# Patient Record
Sex: FEMALE | Race: WHITE | Employment: OTHER | URBAN - METROPOLITAN AREA
[De-identification: names, ages, dates, MRNs, and addresses within clinical notes are randomized per-mention and may not be internally consistent; named-entity substitution may affect disease eponyms.]

---

## 2017-01-13 ENCOUNTER — HOSPITAL ENCOUNTER (OUTPATIENT)
Age: 82
Discharge: HOME | End: 2017-01-13
Attending: EMERGENCY MEDICINE
Payer: MEDICARE

## 2017-01-13 VITALS
OXYGEN SATURATION: 98 % | HEART RATE: 93 BPM | DIASTOLIC BLOOD PRESSURE: 84 MMHG | WEIGHT: 125 LBS | RESPIRATION RATE: 18 BRPM | TEMPERATURE: 97 F | HEIGHT: 62 IN | BODY MASS INDEX: 23 KG/M2 | SYSTOLIC BLOOD PRESSURE: 177 MMHG

## 2017-01-13 DIAGNOSIS — J40 BRONCHITIS: Primary | ICD-10-CM

## 2017-01-13 PROCEDURE — 99213 OFFICE O/P EST LOW 20 MIN: CPT

## 2017-01-13 PROCEDURE — 99214 OFFICE O/P EST MOD 30 MIN: CPT

## 2017-01-13 RX ORDER — AZITHROMYCIN 250 MG/1
TABLET, FILM COATED ORAL
Qty: 1 PACKAGE | Refills: 0 | Status: SHIPPED | OUTPATIENT
Start: 2017-01-13 | End: 2017-04-19 | Stop reason: CLARIF

## 2017-01-13 NOTE — ED PROVIDER NOTES
Patient presents with:  Cough/URI      HPI:     Macy Calloway is a 80year old female who presents for evaluation of a chief complaint of cough and hoarse voice. Onset of symptoms was over 2 weeks ago.  Care prior to arrival consisted of: Over-the-counte visit (from the past 10 hour(s)). Diagnosis:    ICD-10-CM    1. Bronchitis J40        All results reviewed and discussed with patient. See AVS for detailed discharge instructions for your condition today. Follow Up with:   Brenda Trammell

## 2017-02-28 ENCOUNTER — LAB ENCOUNTER (OUTPATIENT)
Dept: LAB | Age: 82
End: 2017-02-28
Attending: INTERNAL MEDICINE
Payer: MEDICARE

## 2017-02-28 ENCOUNTER — HOSPITAL ENCOUNTER (OUTPATIENT)
Dept: MAMMOGRAPHY | Age: 82
Discharge: HOME | End: 2017-02-28
Attending: INTERNAL MEDICINE
Payer: MEDICARE

## 2017-02-28 DIAGNOSIS — Z12.31 ENCOUNTER FOR MAMMOGRAM TO ESTABLISH BASELINE MAMMOGRAM: ICD-10-CM

## 2017-02-28 DIAGNOSIS — Z12.31 ENCOUNTER FOR SCREENING MAMMOGRAM FOR MALIGNANT NEOPLASM OF BREAST: ICD-10-CM

## 2017-02-28 DIAGNOSIS — R53.83 FATIGUE: ICD-10-CM

## 2017-02-28 DIAGNOSIS — I10 ESSENTIAL HYPERTENSION, MALIGNANT: ICD-10-CM

## 2017-02-28 DIAGNOSIS — E55.0 RICKETS, ACTIVE: Primary | ICD-10-CM

## 2017-02-28 LAB
ALBUMIN SERPL BCP-MCNC: 4.1 G/DL (ref 3.5–4.8)
ALBUMIN SERPL BCP-MCNC: 4.1 G/DL (ref 3.5–4.8)
ALP SERPL-CCNC: 81 U/L (ref 32–100)
ALT SERPL-CCNC: 18 U/L (ref 14–54)
ANION GAP SERPL CALC-SCNC: 11 MMOL/L (ref 0–18)
AST SERPL-CCNC: 31 U/L (ref 15–41)
BASOPHILS # BLD: 0 K/UL (ref 0–0.2)
BASOPHILS NFR BLD: 1 %
BILIRUB DIRECT SERPL-MCNC: 0.2 MG/DL (ref 0–0.2)
BILIRUB SERPL-MCNC: 0.8 MG/DL (ref 0.3–1.2)
BUN SERPL-MCNC: 14 MG/DL (ref 8–20)
BUN/CREAT SERPL: 12.3 (ref 10–20)
CALCIUM SERPL-MCNC: 9.6 MG/DL (ref 8.5–10.5)
CHLORIDE SERPL-SCNC: 98 MMOL/L (ref 95–110)
CHOLEST SERPL-MCNC: 255 MG/DL (ref 110–200)
CO2 SERPL-SCNC: 28 MMOL/L (ref 22–32)
CREAT SERPL-MCNC: 1.14 MG/DL (ref 0.5–1.5)
EOSINOPHIL # BLD: 0.2 K/UL (ref 0–0.7)
EOSINOPHIL NFR BLD: 4 %
ERYTHROCYTE [DISTWIDTH] IN BLOOD BY AUTOMATED COUNT: 14.3 % (ref 11–15)
GLUCOSE SERPL-MCNC: 83 MG/DL (ref 70–99)
HCT VFR BLD AUTO: 38.7 % (ref 35–48)
HDLC SERPL-MCNC: 98 MG/DL
HGB BLD-MCNC: 12.8 G/DL (ref 12–16)
LDLC SERPL CALC-MCNC: 142 MG/DL (ref 0–99)
LYMPHOCYTES # BLD: 1.3 K/UL (ref 1–4)
LYMPHOCYTES NFR BLD: 31 %
MCH RBC QN AUTO: 29.6 PG (ref 27–32)
MCHC RBC AUTO-ENTMCNC: 33.2 G/DL (ref 32–37)
MCV RBC AUTO: 89.4 FL (ref 80–100)
MONOCYTES # BLD: 0.5 K/UL (ref 0–1)
MONOCYTES NFR BLD: 13 %
NEUTROPHILS # BLD AUTO: 2.2 K/UL (ref 1.8–7.7)
NEUTROPHILS NFR BLD: 52 %
NONHDLC SERPL-MCNC: 157 MG/DL
OSMOLALITY UR CALC.SUM OF ELEC: 284 MOSM/KG (ref 275–295)
PHOSPHATE SERPL-MCNC: 3.4 MG/DL (ref 2.4–4.7)
PLATELET # BLD AUTO: 241 K/UL (ref 140–400)
PMV BLD AUTO: 8.8 FL (ref 7.4–10.3)
POTASSIUM SERPL-SCNC: 3.5 MMOL/L (ref 3.3–5.1)
PROT SERPL-MCNC: 7.3 G/DL (ref 5.9–8.4)
RBC # BLD AUTO: 4.33 M/UL (ref 3.7–5.4)
SODIUM SERPL-SCNC: 137 MMOL/L (ref 136–144)
TRIGL SERPL-MCNC: 74 MG/DL (ref 1–149)
TSH SERPL-ACNC: 3.86 UIU/ML (ref 0.34–5.6)
WBC # BLD AUTO: 4.2 K/UL (ref 4–11)

## 2017-02-28 PROCEDURE — 77067 SCR MAMMO BI INCL CAD: CPT

## 2017-02-28 PROCEDURE — 84443 ASSAY THYROID STIM HORMONE: CPT

## 2017-02-28 PROCEDURE — 80076 HEPATIC FUNCTION PANEL: CPT

## 2017-02-28 PROCEDURE — 36415 COLL VENOUS BLD VENIPUNCTURE: CPT

## 2017-02-28 PROCEDURE — 80061 LIPID PANEL: CPT

## 2017-02-28 PROCEDURE — 80069 RENAL FUNCTION PANEL: CPT

## 2017-02-28 PROCEDURE — 85025 COMPLETE CBC W/AUTO DIFF WBC: CPT

## 2017-02-28 PROCEDURE — 82306 VITAMIN D 25 HYDROXY: CPT

## 2017-03-01 LAB — 25(OH)D3 SERPL-MCNC: 41.3 NG/ML

## 2017-03-23 PROBLEM — F41.9 ANXIETY: Status: ACTIVE | Noted: 2017-03-23

## 2017-04-19 ENCOUNTER — LAB ENCOUNTER (OUTPATIENT)
Dept: LAB | Age: 82
End: 2017-04-19
Attending: INTERNAL MEDICINE
Payer: MEDICARE

## 2017-04-19 ENCOUNTER — HOSPITAL ENCOUNTER (OUTPATIENT)
Dept: GENERAL RADIOLOGY | Age: 82
Discharge: HOME | End: 2017-04-19
Attending: INTERNAL MEDICINE
Payer: MEDICARE

## 2017-04-19 DIAGNOSIS — R05.9 COUGH: ICD-10-CM

## 2017-04-19 DIAGNOSIS — R11.14 BILIOUS VOMITING WITH NAUSEA: ICD-10-CM

## 2017-04-19 DIAGNOSIS — R10.13 EPIGASTRIC PAIN: ICD-10-CM

## 2017-04-19 PROCEDURE — 85025 COMPLETE CBC W/AUTO DIFF WBC: CPT

## 2017-04-19 PROCEDURE — 80053 COMPREHEN METABOLIC PANEL: CPT

## 2017-04-19 PROCEDURE — 36415 COLL VENOUS BLD VENIPUNCTURE: CPT

## 2017-04-19 PROCEDURE — 83690 ASSAY OF LIPASE: CPT

## 2017-04-19 PROCEDURE — 71020 XR CHEST PA + LAT CHEST (CPT=71020): CPT

## 2017-04-21 NOTE — PROGRESS NOTES
Discussed findings with patient. Discussed slightly low potassium level. Recommend increase fluid intake and foods including fruits. Patient will be eating more bananas. She is not coughing any further, therefore will not prescribe antibiotics.   Discus

## 2017-09-26 ENCOUNTER — APPOINTMENT (OUTPATIENT)
Dept: GENERAL RADIOLOGY | Facility: HOSPITAL | Age: 82
End: 2017-09-26
Attending: EMERGENCY MEDICINE
Payer: MEDICARE

## 2017-09-26 ENCOUNTER — HOSPITAL ENCOUNTER (OUTPATIENT)
Age: 82
Discharge: ANOTHER HOSPITAL | End: 2017-09-26
Attending: EMERGENCY MEDICINE
Payer: MEDICARE

## 2017-09-26 ENCOUNTER — HOSPITAL ENCOUNTER (OUTPATIENT)
Facility: HOSPITAL | Age: 82
Setting detail: OBSERVATION
LOS: 1 days | Discharge: HOME | End: 2017-09-28
Attending: EMERGENCY MEDICINE | Admitting: INTERNAL MEDICINE
Payer: MEDICARE

## 2017-09-26 DIAGNOSIS — I35.0 AORTIC VALVE STENOSIS, ETIOLOGY OF CARDIAC VALVE DISEASE UNSPECIFIED: ICD-10-CM

## 2017-09-26 DIAGNOSIS — R07.9 EXERTIONAL CHEST PAIN: Primary | ICD-10-CM

## 2017-09-26 DIAGNOSIS — R07.9 ACUTE CHEST PAIN: Primary | ICD-10-CM

## 2017-09-26 PROCEDURE — 99285 EMERGENCY DEPT VISIT HI MDM: CPT

## 2017-09-26 PROCEDURE — 85025 COMPLETE CBC W/AUTO DIFF WBC: CPT | Performed by: EMERGENCY MEDICINE

## 2017-09-26 PROCEDURE — 93010 ELECTROCARDIOGRAM REPORT: CPT | Performed by: EMERGENCY MEDICINE

## 2017-09-26 PROCEDURE — 84484 ASSAY OF TROPONIN QUANT: CPT | Performed by: EMERGENCY MEDICINE

## 2017-09-26 PROCEDURE — 93005 ELECTROCARDIOGRAM TRACING: CPT

## 2017-09-26 PROCEDURE — 93010 ELECTROCARDIOGRAM REPORT: CPT

## 2017-09-26 PROCEDURE — 36000 PLACE NEEDLE IN VEIN: CPT

## 2017-09-26 PROCEDURE — 36415 COLL VENOUS BLD VENIPUNCTURE: CPT

## 2017-09-26 PROCEDURE — 99214 OFFICE O/P EST MOD 30 MIN: CPT

## 2017-09-26 PROCEDURE — 85379 FIBRIN DEGRADATION QUANT: CPT | Performed by: EMERGENCY MEDICINE

## 2017-09-26 PROCEDURE — 71020 XR CHEST PA + LAT CHEST (CPT=71020): CPT | Performed by: EMERGENCY MEDICINE

## 2017-09-26 PROCEDURE — 80048 BASIC METABOLIC PNL TOTAL CA: CPT | Performed by: EMERGENCY MEDICINE

## 2017-09-26 RX ORDER — TRIAMTERENE AND HYDROCHLOROTHIAZIDE 37.5; 25 MG/1; MG/1
1 CAPSULE ORAL DAILY
Status: DISCONTINUED | OUTPATIENT
Start: 2017-09-27 | End: 2017-09-28

## 2017-09-26 RX ORDER — SERTRALINE HYDROCHLORIDE 100 MG/1
100 TABLET, FILM COATED ORAL DAILY
Status: DISCONTINUED | OUTPATIENT
Start: 2017-09-27 | End: 2017-09-27

## 2017-09-26 RX ORDER — ASPIRIN 81 MG/1
324 TABLET, CHEWABLE ORAL ONCE
Status: COMPLETED | OUTPATIENT
Start: 2017-09-26 | End: 2017-09-26

## 2017-09-26 RX ORDER — ACETAMINOPHEN 325 MG/1
650 TABLET ORAL EVERY 6 HOURS PRN
Status: DISCONTINUED | OUTPATIENT
Start: 2017-09-26 | End: 2017-09-28

## 2017-09-26 RX ORDER — ASPIRIN 81 MG/1
81 TABLET ORAL DAILY
Status: DISCONTINUED | OUTPATIENT
Start: 2017-09-27 | End: 2017-09-28

## 2017-09-26 RX ORDER — PANTOPRAZOLE SODIUM 40 MG/1
40 TABLET, DELAYED RELEASE ORAL
Status: DISCONTINUED | OUTPATIENT
Start: 2017-09-27 | End: 2017-09-28

## 2017-09-26 RX ORDER — OMEGA-3-ACID ETHYL ESTERS 1 G/1
1 CAPSULE, LIQUID FILLED ORAL DAILY
Status: ON HOLD | COMMUNITY
End: 2017-09-26 | Stop reason: CLARIF

## 2017-09-26 RX ORDER — NAPROXEN 375 MG/1
375 TABLET ORAL 2 TIMES DAILY PRN
Status: DISCONTINUED | OUTPATIENT
Start: 2017-09-26 | End: 2017-09-28

## 2017-09-26 RX ORDER — OYSTER SHELL CALCIUM WITH VITAMIN D 500; 200 MG/1; [IU]/1
1 TABLET, FILM COATED ORAL DAILY
Status: DISCONTINUED | OUTPATIENT
Start: 2017-09-27 | End: 2017-09-28

## 2017-09-26 RX ORDER — MULTIVITAMIN WITH IRON
1 TABLET ORAL DAILY
Status: DISCONTINUED | OUTPATIENT
Start: 2017-09-27 | End: 2017-09-28

## 2017-09-26 RX ORDER — OMEGA-3 FATTY ACIDS/FISH OIL 300-1000MG
1 CAPSULE ORAL
COMMUNITY

## 2017-09-26 RX ORDER — 0.9 % SODIUM CHLORIDE 0.9 %
1000 INTRAVENOUS SOLUTION INTRAVENOUS ONCE
Status: COMPLETED | OUTPATIENT
Start: 2017-09-26 | End: 2017-09-26

## 2017-09-26 NOTE — ED PROVIDER NOTES
[facility]    History   Patient presents with:  Chest Pain Angina (cardiovascular)    Stated Complaint: chest pain, dizzy    HPI    Patient is an 51-year-old female with a history of aortic stenosis and h complaint: chest pain, dizzy  Other systems are as noted in HPI. Constitutional and vital signs reviewed. All other systems reviewed and negative except as noted above.     PSFH elements reviewed from today and agreed except as otherwise stated in HPI

## 2017-09-26 NOTE — ED NOTES
Seen and examined by dr Teri Ordonez , pt to go to ed for further eval and management, 911 called, report called by kolby cagle, tl

## 2017-09-26 NOTE — ED PROVIDER NOTES
Patient Seen in: [facility] Emergency Department    History   Patient presents with:  Chest Pain Angina (cardiovascular)    Stated Complaint:  Chest pain    HPI    79 yo F with PMH HTN, moderate aortic stenosis (as per 9/2016 TTE) presenting for jcarlos daily   CALCIUM + D OR,  one tab daily       Family History   Problem Relation Age of Onset   • Cancer Mother      Breast Cancer 71   • Breast Cancer Mother 64   • Heart Disease Mother      aortic stenosis   • Cancer Maternal Grandmother      breast   • Br GFR, -American 56 (*)     All other components within normal limits   CBC W/ DIFFERENTIAL - Abnormal; Notable for the following:     HGB 10.7 (*)     HCT 31.8 (*)     RDW 15.1 (*)     All other components within normal limits   TROPONIN I, 0 HOUR CARDIAC/VASC: The cardiomediastinal silhouette is not enlarged. There is mild tortuosity of the thoracic aorta with peripheral atherosclerotic vascular calcification. The pulmonary vascularity is within normal limits.  MEDIAST/LAWRENCE: No visible mass or adeno with recs appreciated. ED course nonacute.       Disposition and Plan     Clinical Impression:  Exertional chest pain  (primary encounter diagnosis)  Aortic valve stenosis, etiology of cardiac valve disease unspecified    Disposition:  Admit    Follow-up:

## 2017-09-27 ENCOUNTER — APPOINTMENT (OUTPATIENT)
Dept: CV DIAGNOSTICS | Facility: HOSPITAL | Age: 82
End: 2017-09-27
Attending: INTERNAL MEDICINE
Payer: MEDICARE

## 2017-09-27 PROBLEM — N18.30 CHRONIC RENAL INSUFFICIENCY, STAGE 3 (MODERATE) (HCC): Status: ACTIVE | Noted: 2017-09-27

## 2017-09-27 PROBLEM — D50.8 IRON DEFICIENCY ANEMIA SECONDARY TO INADEQUATE DIETARY IRON INTAKE: Status: ACTIVE | Noted: 2017-09-27

## 2017-09-27 PROBLEM — F32.9 REACTIVE DEPRESSION: Status: ACTIVE | Noted: 2017-09-27

## 2017-09-27 PROCEDURE — 85025 COMPLETE CBC W/AUTO DIFF WBC: CPT | Performed by: INTERNAL MEDICINE

## 2017-09-27 PROCEDURE — 80076 HEPATIC FUNCTION PANEL: CPT | Performed by: INTERNAL MEDICINE

## 2017-09-27 PROCEDURE — 80061 LIPID PANEL: CPT | Performed by: INTERNAL MEDICINE

## 2017-09-27 PROCEDURE — 82728 ASSAY OF FERRITIN: CPT | Performed by: INTERNAL MEDICINE

## 2017-09-27 PROCEDURE — 80069 RENAL FUNCTION PANEL: CPT | Performed by: INTERNAL MEDICINE

## 2017-09-27 PROCEDURE — 82607 VITAMIN B-12: CPT | Performed by: INTERNAL MEDICINE

## 2017-09-27 PROCEDURE — 93306 TTE W/DOPPLER COMPLETE: CPT | Performed by: INTERNAL MEDICINE

## 2017-09-27 PROCEDURE — 84466 ASSAY OF TRANSFERRIN: CPT | Performed by: INTERNAL MEDICINE

## 2017-09-27 PROCEDURE — 83540 ASSAY OF IRON: CPT | Performed by: INTERNAL MEDICINE

## 2017-09-27 PROCEDURE — 85045 AUTOMATED RETICULOCYTE COUNT: CPT | Performed by: INTERNAL MEDICINE

## 2017-09-27 PROCEDURE — 84484 ASSAY OF TROPONIN QUANT: CPT | Performed by: INTERNAL MEDICINE

## 2017-09-27 PROCEDURE — 85652 RBC SED RATE AUTOMATED: CPT | Performed by: INTERNAL MEDICINE

## 2017-09-27 NOTE — PLAN OF CARE
PAIN - ADULT    • Verbalizes/displays adequate comfort level or patient's stated pain goal Progressing        SAFETY ADULT - FALL    • Free from fall injury Progressing        Patient admitted with exertional chest pain. Currently not experiencing any s/s.

## 2017-09-27 NOTE — CM/SW NOTE
CTL update from RN regarding progression of care. Status: OBS. Discharge is pending 2 D Echo results. If within normal limits, patient will have a Stress Test.  If Echo is positive, patient may have cardiac cath.       SW & CTL will continue to follow p

## 2017-09-27 NOTE — H&P
[address] [facility] Patient Status:  Inpatient    1930 MRN [id number]   Location [facility] 3W/SW Attending Brent Majano MD   Hosp Day # 1 PCP Carlie Egan MD     Date:  2017  Da complications which have arisen and more stress related to the above.     History     Past Medical History:   Diagnosis Date   • Aortic stenosis    • Arthritis    • Bronchitis    • Concussion    • Constipation    • Depression    • HIGH BLOOD PRESSURE    • I one tab daily   VITAMIN E one tab daily   VITAMIN-B COMPLEX OR one tab daily       Review of Systems:   Constitutional: Normally feels well with reasonably good energy  Respiratory: No fever chills cough shortness of breath or active wheezing  Cardiovascul (L) 09/27/2017   ALKPHO 60 09/27/2017   BILT 0.7 09/27/2017   TP 5.8 (L) 09/27/2017   AST 27 09/27/2017   ALT 15 09/27/2017   TSH 3.86 02/28/2017   LIP 41 04/19/2017   DDIMER 0.50 09/26/2017   ESRML 22 09/27/2017   PHOS 4.5 09/27/2017   TROP 0.01 09/27/201

## 2017-09-27 NOTE — CONSULTS
[facility]    PATIENT'S NAME: Kelli Borjas   ATTENDING PHYSICIAN: Marly Grant. Paresh Ross MD   CONSULTING PHYSICIAN: Osorio Yang.  Libra Lobo MD   PATIENT ACCOUNT#:   [id number]    LOCATION:  3WSW [address] [facility] #:   [id number]       DATE OF BIR 156/82, pulse is 68 and regular. HEENT:  Head is normocephalic. Eyes are nonicteric. Oral mucosa is moist.   NECK:  No JVD or carotid bruits. Thyroid is small. LUNGS:  No rales or wheezes. HEART:  Regular rate and rhythm.   Systolic ejection murm

## 2017-09-27 NOTE — PLAN OF CARE
Plan for 2D echo today. Patient is on schedule for testing but according to staff plan for echo to be done later in afternoon.      1600 -  Voicemail message left to determine if testing to be completed today or tomorrow

## 2017-09-28 ENCOUNTER — APPOINTMENT (OUTPATIENT)
Dept: NUCLEAR MEDICINE | Facility: HOSPITAL | Age: 82
End: 2017-09-28
Attending: INTERNAL MEDICINE
Payer: MEDICARE

## 2017-09-28 ENCOUNTER — APPOINTMENT (OUTPATIENT)
Dept: CV DIAGNOSTICS | Facility: HOSPITAL | Age: 82
End: 2017-09-28
Attending: INTERNAL MEDICINE
Payer: MEDICARE

## 2017-09-28 VITALS
RESPIRATION RATE: 16 BRPM | SYSTOLIC BLOOD PRESSURE: 141 MMHG | HEART RATE: 81 BPM | DIASTOLIC BLOOD PRESSURE: 76 MMHG | BODY MASS INDEX: 21.62 KG/M2 | OXYGEN SATURATION: 96 % | HEIGHT: 62.01 IN | TEMPERATURE: 98 F | WEIGHT: 119 LBS

## 2017-09-28 PROCEDURE — 80069 RENAL FUNCTION PANEL: CPT | Performed by: INTERNAL MEDICINE

## 2017-09-28 PROCEDURE — 93017 CV STRESS TEST TRACING ONLY: CPT | Performed by: INTERNAL MEDICINE

## 2017-09-28 PROCEDURE — 93018 CV STRESS TEST I&R ONLY: CPT | Performed by: INTERNAL MEDICINE

## 2017-09-28 PROCEDURE — 78452 HT MUSCLE IMAGE SPECT MULT: CPT | Performed by: INTERNAL MEDICINE

## 2017-09-28 PROCEDURE — 85025 COMPLETE CBC W/AUTO DIFF WBC: CPT | Performed by: INTERNAL MEDICINE

## 2017-09-28 PROCEDURE — 93016 CV STRESS TEST SUPVJ ONLY: CPT | Performed by: INTERNAL MEDICINE

## 2017-09-28 RX ORDER — 0.9 % SODIUM CHLORIDE 0.9 %
VIAL (ML) INJECTION
Status: COMPLETED
Start: 2017-09-28 | End: 2017-09-28

## 2017-09-28 NOTE — PROGRESS NOTES
[facility]    Progress Note    Farhat Bergman Patient Status:  Observation    1930 MRN [id number]   Location [facility] 3W/SW Attending Jesus Mays MD   Hosp Day # 1 PCP Myrna Hoff MD       Subjective:     Charisse Kirkpatrick intrathoracic process. 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  ABNORMAL When compared with ECG of 09/26/2017 16:59:16 No significant changes have occurred El

## 2017-09-28 NOTE — PLAN OF CARE
Problem: Patient/Family Goals  Goal: Patient/Family Long Term Goal  Patient's Long Term Goal: to be discharged home    Interventions:  - Patient sitting up in chair for meals and as tolerated.   - See additional Care Plan goals for specific interventions Identify cognitive and physical deficits and behaviors that affect risk of falls.   - Kinzers fall precautions as indicated by assessment.  - Educate pt/family on patient safety including physical limitations  - Instruct pt to call for assistance with act

## 2017-09-28 NOTE — PROGRESS NOTES
[facility]  Progress Note    Theodora Valentino Patient Status:  Observation    1930 MRN [id number]   Location [facility] 3W/SW Attending Vipin Stevenson MD   Hosp Day # 1 PCP Otoniel Ocampo MD       ASSESSMENT:    1 present  Neurologic: Alert and oriented, normal affect. Skin: Warm and dry.    Psych: Appropriate      Laboratory/Data:  Diagnostics:     Echo: Pending     TELE: NSR    Labs:    Lab Results  Component Value Date   WBC 3.8 09/28/2017   HGB 10.1 09/28/2017

## 2017-10-19 PROBLEM — R07.9 EXERTIONAL CHEST PAIN: Status: RESOLVED | Noted: 2017-09-26 | Resolved: 2017-10-19

## 2017-10-29 ENCOUNTER — HOSPITAL ENCOUNTER (OUTPATIENT)
Age: 82
Discharge: HOME | End: 2017-10-29
Attending: PEDIATRICS
Payer: MEDICARE

## 2017-10-29 VITALS
DIASTOLIC BLOOD PRESSURE: 70 MMHG | SYSTOLIC BLOOD PRESSURE: 112 MMHG | WEIGHT: 120 LBS | HEART RATE: 99 BPM | RESPIRATION RATE: 18 BRPM | HEIGHT: 62.5 IN | BODY MASS INDEX: 21.53 KG/M2 | TEMPERATURE: 97 F | OXYGEN SATURATION: 99 %

## 2017-10-29 DIAGNOSIS — G89.29 CHRONIC LEFT SHOULDER PAIN: Primary | ICD-10-CM

## 2017-10-29 DIAGNOSIS — M25.512 CHRONIC LEFT SHOULDER PAIN: Primary | ICD-10-CM

## 2017-10-29 PROCEDURE — 99214 OFFICE O/P EST MOD 30 MIN: CPT

## 2017-10-29 PROCEDURE — 99213 OFFICE O/P EST LOW 20 MIN: CPT

## 2017-10-29 RX ORDER — TRAMADOL HYDROCHLORIDE 50 MG/1
TABLET ORAL EVERY 4 HOURS PRN
Qty: 20 TABLET | Refills: 0 | Status: SHIPPED | OUTPATIENT
Start: 2017-10-29 | End: 2017-11-05

## 2017-10-29 NOTE — ED INITIAL ASSESSMENT (HPI)
PATIENT ARRIVED AMBULATORY TO ROOM C/O LEFT SHOULDER PAIN AND LEFT ELBOW PAIN.  PATIENT STATES \"I'M ALSO HAVING PAIN ON THE RIGHT ARM TOO\" PATIENT STATES \"MY DOCTOR REFERRED ME TO AN ORTHO BUT I CAN'T GET IN WITH HIM UNTIL 11/07 AND THE PAIN IS GETTING W

## 2017-10-29 NOTE — ED PROVIDER NOTES
Patient Seen in: [address]    History   Patient presents with:  Shoulder Pain    Stated Complaint: bursitis problems    HPI    HPI: Gudelia Plasencia is a 80year old female who presents with bilateral shoulder pain for 3 daily.   VITAMIN D OR,  one tab daily   VITAMIN E,  one tab daily   VITAMIN-B COMPLEX OR,  one tab daily       Family History   Problem Relation Age of Onset   • Cancer Mother      Breast Cancer 71   • Breast Cancer Mother 64   • Heart Disease Mother rotator cuff problems. I advised to keep her appointment with the orthopedic surgeon. Trial tramadol for pain. Side effects were discussed in detail.     Disposition:  Discharge    Follow-up:  Riccadro Phillip MD  [facility]

## 2017-10-30 ENCOUNTER — LAB ENCOUNTER (OUTPATIENT)
Dept: LAB | Age: 82
End: 2017-10-30
Attending: INTERNAL MEDICINE
Payer: MEDICARE

## 2017-10-30 DIAGNOSIS — D50.8 IRON DEFICIENCY ANEMIA SECONDARY TO INADEQUATE DIETARY IRON INTAKE: ICD-10-CM

## 2017-10-30 PROCEDURE — 85025 COMPLETE CBC W/AUTO DIFF WBC: CPT

## 2017-10-30 PROCEDURE — 36415 COLL VENOUS BLD VENIPUNCTURE: CPT

## 2017-11-27 ENCOUNTER — CHARTING TRANS (OUTPATIENT)
Dept: OTHER | Age: 82
End: 2017-11-27

## 2018-01-13 NOTE — DISCHARGE SUMMARY
[facility]    Discharge Summary    Narendra Grewal Patient Status:  Observation    1930 MRN [id number]   Location [facility] 3W/SW Attending No att. providers found   Hosp Day # 1 PCP Elizabeth Edge MD     Date of Admission Historical    Omega 3 1000 MG Oral Cap  Take 1 capsule by mouth., Historical    Triamterene-HCTZ (DYAZIDE) 37.5-25 MG Oral Cap  one tab daily, Normal, Disp-90 capsule, R-1    Omeprazole 40 MG Oral Capsule Delayed Release  Take 1 capsule (40 mg total) by mo

## 2018-01-31 ENCOUNTER — HOSPITAL ENCOUNTER (OUTPATIENT)
Dept: GENERAL RADIOLOGY | Age: 83
Discharge: HOME | End: 2018-01-31
Attending: ORTHOPAEDIC SURGERY
Payer: MEDICARE

## 2018-01-31 DIAGNOSIS — M25.519 SHOULDER PAIN: ICD-10-CM

## 2018-01-31 PROCEDURE — 73030 X-RAY EXAM OF SHOULDER: CPT | Performed by: ORTHOPAEDIC SURGERY

## 2018-02-01 PROBLEM — I35.0 MODERATE AORTIC STENOSIS: Status: ACTIVE | Noted: 2017-09-26

## 2018-03-05 ENCOUNTER — OFFICE VISIT (OUTPATIENT)
Dept: PHYSICAL THERAPY | Age: 83
End: 2018-03-05
Attending: ORTHOPAEDIC SURGERY
Payer: MEDICARE

## 2018-03-05 PROCEDURE — 97163 PT EVAL HIGH COMPLEX 45 MIN: CPT

## 2018-03-05 NOTE — PROGRESS NOTES
UPPER EXTREMITY EVALUATION:   Referring Physician: Dr. Gisela Driver  Diagnosis: B shoulder pain  Date of Onset: >9months ago Date of Service: 3/5/2018     PATIENT SUMMARY   Ginny Lynch is a 80year old y/o female who presents to therapy today with complain female who presents to PT with c/o L shoulder pain that began of insidious onset 9 months ago and has progressed since. Postural deviations noted with moderate fwd head, rounded shoulders, increased thoracic kyphosis abducted scapulae.  Moderate postural co decreased into abduction  AC: decreased IR and ER motion L>R  GH: decreased into abd on R, dec into abd and flex on L    Special tests:   Neers: limited by joint restriction  Toña-Jesus Manuel: (+) on L  Speeds: (+) on R, pain-limiting on L    Palpation: TTP treatment options and has agreed to actively participate in planning and for this course of care. Thank you for your referral. Please co-sign or sign and return this letter via fax as soon as possible to [phone].  If you have any questions, please c

## 2018-03-08 ENCOUNTER — TELEPHONE (OUTPATIENT)
Dept: PHYSICAL THERAPY | Age: 83
End: 2018-03-08

## 2018-03-08 ENCOUNTER — APPOINTMENT (OUTPATIENT)
Dept: PHYSICAL THERAPY | Age: 83
End: 2018-03-08
Attending: ORTHOPAEDIC SURGERY
Payer: MEDICARE

## 2018-03-15 ENCOUNTER — TELEPHONE (OUTPATIENT)
Dept: PHYSICAL THERAPY | Age: 83
End: 2018-03-15

## 2018-03-16 ENCOUNTER — OFFICE VISIT (OUTPATIENT)
Dept: PHYSICAL THERAPY | Age: 83
End: 2018-03-16
Attending: ORTHOPAEDIC SURGERY
Payer: MEDICARE

## 2018-03-16 PROCEDURE — 97112 NEUROMUSCULAR REEDUCATION: CPT

## 2018-03-16 PROCEDURE — 97140 MANUAL THERAPY 1/> REGIONS: CPT

## 2018-03-16 PROCEDURE — 97110 THERAPEUTIC EXERCISES: CPT

## 2018-03-16 NOTE — PROGRESS NOTES
B shoulder pain  Authorized # of Visits:  10-12  (per POC)        Next MD visit: none scheduled  Fall Risk: standard         Precautions: n/a           Medication Changes since last visit?: No  Subjective: Patient states her shoulders have been doing bett Treatment: 45 min  Total Treatment Time: 45 min

## 2018-03-19 ENCOUNTER — OFFICE VISIT (OUTPATIENT)
Dept: PHYSICAL THERAPY | Age: 83
End: 2018-03-19
Attending: ORTHOPAEDIC SURGERY
Payer: MEDICARE

## 2018-03-19 ENCOUNTER — TELEPHONE (OUTPATIENT)
Dept: PHYSICAL THERAPY | Age: 83
End: 2018-03-19

## 2018-03-19 PROCEDURE — 97112 NEUROMUSCULAR REEDUCATION: CPT

## 2018-03-19 PROCEDURE — 97110 THERAPEUTIC EXERCISES: CPT

## 2018-03-19 NOTE — PROGRESS NOTES
B shoulder pain  Authorized # of Visits:  10-12  (per POC)        Next MD visit: none scheduled  Fall Risk: standard         Precautions: n/a           Medication Changes since last visit?: No  Subjective: Patient states was reaching up and felt pain in h postural education and progressed to standing posture. Pt requires moderate cueing for postural correction, however able to improve shoulder elevation when in corrected position. Patient Education: Instructed pt on HEP progression indicated above.  Revie

## 2018-03-21 ENCOUNTER — APPOINTMENT (OUTPATIENT)
Dept: PHYSICAL THERAPY | Age: 83
End: 2018-03-21
Attending: ORTHOPAEDIC SURGERY
Payer: MEDICARE

## 2018-03-23 ENCOUNTER — OFFICE VISIT (OUTPATIENT)
Dept: PHYSICAL THERAPY | Age: 83
End: 2018-03-23
Attending: INTERNAL MEDICINE
Payer: MEDICARE

## 2018-03-23 PROCEDURE — 97140 MANUAL THERAPY 1/> REGIONS: CPT

## 2018-03-23 PROCEDURE — 97110 THERAPEUTIC EXERCISES: CPT

## 2018-03-23 NOTE — PROGRESS NOTES
B shoulder pain  Authorized # of Visits:  10-12  (per POC)        Next MD visit: none scheduled  Fall Risk: standard         Precautions: n/a           Medication Changes since last visit?: No  Subjective: Patient states still has a little bit of pain in head/neck and shoulder position    Modalities   MHP to B shoulders x5mins        Assessment: Continued with mobility exercises, decreasing repetitions due to aggravation of symptoms. Initiated UBE for added mobility work. Pt able to perform pain-free.  Darlingig

## 2018-03-26 ENCOUNTER — OFFICE VISIT (OUTPATIENT)
Dept: PHYSICAL THERAPY | Age: 83
End: 2018-03-26
Attending: ORTHOPAEDIC SURGERY
Payer: MEDICARE

## 2018-03-26 PROCEDURE — 97140 MANUAL THERAPY 1/> REGIONS: CPT

## 2018-03-26 PROCEDURE — 97110 THERAPEUTIC EXERCISES: CPT

## 2018-03-26 NOTE — PROGRESS NOTES
B shoulder pain  Authorized # of Visits:  10-12  (per POC)        Next MD visit: none scheduled  Fall Risk: standard         Precautions: n/a           Medication Changes since last visit?: No  Subjective: Patient states her shoulders have been doing bett Grade II/III GHJ mobs  - Gentle STM L anterior shoulder mms Supine:   - Grade II/III GHJ mobs  - Gentle STM L anterior and lateral shoulder mms Supine:  - Grade II GHJ mobs  - STM post RTC musculature focus L   Neuromuscular Re-education Seated postural co back  4. Pt will increase BUE strength to at least 4/5 take bowls out of overhead cabinets          Charges:  TherEx 2, Man x1     Total Timed Treatment: 47 min  Total Treatment Time: 58 min

## 2018-03-29 ENCOUNTER — APPOINTMENT (OUTPATIENT)
Dept: PHYSICAL THERAPY | Age: 83
End: 2018-03-29
Attending: ORTHOPAEDIC SURGERY
Payer: MEDICARE

## 2018-04-02 ENCOUNTER — TELEPHONE (OUTPATIENT)
Dept: PHYSICAL THERAPY | Age: 83
End: 2018-04-02

## 2018-04-03 ENCOUNTER — OFFICE VISIT (OUTPATIENT)
Dept: PHYSICAL THERAPY | Age: 83
End: 2018-04-03
Attending: ORTHOPAEDIC SURGERY
Payer: MEDICARE

## 2018-04-03 ENCOUNTER — APPOINTMENT (OUTPATIENT)
Dept: LAB | Age: 83
End: 2018-04-03
Attending: INTERNAL MEDICINE
Payer: MEDICARE

## 2018-04-03 DIAGNOSIS — D51.8 OTHER VITAMIN B12 DEFICIENCY ANEMIA: ICD-10-CM

## 2018-04-03 PROCEDURE — 97110 THERAPEUTIC EXERCISES: CPT

## 2018-04-03 PROCEDURE — 36415 COLL VENOUS BLD VENIPUNCTURE: CPT

## 2018-04-03 PROCEDURE — 82607 VITAMIN B-12: CPT

## 2018-04-03 NOTE — PROGRESS NOTES
B shoulder pain  Authorized # of Visits:  10-12  (per POC)        Next MD visit: 4/12/18  Fall Risk: standard         Precautions: n/a           Medication Changes since last visit?: No  Subjective: Patient states her shoulders are feeling better.  Is able II/III GHJ mobs  - Gentle STM L anterior shoulder mms Supine:   - Grade II/III GHJ mobs  - Gentle STM L anterior and lateral shoulder mms Supine:  - Grade II GHJ mobs  - STM post RTC musculature focus L   Neuromuscular Re-education  Seated postural correct Pt will increase BUE strength to at least 4/5 take bowls out of overhead cabinets          Charges:  TherEx 3   Total Timed Treatment: 38 min  Total Treatment Time: 46 min

## 2018-04-06 ENCOUNTER — OFFICE VISIT (OUTPATIENT)
Dept: PHYSICAL THERAPY | Age: 83
End: 2018-04-06
Attending: ORTHOPAEDIC SURGERY
Payer: MEDICARE

## 2018-04-06 PROCEDURE — 97110 THERAPEUTIC EXERCISES: CPT

## 2018-04-06 NOTE — PROGRESS NOTES
B shoulder pain  Authorized # of Visits:  10-12  (per POC)        Next MD visit: 4/12/18  Fall Risk: standard         Precautions: n/a           Medication Changes since last visit?: No  Subjective: Patient states the past couple days has been having a lo positioning  - Finger ladder 10x R/L  - Wall slides flexion with towel  - AAROM functional IR with towel 10x   Manual Supine:  - Grade II GHJ mobs B Supine:   - Grade II/III GHJ mobs  - Gentle STM L anterior shoulder mms Supine:   - Grade II/III GHJ mobs cueing <25% of the time. 3. Pt will demo improved shoulder AROM to [facility] TIM IBARRA with pain at <2/10 to be able to reach across her body to put deodorant and hook her bra behind her back  4.  Pt will increase BUE strength to at least 4/5 take bowls out of overhead

## 2018-04-09 ENCOUNTER — OFFICE VISIT (OUTPATIENT)
Dept: PHYSICAL THERAPY | Age: 83
End: 2018-04-09
Attending: ORTHOPAEDIC SURGERY
Payer: MEDICARE

## 2018-04-09 PROCEDURE — 97110 THERAPEUTIC EXERCISES: CPT

## 2018-04-09 NOTE — PROGRESS NOTES
Patient Name: Narendra Grewal, : 1930, MRN: [id number]   Date:  2018  Referring Physician:  Ashley Neri    Diagnosis:  B shoulder pain    Progress Summary    Pt has attended 8 visits in Physical Therapy.      Progress Note Start Date: 3/5/18 min restrict - some pain into L shoulder  RSB: mod-sig restrict - some pain into L shoulder  LSB: mod restrict  RRot: WNL  LRot: WNL     Thoracic Screen: increased thoracic kyphosis     AROM:  Shoulder  Elbow   Flexion: R 106; L 89  Extension: R 42; L 36 Progress/Goal status, Carrying, Moving and Handling Objects, CK: 40-59% impaired, limited, or restricted  Goal status G Code: Carrying, Moving and Handling ObjectsCJ: 20-39% impaired, limited, or restricted    Rehab Potential: good for right shoulder (Fair

## 2018-04-12 ENCOUNTER — HOSPITAL ENCOUNTER (OUTPATIENT)
Age: 83
Discharge: HOME | End: 2018-04-12
Attending: FAMILY MEDICINE
Payer: MEDICARE

## 2018-04-12 ENCOUNTER — OFFICE VISIT (OUTPATIENT)
Dept: PHYSICAL THERAPY | Age: 83
End: 2018-04-12
Attending: ORTHOPAEDIC SURGERY
Payer: MEDICARE

## 2018-04-12 VITALS
HEART RATE: 98 BPM | TEMPERATURE: 98 F | SYSTOLIC BLOOD PRESSURE: 123 MMHG | RESPIRATION RATE: 16 BRPM | OXYGEN SATURATION: 98 % | DIASTOLIC BLOOD PRESSURE: 72 MMHG

## 2018-04-12 DIAGNOSIS — R42 VERTIGO: Primary | ICD-10-CM

## 2018-04-12 PROCEDURE — 99213 OFFICE O/P EST LOW 20 MIN: CPT

## 2018-04-12 PROCEDURE — 99214 OFFICE O/P EST MOD 30 MIN: CPT

## 2018-04-12 RX ORDER — MECLIZINE HYDROCHLORIDE 25 MG/1
25 TABLET ORAL 3 TIMES DAILY PRN
Qty: 15 TABLET | Refills: 0 | Status: SHIPPED | OUTPATIENT
Start: 2018-04-12 | End: 2018-04-17

## 2018-04-12 NOTE — PROGRESS NOTES
Patient presents to PT this session with her . States woke up this morning very dizzy and nauseous and when she went to stand up she fell into her dresser. Is still feeling very dizzy and not sure if she should do therapy today.  Her shoulders feels

## 2018-04-12 NOTE — ED INITIAL ASSESSMENT (HPI)
Pt reporting dizziness starting yesterday. States she feels like the room is spinning and feels nauseated. Pt denies headache, visual changes. Dr. Reyes May at bedside.

## 2018-04-12 NOTE — ED PROVIDER NOTES
[facility]    History   Patient presents with:  Dizziness (neurologic)    Stated Complaint: DIZZY    HPI    Here with vertigo symptoms since last night.   Describes sensation of imbalance and spinning sens [04/12/18 1018]  Temp: 98.1 °F (36.7 °C) [04/12/18 1029]  Temp src: Oral [04/12/18 1029]  SpO2: 98 % [04/12/18 1018]  O2 Device: None (Room air) [04/12/18 1018]    Current:/72   Pulse 98   Temp 98.1 °F (36.7 °C) (Oral)   Resp 16   SpO2 98%         Ph as needed for pain. Trial of meclizine as needed vertigo. Avoid sudden changes in head and body position. Avoid driving until vertigo symptoms are completely resolved.   Follow with PCP if symptoms persist.    Disposition:  Discharge  4/12/2018 10:27 am

## 2018-04-13 ENCOUNTER — APPOINTMENT (OUTPATIENT)
Dept: PHYSICAL THERAPY | Age: 83
End: 2018-04-13
Attending: ORTHOPAEDIC SURGERY
Payer: MEDICARE

## 2018-04-16 ENCOUNTER — TELEPHONE (OUTPATIENT)
Dept: PHYSICAL THERAPY | Age: 83
End: 2018-04-16

## 2018-04-16 ENCOUNTER — APPOINTMENT (OUTPATIENT)
Dept: PHYSICAL THERAPY | Age: 83
End: 2018-04-16
Attending: ORTHOPAEDIC SURGERY
Payer: MEDICARE

## 2018-04-19 ENCOUNTER — APPOINTMENT (OUTPATIENT)
Dept: PHYSICAL THERAPY | Age: 83
End: 2018-04-19
Attending: ORTHOPAEDIC SURGERY
Payer: MEDICARE

## 2018-04-26 ENCOUNTER — TELEPHONE (OUTPATIENT)
Dept: PHYSICAL THERAPY | Age: 83
End: 2018-04-26

## 2018-04-26 NOTE — TELEPHONE ENCOUNTER
Patient states her vertigo is still very bad and wondering if she should come in to PT tomorrow.  Advised pt to see how she feels tomorrow morning and call if doesn't feel she can safely make it to PT.

## 2018-04-27 ENCOUNTER — APPOINTMENT (OUTPATIENT)
Dept: PHYSICAL THERAPY | Age: 83
End: 2018-04-27
Attending: ORTHOPAEDIC SURGERY
Payer: MEDICARE

## 2018-04-30 ENCOUNTER — OFFICE VISIT (OUTPATIENT)
Dept: PHYSICAL THERAPY | Age: 83
End: 2018-04-30
Attending: ORTHOPAEDIC SURGERY
Payer: MEDICARE

## 2018-04-30 PROCEDURE — 97110 THERAPEUTIC EXERCISES: CPT

## 2018-04-30 NOTE — PROGRESS NOTES
B shoulder pain  Authorized # of Visits:  10-12  (per POC)        Next MD visit: 4/12/18  Fall Risk: standard         Precautions: n/a           Medication Changes since last visit?: No  Subjective: Pt 8 mins late for today's appt.  Patient states is now a neutral cervical positioning  - Finger ladder 10x R/L  - Wall slides flexion with towel  - AAROM functional IR with towel 10x   Manual Supine:  - Grade II GHJ mobs B Supine:   - Grade II/III GHJ mobs  - Gentle STM L anterior shoulder mms Supine:   - Grade - improved  3. Pt will demo improved shoulder AROM to [facility] TIM IBARRA with pain at <2/10 to be able to reach across her body to put deodorant and hook her bra behind her back - improved on R; minimal change on L  4.  Pt will increase BUE strength to at least 4/5 take

## 2018-05-03 ENCOUNTER — TELEPHONE (OUTPATIENT)
Dept: PHYSICAL THERAPY | Age: 83
End: 2018-05-03

## 2018-05-03 ENCOUNTER — OFFICE VISIT (OUTPATIENT)
Dept: PHYSICAL THERAPY | Age: 83
End: 2018-05-03
Attending: ORTHOPAEDIC SURGERY
Payer: MEDICARE

## 2018-05-03 PROCEDURE — 97110 THERAPEUTIC EXERCISES: CPT

## 2018-05-03 NOTE — PROGRESS NOTES
B shoulder pain  Authorized # of Visits:  12  (per POC) (PN completed 4/9 at visit 8)     Next MD visit: none scheduled  Fall Risk: standard         Precautions: n/a           Medication Changes since last visit?: No  Subjective: Pt states her shoulders a YTB    Standing:  - Wall slides 10x R/L  - Finger ladder 8x L   Manual Supine:  - Grade II GHJ mobs B Supine:   - Grade II/III GHJ mobs  - Gentle STM L anterior shoulder mms Supine:   - Grade II/III GHJ mobs  - Gentle STM L anterior and lateral shoulder mm back - improved on R; minimal change on L  4. Pt will increase BUE strength to at least 4/5 take bowls out of overhead cabinets - improved B, although greater deficits remain on L          Charges:  TherEx 3   Total Timed Treatment: 38 min  Total Treatment

## 2018-05-09 ENCOUNTER — APPOINTMENT (OUTPATIENT)
Dept: PHYSICAL THERAPY | Age: 83
End: 2018-05-09
Attending: INTERNAL MEDICINE
Payer: MEDICARE

## 2018-05-10 ENCOUNTER — OFFICE VISIT (OUTPATIENT)
Dept: PHYSICAL THERAPY | Age: 83
End: 2018-05-10
Attending: INTERNAL MEDICINE
Payer: MEDICARE

## 2018-05-10 PROCEDURE — 97110 THERAPEUTIC EXERCISES: CPT

## 2018-05-10 NOTE — PROGRESS NOTES
B shoulder pain  Authorized # of Visits:  12  (per POC) (PN completed 4/9 at visit 8)     Next MD visit: none scheduled  Fall Risk: standard         Precautions: n/a           Medication Changes since last visit?: No  Subjective: Pt states her shoulders a slides 10x R/L  - Finger ladder 8x L   Manual Supine:  - Grade II GHJ mobs B Supine:   - Grade II/III GHJ mobs  - Gentle STM L anterior shoulder mms Supine:   - Grade II/III GHJ mobs  - Gentle STM L anterior and lateral shoulder mms Supine:  - Grade II GHJ at least 4/5 take bowls out of overhead cabinets - improved B, although greater deficits remain on L          Charges:  TherEx 3   Total Timed Treatment: 40 min  Total Treatment Time: 50 min

## 2018-05-11 ENCOUNTER — APPOINTMENT (OUTPATIENT)
Dept: PHYSICAL THERAPY | Age: 83
End: 2018-05-11
Attending: INTERNAL MEDICINE
Payer: MEDICARE

## 2018-05-14 ENCOUNTER — TELEPHONE (OUTPATIENT)
Dept: PHYSICAL THERAPY | Age: 83
End: 2018-05-14

## 2018-05-14 ENCOUNTER — OFFICE VISIT (OUTPATIENT)
Dept: PHYSICAL THERAPY | Age: 83
End: 2018-05-14
Attending: INTERNAL MEDICINE
Payer: MEDICARE

## 2018-05-14 PROCEDURE — 97110 THERAPEUTIC EXERCISES: CPT

## 2018-05-14 NOTE — PROGRESS NOTES
Patient Name: Macy Calloway, : 1930, MRN: [id number]   Date:  2018  Referring Physician:  Dr. Kath Neri  Diagnosis: B shoulder pain      Progress Summary    Pt has attended 12 visits in Physical Therapy.      Progress Note Start Date:  105; L 95  Extension: R 53; L 49  Abduction: R 102; L 100  ER: R 82; L 44  IR: R T10; L gluteal crest (to sacral base with assist) WNL      PROM:  Shoulder  Elbow   Flexion: R 145; L 120  Abduction: R 152; L 95  ER at 30deg abd: R 82; L 44  IR at 45deg abd Carrying, Moving and Handling ObjectsCJ: 20-39% impaired, limited, or restricted    Rehab Potential: good    Plan: Continue skilled Physical Therapy 2 x/week or a total of 6 visits over a 90 day period, tapering to 1x/wk as indicated.  Treatment will includ

## 2018-05-16 ENCOUNTER — APPOINTMENT (OUTPATIENT)
Dept: PHYSICAL THERAPY | Age: 83
End: 2018-05-16
Attending: INTERNAL MEDICINE
Payer: MEDICARE

## 2018-05-21 ENCOUNTER — APPOINTMENT (OUTPATIENT)
Dept: PHYSICAL THERAPY | Age: 83
End: 2018-05-21
Attending: INTERNAL MEDICINE
Payer: MEDICARE

## 2018-05-24 ENCOUNTER — APPOINTMENT (OUTPATIENT)
Dept: PHYSICAL THERAPY | Age: 83
End: 2018-05-24
Attending: ORTHOPAEDIC SURGERY
Payer: MEDICARE

## 2018-05-29 ENCOUNTER — TELEPHONE (OUTPATIENT)
Dept: PHYSICAL THERAPY | Age: 83
End: 2018-05-29

## 2018-06-07 ENCOUNTER — TELEPHONE (OUTPATIENT)
Dept: PHYSICAL THERAPY | Age: 83
End: 2018-06-07

## 2018-06-12 ENCOUNTER — TELEPHONE (OUTPATIENT)
Dept: PHYSICAL THERAPY | Age: 83
End: 2018-06-12

## 2018-06-13 ENCOUNTER — TELEPHONE (OUTPATIENT)
Dept: PHYSICAL THERAPY | Age: 83
End: 2018-06-13

## 2018-06-14 ENCOUNTER — APPOINTMENT (OUTPATIENT)
Dept: PHYSICAL THERAPY | Age: 83
End: 2018-06-14
Attending: ORTHOPAEDIC SURGERY
Payer: MEDICARE

## 2018-06-25 ENCOUNTER — APPOINTMENT (OUTPATIENT)
Dept: PHYSICAL THERAPY | Age: 83
End: 2018-06-25
Attending: INTERNAL MEDICINE
Payer: MEDICARE

## 2018-06-27 ENCOUNTER — APPOINTMENT (OUTPATIENT)
Dept: PHYSICAL THERAPY | Age: 83
End: 2018-06-27
Attending: INTERNAL MEDICINE
Payer: MEDICARE

## 2018-07-09 ENCOUNTER — APPOINTMENT (OUTPATIENT)
Dept: PHYSICAL THERAPY | Age: 83
End: 2018-07-09
Attending: INTERNAL MEDICINE
Payer: MEDICARE

## 2018-07-16 ENCOUNTER — APPOINTMENT (OUTPATIENT)
Dept: PHYSICAL THERAPY | Age: 83
End: 2018-07-16
Attending: INTERNAL MEDICINE
Payer: MEDICARE

## 2018-07-18 ENCOUNTER — OFFICE VISIT (OUTPATIENT)
Dept: PHYSICAL THERAPY | Age: 83
End: 2018-07-18
Attending: INTERNAL MEDICINE
Payer: MEDICARE

## 2018-07-18 PROCEDURE — 97164 PT RE-EVAL EST PLAN CARE: CPT

## 2018-07-18 PROCEDURE — 97110 THERAPEUTIC EXERCISES: CPT

## 2018-07-18 NOTE — PROGRESS NOTES
Patient Name: Shahram Herrera, : 1930, MRN: [id number]   Date:  2018  Referring Physician:  Dr. Manuela Neri    Diagnosis: B shoulder pain      Progress Summary    Pt has attended 13 visits in Physical Therapy.      Progress Note Start Date: abd: R 62; L 23  IR at 45deg abd: R 80; L 70 WNL      Strength/MMT:  Shoulder Elbow Scapula   Flexion: R 3+/5 (4+/5 within available range); L 3+/5 (4+/5 within available range)  Extension: R 4/5; L 4/5  Abduction: R 3+/5 (4+/5 within available range);  L 3 planning and for this course of care. Thank you for your referral. Please co-sign or sign and return this letter via fax as soon as possible to [phone]. If you have any questions, please contact me at Dept: [phone].     Sincerely,  Cony Queen

## 2018-07-23 ENCOUNTER — OFFICE VISIT (OUTPATIENT)
Dept: PHYSICAL THERAPY | Age: 83
End: 2018-07-23
Attending: INTERNAL MEDICINE
Payer: MEDICARE

## 2018-07-23 PROCEDURE — 97110 THERAPEUTIC EXERCISES: CPT

## 2018-07-23 NOTE — PROGRESS NOTES
B shoulder pain  Authorized # of Visits:  12  (per POC) (PN completed 4/9 at visit 8)     Next MD visit: none scheduled  Fall Risk: standard         Precautions: n/a           Medication Changes since last visit?: No  Subjective: Pt 15mins late for appt a RTC musculature focus L   Modalities MHP to B shoulders [id number] pre-tx  MHP to B shoulders x8mins MHP to B shoulders x6mins R, 8mins L pre-tx       Assessment: Pt able to maintain gains made at last session with decreased pain.  Progressed to RTB for rows a

## 2018-07-25 ENCOUNTER — OFFICE VISIT (OUTPATIENT)
Dept: PHYSICAL THERAPY | Age: 83
End: 2018-07-25
Attending: INTERNAL MEDICINE
Payer: MEDICARE

## 2018-07-25 PROCEDURE — 97110 THERAPEUTIC EXERCISES: CPT

## 2018-07-25 NOTE — PROGRESS NOTES
B shoulder pain  Authorized # of Visits:  12  (per POC) (PN completed 4/9 at visit 8)     Next MD visit: none scheduled  Fall Risk: standard         Precautions: n/a           Medication Changes since last visit?: No  Subjective: Pt reports shoulder feeli Manual Supine:  - Grade II GHJ mobs B Supine:   - Grade II/III GHJ mobs  - Gentle STM L anterior shoulder mms Supine:   - Grade II/III GHJ mobs Supine:  - Grade II GHJ mobs  - STM post RTC musculature focus L   Modalities MHP to B shoulders [id number] pre-t

## 2018-07-30 ENCOUNTER — OFFICE VISIT (OUTPATIENT)
Dept: PHYSICAL THERAPY | Age: 83
End: 2018-07-30
Attending: INTERNAL MEDICINE
Payer: MEDICARE

## 2018-07-30 PROCEDURE — 97110 THERAPEUTIC EXERCISES: CPT

## 2018-07-30 NOTE — PROGRESS NOTES
B shoulder pain  Authorized # of Visits:  18  (per POC) (PN completed 4/9 at visit 8)     Next MD visit: none scheduled  Fall Risk: standard         Precautions: n/a           Medication Changes since last visit?: No  Subjective: Pt reports helped her sy Supine:   - Grade II/III GHJ mobs Supine:  - Grade II GHJ mobs  - STM post RTC musculature focus L   Modalities MHP to B shoulders [id number] pre-tx          Assessment: Progressed to GTB for strengthening. Pt able to perform with good control.  Some improveme

## 2018-08-02 ENCOUNTER — OFFICE VISIT (OUTPATIENT)
Dept: PHYSICAL THERAPY | Age: 83
End: 2018-08-02
Attending: INTERNAL MEDICINE
Payer: MEDICARE

## 2018-08-02 PROCEDURE — 97110 THERAPEUTIC EXERCISES: CPT

## 2018-08-02 NOTE — PROGRESS NOTES
B shoulder pain  Authorized # of Visits:  18  (per POC) (PN completed 4/9 at visit 8)     Next MD visit: none scheduled  Fall Risk: standard         Precautions: n/a           Medication Changes since last visit?: No  Subjective: Pt reports shoulder has b J mobs  - STM post RTC musculature focus L       Assessment: Progressed to use of weights for supine strengthening. Pt able to tolerate with greater control. Progressed repetitions. Pt able to perform with improved control.     Patient Education: Progress

## 2018-08-09 ENCOUNTER — TELEPHONE (OUTPATIENT)
Dept: PHYSICAL THERAPY | Age: 83
End: 2018-08-09

## 2018-08-13 ENCOUNTER — OFFICE VISIT (OUTPATIENT)
Dept: PHYSICAL THERAPY | Age: 83
End: 2018-08-13
Attending: INTERNAL MEDICINE
Payer: MEDICARE

## 2018-08-13 PROCEDURE — 97110 THERAPEUTIC EXERCISES: CPT

## 2018-08-13 NOTE — PROGRESS NOTES
Patient Name: Duane Seller, : 1930, MRN: [id number]   Date:  2018  Referring Physician:   Dr. Emerald Neri    Diagnosis: B shoulder pain      Discharge Summary    Pt has attended 18 visits in Physical Therapy.      Progress Note Start Date 4/5; L 4-/5  IR: R 4+/5; L 4/5 Flexion: R 4+/5; L 4+/5  Extension: R 4+/5; L 4+/5 Grossly: R: 4-/5; L: 4-/5      Joint Mobility:  Scapula: decreased into adduction (although improved)  GH: dec into abd and flex on L     Palpation: TTP at upper arm B  Sensa certification required: Yes  I certify the need for these services furnished under this plan of treatment and while under my care.     X___________________________________________________ Date____________________    Certification From: 2/75/9677  To:11/11/2

## 2018-08-16 ENCOUNTER — APPOINTMENT (OUTPATIENT)
Dept: PHYSICAL THERAPY | Age: 83
End: 2018-08-16
Attending: INTERNAL MEDICINE
Payer: MEDICARE

## 2018-11-02 VITALS
TEMPERATURE: 98.4 F | RESPIRATION RATE: 16 BRPM | SYSTOLIC BLOOD PRESSURE: 120 MMHG | HEIGHT: 62 IN | DIASTOLIC BLOOD PRESSURE: 80 MMHG | HEART RATE: 80 BPM | WEIGHT: 121.36 LBS | BODY MASS INDEX: 22.33 KG/M2

## 2018-11-15 NOTE — ED NOTES
"""Informed patient that their cataract(s) are not visually significant or do not meet the criteria for cataract surgery.  Recommended attention to common cataract symptoms Pt came in with , states she felt lightheaded, denies pre or post syncope

## 2018-11-21 ENCOUNTER — LAB ENCOUNTER (OUTPATIENT)
Dept: LAB | Age: 83
End: 2018-11-21
Attending: INTERNAL MEDICINE
Payer: MEDICARE

## 2018-11-21 DIAGNOSIS — R53.83 FATIGUE, UNSPECIFIED TYPE: ICD-10-CM

## 2018-11-21 DIAGNOSIS — N18.30 CHRONIC RENAL INSUFFICIENCY, STAGE 3 (MODERATE) (HCC): ICD-10-CM

## 2018-11-21 PROCEDURE — 85025 COMPLETE CBC W/AUTO DIFF WBC: CPT

## 2018-11-21 PROCEDURE — 36415 COLL VENOUS BLD VENIPUNCTURE: CPT

## 2018-11-21 PROCEDURE — 84443 ASSAY THYROID STIM HORMONE: CPT

## 2018-11-21 PROCEDURE — 80069 RENAL FUNCTION PANEL: CPT

## 2018-11-21 PROCEDURE — 80076 HEPATIC FUNCTION PANEL: CPT

## 2018-12-07 PROBLEM — I10 ESSENTIAL HYPERTENSION: Status: ACTIVE | Noted: 2018-12-07

## 2018-12-07 PROBLEM — I35.0 NONRHEUMATIC AORTIC VALVE STENOSIS: Status: ACTIVE | Noted: 2017-09-26

## 2019-03-08 PROBLEM — I35.0 SEVERE CALCIFIC AORTIC VALVE STENOSIS: Status: ACTIVE | Noted: 2019-03-08

## 2019-03-11 PROBLEM — D50.0 IRON DEFICIENCY ANEMIA DUE TO CHRONIC BLOOD LOSS: Status: ACTIVE | Noted: 2019-03-11

## 2019-03-11 PROBLEM — G89.29 CHRONIC LEFT SHOULDER PAIN: Status: ACTIVE | Noted: 2019-03-11

## 2019-03-11 PROBLEM — M25.512 CHRONIC LEFT SHOULDER PAIN: Status: ACTIVE | Noted: 2019-03-11

## 2019-06-17 ENCOUNTER — LAB ENCOUNTER (OUTPATIENT)
Dept: LAB | Age: 84
End: 2019-06-17
Attending: INTERNAL MEDICINE
Payer: MEDICARE

## 2019-06-17 DIAGNOSIS — D64.9 ANEMIA, UNSPECIFIED TYPE: ICD-10-CM

## 2019-06-17 DIAGNOSIS — M19.90 ARTHRITIS: ICD-10-CM

## 2019-06-17 DIAGNOSIS — N18.30 CHRONIC RENAL INSUFFICIENCY, STAGE 3 (MODERATE) (HCC): ICD-10-CM

## 2019-06-17 DIAGNOSIS — E03.9 HYPOTHYROIDISM, UNSPECIFIED TYPE: ICD-10-CM

## 2019-06-17 PROCEDURE — 80069 RENAL FUNCTION PANEL: CPT

## 2019-06-17 PROCEDURE — 85025 COMPLETE CBC W/AUTO DIFF WBC: CPT

## 2019-06-17 PROCEDURE — 36415 COLL VENOUS BLD VENIPUNCTURE: CPT

## 2019-06-17 PROCEDURE — 85652 RBC SED RATE AUTOMATED: CPT

## 2019-06-17 PROCEDURE — 84466 ASSAY OF TRANSFERRIN: CPT

## 2019-06-17 PROCEDURE — 84443 ASSAY THYROID STIM HORMONE: CPT

## 2019-06-17 PROCEDURE — 83540 ASSAY OF IRON: CPT

## 2019-09-26 ENCOUNTER — LAB ENCOUNTER (OUTPATIENT)
Dept: LAB | Age: 84
End: 2019-09-26
Attending: INTERNAL MEDICINE
Payer: MEDICARE

## 2019-09-26 DIAGNOSIS — Z13.0 SCREENING FOR DISORDER OF BLOOD AND BLOOD-FORMING ORGANS: ICD-10-CM

## 2019-09-26 DIAGNOSIS — E78.00 ELEVATED CHOLESTEROL: ICD-10-CM

## 2019-09-26 DIAGNOSIS — Z13.228 ENCOUNTER FOR SCREENING FOR METABOLIC DISORDER: ICD-10-CM

## 2019-09-26 LAB
ALBUMIN SERPL-MCNC: 3.9 G/DL (ref 3.4–5)
ALBUMIN/GLOB SERPL: 1 {RATIO} (ref 1–2)
ALP LIVER SERPL-CCNC: 75 U/L (ref 55–142)
ALT SERPL-CCNC: 26 U/L (ref 13–56)
ANION GAP SERPL CALC-SCNC: 8 MMOL/L (ref 0–18)
AST SERPL-CCNC: 33 U/L (ref 15–37)
BASOPHILS # BLD AUTO: 0.05 X10(3) UL (ref 0–0.2)
BASOPHILS NFR BLD AUTO: 1 %
BILIRUB SERPL-MCNC: 0.5 MG/DL (ref 0.1–2)
BUN BLD-MCNC: 20 MG/DL (ref 7–18)
BUN/CREAT SERPL: 17.1 (ref 10–20)
CALCIUM BLD-MCNC: 9.7 MG/DL (ref 8.5–10.1)
CHLORIDE SERPL-SCNC: 98 MMOL/L (ref 98–112)
CHOLEST SMN-MCNC: 224 MG/DL (ref ?–200)
CO2 SERPL-SCNC: 29 MMOL/L (ref 21–32)
CREAT BLD-MCNC: 1.17 MG/DL (ref 0.55–1.02)
DEPRECATED RDW RBC AUTO: 45.8 FL (ref 35.1–46.3)
EOSINOPHIL # BLD AUTO: 0.23 X10(3) UL (ref 0–0.7)
EOSINOPHIL NFR BLD AUTO: 4.4 %
ERYTHROCYTE [DISTWIDTH] IN BLOOD BY AUTOMATED COUNT: 13.4 % (ref 11–15)
GLOBULIN PLAS-MCNC: 3.9 G/DL (ref 2.8–4.4)
GLUCOSE BLD-MCNC: 92 MG/DL (ref 70–99)
HCT VFR BLD AUTO: 40.2 % (ref 35–48)
HDLC SERPL-MCNC: 124 MG/DL (ref 40–59)
HGB BLD-MCNC: 13.3 G/DL (ref 12–16)
IMM GRANULOCYTES # BLD AUTO: 0.01 X10(3) UL (ref 0–1)
IMM GRANULOCYTES NFR BLD: 0.2 %
LDLC SERPL CALC-MCNC: 87 MG/DL (ref ?–100)
LYMPHOCYTES # BLD AUTO: 1.17 X10(3) UL (ref 1–4)
LYMPHOCYTES NFR BLD AUTO: 22.5 %
M PROTEIN MFR SERPL ELPH: 7.8 G/DL (ref 6.4–8.2)
MCH RBC QN AUTO: 30.6 PG (ref 26–34)
MCHC RBC AUTO-ENTMCNC: 33.1 G/DL (ref 31–37)
MCV RBC AUTO: 92.6 FL (ref 80–100)
MONOCYTES # BLD AUTO: 0.56 X10(3) UL (ref 0.1–1)
MONOCYTES NFR BLD AUTO: 10.7 %
NEUTROPHILS # BLD AUTO: 3.19 X10 (3) UL (ref 1.5–7.7)
NEUTROPHILS # BLD AUTO: 3.19 X10(3) UL (ref 1.5–7.7)
NEUTROPHILS NFR BLD AUTO: 61.2 %
NONHDLC SERPL-MCNC: 100 MG/DL (ref ?–130)
OSMOLALITY SERPL CALC.SUM OF ELEC: 282 MOSM/KG (ref 275–295)
PATIENT FASTING: YES
PATIENT FASTING: YES
PLATELET # BLD AUTO: 258 10(3)UL (ref 150–450)
POTASSIUM SERPL-SCNC: 3.4 MMOL/L (ref 3.5–5.1)
RBC # BLD AUTO: 4.34 X10(6)UL (ref 3.8–5.3)
SODIUM SERPL-SCNC: 135 MMOL/L (ref 136–145)
TRIGL SERPL-MCNC: 67 MG/DL (ref 30–149)
VLDLC SERPL CALC-MCNC: 13 MG/DL (ref 0–30)
WBC # BLD AUTO: 5.2 X10(3) UL (ref 4–11)

## 2019-09-26 PROCEDURE — 36415 COLL VENOUS BLD VENIPUNCTURE: CPT

## 2019-09-26 PROCEDURE — 80053 COMPREHEN METABOLIC PANEL: CPT

## 2019-09-26 PROCEDURE — 85025 COMPLETE CBC W/AUTO DIFF WBC: CPT

## 2019-09-26 PROCEDURE — 80061 LIPID PANEL: CPT

## 2019-09-26 RX ORDER — OMEGA-3 FATTY ACIDS/FISH OIL 300-1000MG
1 CAPSULE ORAL DAILY
Status: ON HOLD | COMMUNITY
End: 2021-07-08 | Stop reason: HOSPADM

## 2019-09-26 RX ORDER — CLINDAMYCIN HYDROCHLORIDE 300 MG/1
300 CAPSULE ORAL 3 TIMES DAILY
Status: ON HOLD | COMMUNITY
Start: 2013-09-29 | End: 2021-07-08 | Stop reason: HOSPADM

## 2019-09-26 RX ORDER — SERTRALINE HYDROCHLORIDE 100 MG/1
100 TABLET, FILM COATED ORAL DAILY
Status: ON HOLD | COMMUNITY
End: 2021-07-08 | Stop reason: HOSPADM

## 2019-09-26 RX ORDER — TRIAMTERENE AND HYDROCHLOROTHIAZIDE 37.5; 25 MG/1; MG/1
1 TABLET ORAL DAILY
COMMUNITY
Start: 2019-06-19

## 2019-09-26 RX ORDER — TRAMADOL HYDROCHLORIDE 50 MG/1
TABLET ORAL
Status: ON HOLD | COMMUNITY
Start: 2019-03-11 | End: 2021-07-08 | Stop reason: HOSPADM

## 2019-09-30 ENCOUNTER — APPOINTMENT (OUTPATIENT)
Dept: CARDIOLOGY | Age: 84
End: 2019-09-30

## 2019-10-03 ENCOUNTER — HOSPITAL ENCOUNTER (OUTPATIENT)
Dept: GENERAL RADIOLOGY | Facility: HOSPITAL | Age: 84
Discharge: HOME | End: 2019-10-03
Attending: ORTHOPAEDIC SURGERY
Payer: MEDICARE

## 2019-10-03 DIAGNOSIS — M25.511 RIGHT SHOULDER PAIN: ICD-10-CM

## 2019-10-03 DIAGNOSIS — M25.512 SHOULDER PAIN, BILATERAL: ICD-10-CM

## 2019-10-03 DIAGNOSIS — M25.511 SHOULDER PAIN, BILATERAL: ICD-10-CM

## 2019-10-03 PROCEDURE — 73030 X-RAY EXAM OF SHOULDER: CPT | Performed by: ORTHOPAEDIC SURGERY

## 2019-11-06 PROBLEM — D50.9 IRON DEFICIENCY ANEMIA: Status: ACTIVE | Noted: 2019-11-06

## 2019-11-06 PROBLEM — F32.9 REACTIVE DEPRESSION: Status: ACTIVE | Noted: 2019-11-06

## 2019-11-06 PROBLEM — I10 ESSENTIAL HYPERTENSION: Status: ACTIVE | Noted: 2019-11-06

## 2019-11-06 PROBLEM — I35.0 AORTIC VALVE STENOSIS: Status: ACTIVE | Noted: 2019-11-06

## 2019-11-06 PROBLEM — G89.29 CHRONIC LEFT SHOULDER PAIN: Status: ACTIVE | Noted: 2019-11-06

## 2019-11-06 PROBLEM — M25.512 CHRONIC LEFT SHOULDER PAIN: Status: ACTIVE | Noted: 2019-11-06

## 2019-11-06 PROBLEM — M19.90 ARTHRITIS: Status: ACTIVE | Noted: 2019-11-06

## 2019-11-06 PROBLEM — F41.9 ANXIETY: Status: ACTIVE | Noted: 2019-11-06

## 2019-11-06 PROBLEM — N18.9 CHRONIC RENAL INSUFFICIENCY: Status: ACTIVE | Noted: 2019-11-06

## 2020-01-01 ENCOUNTER — SNF VISIT (OUTPATIENT)
Dept: INTERNAL MEDICINE CLINIC | Facility: SKILLED NURSING FACILITY | Age: 85
End: 2020-01-01

## 2020-01-01 ENCOUNTER — HOSPITAL ENCOUNTER (OUTPATIENT)
Facility: HOSPITAL | Age: 85
Setting detail: OBSERVATION
Discharge: NURSING, REHAB OR LONG-TERM CARE | End: 2020-01-01
Attending: EMERGENCY MEDICINE | Admitting: HOSPITALIST
Payer: MEDICARE

## 2020-01-01 ENCOUNTER — APPOINTMENT (OUTPATIENT)
Dept: GENERAL RADIOLOGY | Facility: HOSPITAL | Age: 85
End: 2020-01-01
Payer: MEDICARE

## 2020-01-01 ENCOUNTER — SNF ADMIT/H&P (OUTPATIENT)
Dept: INTERNAL MEDICINE CLINIC | Facility: SKILLED NURSING FACILITY | Age: 85
End: 2020-01-01

## 2020-01-01 VITALS
DIASTOLIC BLOOD PRESSURE: 75 MMHG | RESPIRATION RATE: 20 BRPM | OXYGEN SATURATION: 97 % | SYSTOLIC BLOOD PRESSURE: 133 MMHG | HEART RATE: 94 BPM | TEMPERATURE: 98 F

## 2020-01-01 DIAGNOSIS — E87.1 HYPONATREMIA: ICD-10-CM

## 2020-01-01 DIAGNOSIS — M25.061 HEMARTHROSIS, RIGHT KNEE: Primary | ICD-10-CM

## 2020-01-01 DIAGNOSIS — R53.1 WEAKNESS: ICD-10-CM

## 2020-01-01 DIAGNOSIS — I48.0 PAROXYSMAL ATRIAL FIBRILLATION (HCC): ICD-10-CM

## 2020-01-01 DIAGNOSIS — M25.061 HEMARTHROSIS OF KNEE JOINT, RIGHT: ICD-10-CM

## 2020-01-01 DIAGNOSIS — I10 ESSENTIAL HYPERTENSION: ICD-10-CM

## 2020-01-01 DIAGNOSIS — M25.061 HEMARTHROSIS, RIGHT KNEE: ICD-10-CM

## 2020-01-01 DIAGNOSIS — I48.91 ATRIAL FIBRILLATION, UNSPECIFIED TYPE (HCC): ICD-10-CM

## 2020-01-01 DIAGNOSIS — R60.0 EDEMA OF LOWER EXTREMITY: ICD-10-CM

## 2020-01-01 DIAGNOSIS — M25.061 KNEE HEMARTHROSIS, RIGHT: ICD-10-CM

## 2020-01-01 PROCEDURE — 20610 DRAIN/INJ JOINT/BURSA W/O US: CPT

## 2020-01-01 PROCEDURE — 80048 BASIC METABOLIC PNL TOTAL CA: CPT | Performed by: HOSPITALIST

## 2020-01-01 PROCEDURE — 97166 OT EVAL MOD COMPLEX 45 MIN: CPT

## 2020-01-01 PROCEDURE — 83735 ASSAY OF MAGNESIUM: CPT | Performed by: HOSPITALIST

## 2020-01-01 PROCEDURE — 87205 SMEAR GRAM STAIN: CPT | Performed by: EMERGENCY MEDICINE

## 2020-01-01 PROCEDURE — 73560 X-RAY EXAM OF KNEE 1 OR 2: CPT

## 2020-01-01 PROCEDURE — 85025 COMPLETE CBC W/AUTO DIFF WBC: CPT | Performed by: HOSPITALIST

## 2020-01-01 PROCEDURE — 96374 THER/PROPH/DIAG INJ IV PUSH: CPT

## 2020-01-01 PROCEDURE — 1123F ACP DISCUSS/DSCN MKR DOCD: CPT | Performed by: NURSE PRACTITIONER

## 2020-01-01 PROCEDURE — 99308 SBSQ NF CARE LOW MDM 20: CPT | Performed by: NURSE PRACTITIONER

## 2020-01-01 PROCEDURE — 89051 BODY FLUID CELL COUNT: CPT | Performed by: EMERGENCY MEDICINE

## 2020-01-01 PROCEDURE — 89050 BODY FLUID CELL COUNT: CPT | Performed by: EMERGENCY MEDICINE

## 2020-01-01 PROCEDURE — 97530 THERAPEUTIC ACTIVITIES: CPT

## 2020-01-01 PROCEDURE — 96375 TX/PRO/DX INJ NEW DRUG ADDON: CPT

## 2020-01-01 PROCEDURE — 97162 PT EVAL MOD COMPLEX 30 MIN: CPT

## 2020-01-01 PROCEDURE — 99310 SBSQ NF CARE HIGH MDM 45: CPT | Performed by: NURSE PRACTITIONER

## 2020-01-01 PROCEDURE — 96376 TX/PRO/DX INJ SAME DRUG ADON: CPT

## 2020-01-01 PROCEDURE — 87070 CULTURE OTHR SPECIMN AEROBIC: CPT | Performed by: EMERGENCY MEDICINE

## 2020-01-01 PROCEDURE — 89060 EXAM SYNOVIAL FLUID CRYSTALS: CPT | Performed by: EMERGENCY MEDICINE

## 2020-01-01 PROCEDURE — 99285 EMERGENCY DEPT VISIT HI MDM: CPT

## 2020-01-01 RX ORDER — DEXAMETHASONE SODIUM PHOSPHATE 10 MG/ML
10 INJECTION, SOLUTION INTRAMUSCULAR; INTRAVENOUS ONCE
Status: COMPLETED | OUTPATIENT
Start: 2020-01-01 | End: 2020-01-01

## 2020-01-01 RX ORDER — BISACODYL 10 MG
10 SUPPOSITORY, RECTAL RECTAL
Status: DISCONTINUED | OUTPATIENT
Start: 2020-01-01 | End: 2020-01-01

## 2020-01-01 RX ORDER — KETOROLAC TROMETHAMINE 15 MG/ML
15 INJECTION, SOLUTION INTRAMUSCULAR; INTRAVENOUS ONCE
Status: COMPLETED | OUTPATIENT
Start: 2020-01-01 | End: 2020-01-01

## 2020-01-01 RX ORDER — MORPHINE SULFATE 4 MG/ML
2 INJECTION, SOLUTION INTRAMUSCULAR; INTRAVENOUS ONCE
Status: COMPLETED | OUTPATIENT
Start: 2020-01-01 | End: 2020-01-01

## 2020-01-01 RX ORDER — HYDROCODONE BITARTRATE AND ACETAMINOPHEN 5; 325 MG/1; MG/1
1 TABLET ORAL EVERY 4 HOURS PRN
Qty: 30 TABLET | Refills: 0 | Status: ON HOLD | OUTPATIENT
Start: 2020-01-01 | End: 2021-01-01

## 2020-01-01 RX ORDER — ACETAMINOPHEN 325 MG/1
650 TABLET ORAL EVERY 4 HOURS PRN
Status: DISCONTINUED | OUTPATIENT
Start: 2020-01-01 | End: 2020-01-01

## 2020-01-01 RX ORDER — ACETAMINOPHEN AND CODEINE PHOSPHATE 300; 30 MG/1; MG/1
1 TABLET ORAL EVERY 8 HOURS PRN
Qty: 15 TABLET | Refills: 0 | Status: SHIPPED | OUTPATIENT
Start: 2020-01-01 | End: 2020-01-01

## 2020-01-01 RX ORDER — KETOROLAC TROMETHAMINE 15 MG/ML
INJECTION, SOLUTION INTRAMUSCULAR; INTRAVENOUS
Status: COMPLETED
Start: 2020-01-01 | End: 2020-01-01

## 2020-01-01 RX ORDER — ACETAMINOPHEN 500 MG
1000 TABLET ORAL ONCE
Status: DISCONTINUED | OUTPATIENT
Start: 2020-01-01 | End: 2020-01-01

## 2020-01-01 RX ORDER — MIRTAZAPINE 7.5 MG/1
7.5 TABLET, FILM COATED ORAL NIGHTLY
Status: DISCONTINUED | OUTPATIENT
Start: 2020-01-01 | End: 2020-01-01

## 2020-01-01 RX ORDER — DILTIAZEM HYDROCHLORIDE 120 MG/1
120 CAPSULE, EXTENDED RELEASE ORAL DAILY
Status: DISCONTINUED | OUTPATIENT
Start: 2020-01-01 | End: 2020-01-01

## 2020-01-01 RX ORDER — POLYETHYLENE GLYCOL 3350 17 G/17G
17 POWDER, FOR SOLUTION ORAL DAILY PRN
Status: DISCONTINUED | OUTPATIENT
Start: 2020-01-01 | End: 2020-01-01

## 2020-01-01 RX ORDER — SODIUM PHOSPHATE, DIBASIC AND SODIUM PHOSPHATE, MONOBASIC 7; 19 G/133ML; G/133ML
1 ENEMA RECTAL ONCE AS NEEDED
Status: DISCONTINUED | OUTPATIENT
Start: 2020-01-01 | End: 2020-01-01

## 2020-01-01 RX ORDER — SODIUM CHLORIDE 0.9 % (FLUSH) 0.9 %
3 SYRINGE (ML) INJECTION AS NEEDED
Status: DISCONTINUED | OUTPATIENT
Start: 2020-01-01 | End: 2020-01-01

## 2020-01-01 RX ORDER — PSEUDOEPHEDRINE HCL 30 MG
100 TABLET ORAL 2 TIMES DAILY
Qty: 30 CAPSULE | Refills: 0 | Status: SHIPPED | OUTPATIENT
Start: 2020-01-01

## 2020-01-01 RX ORDER — MORPHINE SULFATE 4 MG/ML
INJECTION, SOLUTION INTRAMUSCULAR; INTRAVENOUS
Status: COMPLETED
Start: 2020-01-01 | End: 2020-01-01

## 2020-01-01 RX ORDER — HYDROCODONE BITARTRATE AND ACETAMINOPHEN 5; 325 MG/1; MG/1
1 TABLET ORAL EVERY 4 HOURS PRN
Status: DISCONTINUED | OUTPATIENT
Start: 2020-01-01 | End: 2020-01-01

## 2020-01-01 RX ORDER — HYDROCODONE BITARTRATE AND ACETAMINOPHEN 5; 325 MG/1; MG/1
2 TABLET ORAL EVERY 4 HOURS PRN
Status: DISCONTINUED | OUTPATIENT
Start: 2020-01-01 | End: 2020-01-01

## 2020-01-01 RX ORDER — ACETAMINOPHEN 325 MG/1
650 TABLET ORAL EVERY 4 HOURS PRN
Qty: 10 TABLET | Refills: 0 | Status: SHIPPED | OUTPATIENT
Start: 2020-01-01

## 2020-01-01 RX ORDER — TRIAMTERENE AND HYDROCHLOROTHIAZIDE 37.5; 25 MG/1; MG/1
1 CAPSULE ORAL DAILY
Status: DISCONTINUED | OUTPATIENT
Start: 2020-01-01 | End: 2020-01-01

## 2020-01-01 RX ORDER — TRAMADOL HYDROCHLORIDE 50 MG/1
50 TABLET ORAL ONCE
Status: COMPLETED | OUTPATIENT
Start: 2020-01-01 | End: 2020-01-01

## 2020-01-01 RX ORDER — DOCUSATE SODIUM 100 MG/1
100 CAPSULE, LIQUID FILLED ORAL 2 TIMES DAILY
Status: DISCONTINUED | OUTPATIENT
Start: 2020-01-01 | End: 2020-01-01

## 2020-01-07 PROBLEM — R26.89 BALANCE DISORDER: Status: ACTIVE | Noted: 2020-01-07

## 2020-03-01 ENCOUNTER — APPOINTMENT (OUTPATIENT)
Dept: GENERAL RADIOLOGY | Facility: HOSPITAL | Age: 85
DRG: 310 | End: 2020-03-01
Attending: EMERGENCY MEDICINE
Payer: MEDICARE

## 2020-03-01 ENCOUNTER — HOSPITAL ENCOUNTER (INPATIENT)
Facility: HOSPITAL | Age: 85
LOS: 3 days | Discharge: HOME HEALTH | DRG: 310 | End: 2020-03-04
Attending: EMERGENCY MEDICINE | Admitting: HOSPITALIST
Payer: MEDICARE

## 2020-03-01 DIAGNOSIS — I48.91 ATRIAL FIBRILLATION WITH RAPID VENTRICULAR RESPONSE (HCC): Primary | ICD-10-CM

## 2020-03-01 LAB
ADENOVIRUS PCR:: NEGATIVE
ANION GAP SERPL CALC-SCNC: 8 MMOL/L (ref 0–18)
B PERT DNA SPEC QL NAA+PROBE: NEGATIVE
BACTERIA UR QL AUTO: NEGATIVE /HPF
BASOPHILS # BLD AUTO: 0.03 X10(3) UL (ref 0–0.2)
BASOPHILS NFR BLD AUTO: 0.3 %
BILIRUB UR QL: NEGATIVE
BUN BLD-MCNC: 29 MG/DL (ref 7–18)
BUN/CREAT SERPL: 26.9 (ref 10–20)
C PNEUM DNA SPEC QL NAA+PROBE: NEGATIVE
CALCIUM BLD-MCNC: 10.2 MG/DL (ref 8.5–10.1)
CHLORIDE SERPL-SCNC: 98 MMOL/L (ref 98–112)
CLARITY UR: CLEAR
CO2 SERPL-SCNC: 29 MMOL/L (ref 21–32)
COLOR UR: YELLOW
CORONAVIRUS 229E PCR:: NEGATIVE
CORONAVIRUS HKU1 PCR:: NEGATIVE
CORONAVIRUS NL63 PCR:: NEGATIVE
CORONAVIRUS OC43 PCR:: NEGATIVE
CREAT BLD-MCNC: 1.08 MG/DL (ref 0.55–1.02)
DEPRECATED RDW RBC AUTO: 41.8 FL (ref 35.1–46.3)
EOSINOPHIL # BLD AUTO: 0.02 X10(3) UL (ref 0–0.7)
EOSINOPHIL NFR BLD AUTO: 0.2 %
ERYTHROCYTE [DISTWIDTH] IN BLOOD BY AUTOMATED COUNT: 12.2 % (ref 11–15)
FLUAV + FLUBV RNA SPEC NAA+PROBE: NEGATIVE
FLUAV RNA SPEC QL NAA+PROBE: NEGATIVE
FLUBV RNA SPEC QL NAA+PROBE: NEGATIVE
GLUCOSE BLD-MCNC: 126 MG/DL (ref 70–99)
GLUCOSE UR-MCNC: NEGATIVE MG/DL
HCT VFR BLD AUTO: 37.7 % (ref 35–48)
HGB BLD-MCNC: 12.7 G/DL (ref 12–16)
IMM GRANULOCYTES # BLD AUTO: 0.04 X10(3) UL (ref 0–1)
IMM GRANULOCYTES NFR BLD: 0.3 %
LEUKOCYTE ESTERASE UR QL STRIP.AUTO: NEGATIVE
LYMPHOCYTES # BLD AUTO: 0.9 X10(3) UL (ref 1–4)
LYMPHOCYTES NFR BLD AUTO: 7.6 %
MCH RBC QN AUTO: 31.2 PG (ref 26–34)
MCHC RBC AUTO-ENTMCNC: 33.7 G/DL (ref 31–37)
MCV RBC AUTO: 92.6 FL (ref 80–100)
METAPNEUMOVIRUS PCR:: NEGATIVE
MONOCYTES # BLD AUTO: 1.22 X10(3) UL (ref 0.1–1)
MONOCYTES NFR BLD AUTO: 10.3 %
MYCOPLASMA PNEUMONIA PCR:: NEGATIVE
NEUTROPHILS # BLD AUTO: 9.66 X10 (3) UL (ref 1.5–7.7)
NEUTROPHILS # BLD AUTO: 9.66 X10(3) UL (ref 1.5–7.7)
NEUTROPHILS NFR BLD AUTO: 81.3 %
NITRITE UR QL STRIP.AUTO: NEGATIVE
NT-PROBNP SERPL-MCNC: 1209 PG/ML (ref ?–450)
OSMOLALITY SERPL CALC.SUM OF ELEC: 287 MOSM/KG (ref 275–295)
PARAINFLUENZA 1 PCR:: NEGATIVE
PARAINFLUENZA 2 PCR:: NEGATIVE
PARAINFLUENZA 3 PCR:: NEGATIVE
PARAINFLUENZA 4 PCR:: NEGATIVE
PH UR: 5 [PH] (ref 5–8)
PLATELET # BLD AUTO: 241 10(3)UL (ref 150–450)
POTASSIUM SERPL-SCNC: 3.4 MMOL/L (ref 3.5–5.1)
PROT UR-MCNC: NEGATIVE MG/DL
RBC # BLD AUTO: 4.07 X10(6)UL (ref 3.8–5.3)
RBC #/AREA URNS AUTO: 7 /HPF
RHINOVIRUS/ENTERO PCR:: NEGATIVE
RSV RNA SPEC QL NAA+PROBE: NEGATIVE
SODIUM SERPL-SCNC: 135 MMOL/L (ref 136–145)
SP GR UR STRIP: 1.02 (ref 1–1.03)
TROPONIN I SERPL-MCNC: <0.045 NG/ML (ref ?–0.04)
TSI SER-ACNC: 3.53 MIU/ML (ref 0.36–3.74)
TSI SER-ACNC: 3.73 MIU/ML (ref 0.36–3.74)
UROBILINOGEN UR STRIP-ACNC: <2
WBC # BLD AUTO: 11.9 X10(3) UL (ref 4–11)
WBC #/AREA URNS AUTO: 1 /HPF

## 2020-03-01 PROCEDURE — 84484 ASSAY OF TROPONIN QUANT: CPT | Performed by: EMERGENCY MEDICINE

## 2020-03-01 PROCEDURE — 96376 TX/PRO/DX INJ SAME DRUG ADON: CPT

## 2020-03-01 PROCEDURE — 71045 X-RAY EXAM CHEST 1 VIEW: CPT | Performed by: EMERGENCY MEDICINE

## 2020-03-01 PROCEDURE — 87633 RESP VIRUS 12-25 TARGETS: CPT | Performed by: HOSPITALIST

## 2020-03-01 PROCEDURE — 84443 ASSAY THYROID STIM HORMONE: CPT | Performed by: HOSPITALIST

## 2020-03-01 PROCEDURE — 96365 THER/PROPH/DIAG IV INF INIT: CPT

## 2020-03-01 PROCEDURE — 83880 ASSAY OF NATRIURETIC PEPTIDE: CPT | Performed by: EMERGENCY MEDICINE

## 2020-03-01 PROCEDURE — 87486 CHLMYD PNEUM DNA AMP PROBE: CPT | Performed by: HOSPITALIST

## 2020-03-01 PROCEDURE — 93010 ELECTROCARDIOGRAM REPORT: CPT | Performed by: EMERGENCY MEDICINE

## 2020-03-01 PROCEDURE — 87631 RESP VIRUS 3-5 TARGETS: CPT | Performed by: EMERGENCY MEDICINE

## 2020-03-01 PROCEDURE — 81001 URINALYSIS AUTO W/SCOPE: CPT | Performed by: EMERGENCY MEDICINE

## 2020-03-01 PROCEDURE — 87581 M.PNEUMON DNA AMP PROBE: CPT | Performed by: HOSPITALIST

## 2020-03-01 PROCEDURE — 99285 EMERGENCY DEPT VISIT HI MDM: CPT

## 2020-03-01 PROCEDURE — 85025 COMPLETE CBC W/AUTO DIFF WBC: CPT | Performed by: EMERGENCY MEDICINE

## 2020-03-01 PROCEDURE — 93005 ELECTROCARDIOGRAM TRACING: CPT

## 2020-03-01 PROCEDURE — 87798 DETECT AGENT NOS DNA AMP: CPT | Performed by: HOSPITALIST

## 2020-03-01 PROCEDURE — 84443 ASSAY THYROID STIM HORMONE: CPT | Performed by: INTERNAL MEDICINE

## 2020-03-01 PROCEDURE — 80048 BASIC METABOLIC PNL TOTAL CA: CPT | Performed by: EMERGENCY MEDICINE

## 2020-03-01 RX ORDER — ACETAMINOPHEN 325 MG/1
650 TABLET ORAL EVERY 6 HOURS PRN
Status: DISCONTINUED | OUTPATIENT
Start: 2020-03-01 | End: 2020-03-04

## 2020-03-01 RX ORDER — ASPIRIN 81 MG/1
324 TABLET, CHEWABLE ORAL ONCE
Status: COMPLETED | OUTPATIENT
Start: 2020-03-01 | End: 2020-03-01

## 2020-03-01 RX ORDER — ONDANSETRON 2 MG/ML
4 INJECTION INTRAMUSCULAR; INTRAVENOUS EVERY 6 HOURS PRN
Status: DISCONTINUED | OUTPATIENT
Start: 2020-03-01 | End: 2020-03-04

## 2020-03-01 RX ORDER — DILTIAZEM HYDROCHLORIDE 5 MG/ML
10 INJECTION INTRAVENOUS ONCE
Status: COMPLETED | OUTPATIENT
Start: 2020-03-01 | End: 2020-03-01

## 2020-03-01 RX ORDER — SODIUM CHLORIDE 0.9 % (FLUSH) 0.9 %
3 SYRINGE (ML) INJECTION AS NEEDED
Status: DISCONTINUED | OUTPATIENT
Start: 2020-03-01 | End: 2020-03-04

## 2020-03-01 RX ORDER — TRAMADOL HYDROCHLORIDE 50 MG/1
50 TABLET ORAL EVERY 12 HOURS PRN
Status: DISCONTINUED | OUTPATIENT
Start: 2020-03-01 | End: 2020-03-04

## 2020-03-01 NOTE — ED INITIAL ASSESSMENT (HPI)
\"Pablo\" presents with several days of weakness, today feels like her heart is racing and is lightheaded. No hx of afib.  Pt reports right shoulder and leg pain

## 2020-03-01 NOTE — H&P
[facility] Hospitalist H&P       CC: Patient presents with:  Arrythmia/Palpitations       PCP: Yudith Christianson MD    History of Present Illness: Patient is a 80year old female with PMH sig for moderate aortic stenosis, HTN, OA, IBS, CKD stage 3, anxiety, who Disp: , Rfl:   VITAMIN-B COMPLEX OR, one tab daily, Disp: , Rfl:           Soc Hx  Social History    Tobacco Use      Smoking status: Never Smoker      Smokeless tobacco: Never Used    Alcohol use: No       Fam Hx  Family History   Problem Relation Age of LIPASE, LDH in the last 168 hours. Invalid input(s): ALPHOS, TBIL, DBIL, TPROT    Recent Labs   Lab 03/01/20  1139   TROP <0.045            Radiology: Xr Chest Ap Portable  (cpt=71045)    Result Date: 3/1/2020  CONCLUSION:  1. Borderline cardiomegaly.

## 2020-03-01 NOTE — ED NOTES
Dr. Walls [facility] at the bedside. Pt in Afib with RVR at 144, sat up for lung auscultation and converted back to NSR at rate of 85.

## 2020-03-01 NOTE — PROGRESS NOTES
[facility] Pharmacy Note:  Renal Dose Adjustment for Tramadol Zohreh Cavazos Elli has been prescribed Tramadol (ULTRAM) 50 mg orally every 8 hours as needed for pain. Estimated Creatinine Clearance: 26.1 mL/min (A) (based on SCr of 1.08 mg/dL (H)).

## 2020-03-01 NOTE — ED NOTES
Pt arrives via EMS from home. Silvano Holstein states that she has been feeling weak for one week. Reports subjective fevers, slight cough, and lethargy. Silvano Holstein states that she leaks urine frequently. No falls.  Silvano Holstein reports today she was at physical therapy and felt

## 2020-03-01 NOTE — ED PROVIDER NOTES
Patient Seen in: [facility] Emergency Department      History   Patient presents with:  Arrythmia/Palpitations    Stated Complaint: lightheaded, afib    HPI    51-year-old female with hypertension, aortic stenosis, presenting for evaluation of gen 154.9 cm (5' 1\")   Wt 50 kg   SpO2 95%   BMI 20.83 kg/m²         Physical Exam  Vitals signs and nursing note reviewed. Constitutional:       Appearance: She is well-developed. HENT:      Head: Normocephalic and atraumatic.       Mouth/Throat:      Emily within normal limits   PRO BETA NATRIURETIC PEPTIDE - Abnormal; Notable for the following components:    Pro-Beta Natriuretic Peptide 1,209 (*)     All other components within normal limits   CBC W/ DIFFERENTIAL - Abnormal; Notable for the following compon give bolus of diltiazem. Plan for admission. Spoke with Dr. Bayron Reilly who recommends diltiazem gtt, and Dr. Fide Cruz for admission.   Admission disposition: 3/1/2020  1:40 PM             Disposition and Plan     Clinical Impression:  Atrial fibrillation with rapid

## 2020-03-02 ENCOUNTER — APPOINTMENT (OUTPATIENT)
Dept: CV DIAGNOSTICS | Facility: HOSPITAL | Age: 85
DRG: 310 | End: 2020-03-02
Attending: INTERNAL MEDICINE
Payer: MEDICARE

## 2020-03-02 LAB
ANION GAP SERPL CALC-SCNC: 6 MMOL/L (ref 0–18)
BASOPHILS # BLD AUTO: 0.01 X10(3) UL (ref 0–0.2)
BASOPHILS NFR BLD AUTO: 0.1 %
BUN BLD-MCNC: 34 MG/DL (ref 7–18)
BUN/CREAT SERPL: 29.3 (ref 10–20)
CALCIUM BLD-MCNC: 8.7 MG/DL (ref 8.5–10.1)
CHLORIDE SERPL-SCNC: 101 MMOL/L (ref 98–112)
CO2 SERPL-SCNC: 29 MMOL/L (ref 21–32)
CREAT BLD-MCNC: 1.16 MG/DL (ref 0.55–1.02)
DEPRECATED RDW RBC AUTO: 42.2 FL (ref 35.1–46.3)
EOSINOPHIL # BLD AUTO: 0.04 X10(3) UL (ref 0–0.7)
EOSINOPHIL NFR BLD AUTO: 0.6 %
ERYTHROCYTE [DISTWIDTH] IN BLOOD BY AUTOMATED COUNT: 12.3 % (ref 11–15)
GLUCOSE BLD-MCNC: 103 MG/DL (ref 70–99)
HAV IGM SER QL: 1.9 MG/DL (ref 1.6–2.6)
HCT VFR BLD AUTO: 30.3 % (ref 35–48)
HGB BLD-MCNC: 10 G/DL (ref 12–16)
IMM GRANULOCYTES # BLD AUTO: 0.02 X10(3) UL (ref 0–1)
IMM GRANULOCYTES NFR BLD: 0.3 %
LYMPHOCYTES # BLD AUTO: 0.95 X10(3) UL (ref 1–4)
LYMPHOCYTES NFR BLD AUTO: 13.1 %
MCH RBC QN AUTO: 30.7 PG (ref 26–34)
MCHC RBC AUTO-ENTMCNC: 33 G/DL (ref 31–37)
MCV RBC AUTO: 92.9 FL (ref 80–100)
MONOCYTES # BLD AUTO: 1.41 X10(3) UL (ref 0.1–1)
MONOCYTES NFR BLD AUTO: 19.5 %
NEUTROPHILS # BLD AUTO: 4.8 X10 (3) UL (ref 1.5–7.7)
NEUTROPHILS # BLD AUTO: 4.8 X10(3) UL (ref 1.5–7.7)
NEUTROPHILS NFR BLD AUTO: 66.4 %
OSMOLALITY SERPL CALC.SUM OF ELEC: 290 MOSM/KG (ref 275–295)
PLATELET # BLD AUTO: 171 10(3)UL (ref 150–450)
POTASSIUM SERPL-SCNC: 3.1 MMOL/L (ref 3.5–5.1)
POTASSIUM SERPL-SCNC: 4.4 MMOL/L (ref 3.5–5.1)
RBC # BLD AUTO: 3.26 X10(6)UL (ref 3.8–5.3)
SODIUM SERPL-SCNC: 136 MMOL/L (ref 136–145)
WBC # BLD AUTO: 7.2 X10(3) UL (ref 4–11)

## 2020-03-02 PROCEDURE — 83735 ASSAY OF MAGNESIUM: CPT | Performed by: HOSPITALIST

## 2020-03-02 PROCEDURE — 93306 TTE W/DOPPLER COMPLETE: CPT | Performed by: INTERNAL MEDICINE

## 2020-03-02 PROCEDURE — 84132 ASSAY OF SERUM POTASSIUM: CPT | Performed by: HOSPITALIST

## 2020-03-02 PROCEDURE — 80048 BASIC METABOLIC PNL TOTAL CA: CPT | Performed by: HOSPITALIST

## 2020-03-02 PROCEDURE — 85025 COMPLETE CBC W/AUTO DIFF WBC: CPT | Performed by: HOSPITALIST

## 2020-03-02 RX ORDER — POTASSIUM CHLORIDE 20 MEQ/1
40 TABLET, EXTENDED RELEASE ORAL EVERY 4 HOURS
Status: COMPLETED | OUTPATIENT
Start: 2020-03-02 | End: 2020-03-02

## 2020-03-02 NOTE — PLAN OF CARE
Patient is a/ox4. RA. Up with assist x1; uses a walking stick at home. Call light within reach. Tramadol for pain PRN. Incontinent, purewick in place. Respiratory panel negative; droplet iso d/c'd. Cardizem drip d/c'd, PO metoprolol started.  Plan is for an care, etc)  - Arrange for interpreters to assist at discharge as needed  - Consider post-discharge preferences of patient/family/discharge partner  - Complete POLST form as appropriate  - Assess patient's ability to be responsible for managing their own he

## 2020-03-02 NOTE — PLAN OF CARE
A/O x4. RA. SBA w/ walking stick at home-- hasn't gotten up yet. Ice given for arthritic RUE px. Purewick in place. Pt asleep in bed w/ call light within reach. Plan: Eliquis. 2D echo today.     Problem: Patient Centered Care  Goal: Patient preferences are unsuccessful or patient reports new pain  - Anticipate increased pain with activity and pre-medicate as appropriate  Outcome: Progressing     Problem: DISCHARGE PLANNING  Goal: Discharge to home or other facility with appropriate resources  Description  IN

## 2020-03-02 NOTE — CM/SW NOTE
SW received MDO for Advanced Directives. SW met w/ pt in her room. Pt verified her address and confirmed living w/ her . They live in a 2 level home that has approx 14 stairs between levels and 3 steps into the home.     Pt reports using a \"walki

## 2020-03-02 NOTE — PROGRESS NOTES
[facility] Hospitalist Progress Note     CC: Hospital Follow up    PCP: Cyril Stanley MD       Assessment/Plan:     Principal Problem:    Atrial fibrillation with rapid ventricular response [facility])    Patient is a 80year old female with PMH sig for moderate ao (36.8 °C)-98.6 °F (37 °C)] 98.5 °F (36.9 °C)  Pulse:  [] 67  Resp:  [16-24] 16  BP: (102-156)/() 102/86      Intake/Output:    Intake/Output Summary (Last 24 hours) at 3/2/2020 1249  Last data filed at 3/2/2020 1143  Gross per 24 hour   Intake mg Oral BID     • diltiazem Stopped (03/01/20 0612)     traMADol HCl, Normal Saline Flush, acetaminophen, ondansetron HCl

## 2020-03-02 NOTE — PROGRESS NOTES
[facility] HOSP - [facility]    Cardiology Progress Note    Stanislav Calvo Patient Status:  Inpatient    1930 MRN [id number]   Location [facility] 3W/SW Attending Tommy Rubio MD   Hosp Day # 1 PCP Sulaiman Morrow MD       Impression/Plan:  99 adenopathy. Neck: No JVD, carotids 2+, no bruits. Cardiac: Regular rate and rhythm. S1, S2 normal. 3/6 mid peaking systolic murmur RUSB  Lungs: Clear without wheezes, rales, rhonchi or dullness. Normal excursions and effort. Abdomen: Soft, non-tender.

## 2020-03-03 PROCEDURE — 97530 THERAPEUTIC ACTIVITIES: CPT

## 2020-03-03 PROCEDURE — 97165 OT EVAL LOW COMPLEX 30 MIN: CPT

## 2020-03-03 PROCEDURE — 97162 PT EVAL MOD COMPLEX 30 MIN: CPT

## 2020-03-03 PROCEDURE — 97116 GAIT TRAINING THERAPY: CPT

## 2020-03-03 NOTE — PHYSICAL THERAPY NOTE
PHYSICAL THERAPY EVALUATION - INPATIENT     Room Number: 320/320-A  Evaluation Date: 3/3/2020  Type of Evaluation: Initial   Physician Order: PT Eval and Treat    Presenting Problem: afib with rapid ventricular response  Reason for Therapy: Mobility Dysfu Treatment Plan: Bed mobility; Body mechanics; Patient education;Gait training;Transfer training;Balance training;Stair training  Rehab Potential : Good  Frequency (Obs): 5x/week       PHYSICAL THERAPY MEDICAL/SOCIAL HISTORY   Problem List  Principal Problem: WNL    BALANCE  Static Sitting: Good  Dynamic Sitting: Good  Static Standing: Fair +  Dynamic Standin Lyla May 2 '6-Clicks' INPATIENT SHORT FORM - BASIC MOBILITY  How much difficulty does the patient currently have. ..  -   Turning over in bed (incl Goal #4   Current Status    Goal #5 Patient to demonstrate independence with home activity/exercise instructions provided to patient in preparation for discharge.    Goal #5   Current Status    Goal #6    Goal #6  Current Status

## 2020-03-03 NOTE — PROGRESS NOTES
[facility] Hospitalist Progress Note     CC: Hospital Follow up    PCP: Berny Mckeon MD       Assessment/Plan:     Principal Problem:    Atrial fibrillation with rapid ventricular response [facility])    Patient is a 80year old female with PMH sig for moderate ao temperature source Oral, resp. rate 15, height 5' 1\" (1.549 m), weight 107 lb 9.6 oz (48.8 kg), SpO2 95 %.     Temp:  [97.5 °F (36.4 °C)-98.4 °F (36.9 °C)] 98 °F (36.7 °C)  Pulse:  [62-70] 62  Resp:  [15-18] 15  BP: (119-148)/(49-63) 148/62      Intake/Out MD on 3/01/2020 at 12:06     Approved by (CST): Kishan Mayer MD on 3/01/2020 at 12:11              Meds:     • metoprolol Tartrate  25 mg Oral 2x Daily(Beta Blocker)   • apixaban  2.5 mg Oral BID     • diltiazem Stopped (03/01/20 0408)     traMADol

## 2020-03-03 NOTE — PROGRESS NOTES
[facility]    Cardiology Progress Note    Duane Seller Patient Status:  Inpatient    1930 MRN [id number]   Location [facility] 3W/SW Attending Gissel Cordova MD   Hosp Day # 2 PCP Yudith Christianson MD       Impression/Plan: apparent distress. No respiratory or constitutional distress. HEENT: Normocephalic, anicteric sclera, neck supple, no thyromegaly or adenopathy. Neck: No JVD, carotids 2+, no bruits. Cardiac: Regular rate and rhythm.  S1, S2 normal. 3/6 mid peaking systo

## 2020-03-03 NOTE — OCCUPATIONAL THERAPY NOTE
OCCUPATIONAL THERAPY EVALUATION - INPATIENT     Room Number: 320/320-A  Evaluation Date: 3/3/2020  Type of Evaluation: Initial  Presenting Problem: (afib;weakness)    Physician Order: IP Consult to Occupational Therapy  Reason for Therapy: ADL/IADL Dysfunc Research supports that patients with this level of impairment may benefit from return to home w/ HH. DISCHARGE RECOMMENDATIONS  OT Discharge Recommendations: Home;Outpatient PT; Intermittent Supervision  OT Device Recommendations: None    PLAN  OT Treat had been receiving OP PT for her arm pain and balance issues    SUBJECTIVE  \"I feel so much better\"    OCCUPATIONAL THERAPY EXAMINATION      OBJECTIVE  Precautions: None  Fall Risk: Standard fall risk    PAIN ASSESSMENT  Ratin  Location: (bilateral a chair;Needs met;Call light within reach;RN aware of session/findings; All patient questions and concerns addressed    OT Goals  Patients self stated goal is: to return home and resume OP PT     Patient will complete functional transfer with mod i  Comment:

## 2020-03-03 NOTE — DIETARY NOTE
ADULT NUTRITION INITIAL ASSESSMENT    Pt is at moderate nutrition risk. Pt meets moderate malnutrition criteria.       CRITERIA FOR MALNUTRITION DIAGNOSIS:  Criteria for non-severe malnutrition diagnosis: acute illness/injury related to wt loss 1-2% in 1 w bowel syndrome), Osteoarthritis, OSTEOPENIA, and Unspecified essential hypertension. ANTHROPOMETRICS:  HT: 154.9 cm (5' 1\")  WT: 48.8 kg (107 lb 9.6 oz)   BMI: Body mass index is 20.33 kg/m².   BMI CLASSIFICATION: 19-24.9 kg/m2 - WNL  Underweight in old Nutrition  Ext [phone]

## 2020-03-03 NOTE — PLAN OF CARE
A/O x4. RA. Pt remained in NSR throughout the night. Tramadol adminstered for BUE px. Baseline SBA w/ walking stick at home. Ambulated w/ walker during change of shift. Unsteady gain. Plan: Work w/ PT/OT tomorrow.  Back home w/  upon d/c pending PT/O techniques  - Monitor for opioid side effects  - Notify MD/LIP if interventions unsuccessful or patient reports new pain  - Anticipate increased pain with activity and pre-medicate as appropriate  Outcome: Progressing     Problem: DISCHARGE PLANNING  Goal:

## 2020-03-04 ENCOUNTER — PATIENT OUTREACH (OUTPATIENT)
Dept: CASE MANAGEMENT | Age: 85
End: 2020-03-04

## 2020-03-04 VITALS
HEART RATE: 74 BPM | WEIGHT: 104.5 LBS | HEIGHT: 61 IN | OXYGEN SATURATION: 96 % | SYSTOLIC BLOOD PRESSURE: 156 MMHG | RESPIRATION RATE: 18 BRPM | BODY MASS INDEX: 19.73 KG/M2 | DIASTOLIC BLOOD PRESSURE: 78 MMHG | TEMPERATURE: 98 F

## 2020-03-04 PROCEDURE — 97116 GAIT TRAINING THERAPY: CPT

## 2020-03-04 PROCEDURE — 97110 THERAPEUTIC EXERCISES: CPT

## 2020-03-04 NOTE — PHYSICAL THERAPY NOTE
PHYSICAL THERAPY TREATMENT NOTE - INPATIENT     Room Number: 320/320-A       Presenting Problem: afib with rapid ventricular response    Problem List  Principal Problem:    Atrial fibrillation with rapid ventricular response (HCC)      PHYSICAL THERAPY ASS 156/78  BP Location: Left arm  BP Method: Automatic  Patient Position: Sitting    O2 WALK                  AM-PAC '6-Clicks' INPATIENT SHORT FORM - BASIC MOBILITY  How much difficulty does the patient currently have. ..  -   Turning over in bed (including a Status 200 ft and 300 ft with RW with CGA   Goal #4 Patient will negotiate 4 stairs/one curb w/ assistive device and supervision   Goal #4   Current Status 4 stairs with 1 HR with Min A   Goal #5 Patient to demonstrate independence with home activity/exerc

## 2020-03-04 NOTE — CM/SW NOTE
11: 30AM  Per RN rounds and chart review: recommend [facility] services for pt. Pt would benefit from these. Per SW's conversation w/ pt on 3/2 - pt is open to [facility] services. SW had provided pt w/ list and right to choose.  Pt did not express a preference in agency

## 2020-03-04 NOTE — HOME CARE LIAISON
Received referral from [address] [place] [address]. Attempted to meet with patient, however patient changed her mind regarding home health and would prefer to go to outpatient therapy.

## 2020-03-04 NOTE — PROGRESS NOTES
[facility]    Cardiology Progress Note    Reggie Roup Patient Status:  Inpatient    1930 MRN [id number]   Location [facility] 3W/SW Attending Mayra Smith MD   Hosp Day # 3 PCP Ruslan Rogers MD       Impression/Plan: respiratory or constitutional distress. HEENT: Normocephalic, anicteric sclera, neck supple, no thyromegaly or adenopathy. Neck: No JVD, carotids 2+, no bruits. Cardiac: Regular rate and rhythm.  S1, S2 normal. 2/6 mid peaking systolic murmur RUSB  Lungs

## 2020-03-04 NOTE — PLAN OF CARE
Patient discharged on 3/4 at 4:45 pm. Patient alert and oriented. Belongings gathered and sent with patient. Discharge instructions given. Prescriptions given to patient. Tele monitor and IV removed. Patient discharged home.

## 2020-03-04 NOTE — PLAN OF CARE
Problem: Patient Centered Care  Goal: Patient preferences are identified and integrated in the patient's plan of care  Description  Interventions:  - What would you like us to know as we care for you?  Hx of arthritis and px  - Provide timely, complete, a other facility with appropriate resources  Description  INTERVENTIONS:  - Identify barriers to discharge w/pt and caregiver  - Include patient/family/discharge partner in discharge planning  - Arrange for needed discharge resources and transportation as ap

## 2020-03-05 ENCOUNTER — PATIENT OUTREACH (OUTPATIENT)
Dept: CASE MANAGEMENT | Age: 85
End: 2020-03-05

## 2020-03-08 NOTE — DISCHARGE SUMMARY
General Medicine Discharge Summary     Patient ID:  Nisreen Moncada  [address] old  2/6/1930    Admit date: 3/1/2020    Discharge date and time: 3/4/2020  4:57 PM     Attending Physician: Renita Gutierrez WORK    Operative Procedures:        Patient instructions:      n/a      Exam on day of discharge:      03/04/20  1300   BP: 156/78   Pulse: 74   Resp: 18   Temp:        Gen: No acute distress, appears stated age  Heent: NC AT  Pulm: Lungs clear, no increa

## 2020-03-10 ENCOUNTER — OFFICE VISIT (OUTPATIENT)
Dept: CARDIOLOGY CLINIC | Facility: HOSPITAL | Age: 85
End: 2020-03-10
Attending: INTERNAL MEDICINE
Payer: MEDICARE

## 2020-03-10 VITALS
SYSTOLIC BLOOD PRESSURE: 148 MMHG | OXYGEN SATURATION: 100 % | DIASTOLIC BLOOD PRESSURE: 64 MMHG | HEART RATE: 58 BPM | BODY MASS INDEX: 20 KG/M2 | WEIGHT: 106 LBS

## 2020-03-10 DIAGNOSIS — I48.91 ATRIAL FIBRILLATION (HCC): ICD-10-CM

## 2020-03-10 DIAGNOSIS — I48.0 PAROXYSMAL ATRIAL FIBRILLATION (HCC): Primary | ICD-10-CM

## 2020-03-10 LAB
DEPRECATED RDW RBC AUTO: 40.5 FL (ref 35.1–46.3)
ERYTHROCYTE [DISTWIDTH] IN BLOOD BY AUTOMATED COUNT: 11.8 % (ref 11–15)
HCT VFR BLD AUTO: 37 % (ref 35–48)
HGB BLD-MCNC: 12.1 G/DL (ref 12–16)
MCH RBC QN AUTO: 30.6 PG (ref 26–34)
MCHC RBC AUTO-ENTMCNC: 32.7 G/DL (ref 31–37)
MCV RBC AUTO: 93.7 FL (ref 80–100)
PLATELET # BLD AUTO: 274 10(3)UL (ref 150–450)
RBC # BLD AUTO: 3.95 X10(6)UL (ref 3.8–5.3)
WBC # BLD AUTO: 4.2 X10(3) UL (ref 4–11)

## 2020-03-10 PROCEDURE — 99212 OFFICE O/P EST SF 10 MIN: CPT | Performed by: CLINICAL NURSE SPECIALIST

## 2020-03-10 PROCEDURE — 99214 OFFICE O/P EST MOD 30 MIN: CPT | Performed by: CLINICAL NURSE SPECIALIST

## 2020-03-10 PROCEDURE — 36415 COLL VENOUS BLD VENIPUNCTURE: CPT | Performed by: CLINICAL NURSE SPECIALIST

## 2020-03-10 PROCEDURE — 85027 COMPLETE CBC AUTOMATED: CPT | Performed by: CLINICAL NURSE SPECIALIST

## 2020-03-10 NOTE — PROGRESS NOTES
[address] Patient Status:  Outpatient    1930 MRN [id number]   Location [facility], MD Dr Durenda Rickers is a 80year old female who presents to clinic for a LIP 41 04/19/2017 04:57 PM    DDIMER 0.50 09/26/2017 05:10 PM    ESRML 19 06/17/2019 02:11 PM    CRP 3.00 02/05/2018 10:45 AM    MG 1.9 03/02/2020 05:21 AM    PHOS 4.0 06/17/2019 02:11 PM    TROP <0.045 03/01/2020 11:39 AM    B12 377 04/03/2018 10:48 AM list) and having a \"dark stool today\" which is not normal for her; CBC stable. Hgb 12.   - Follow up in [address] [place] [address] as needed  - Appointment with Dr Friedman in 2-3 weeks.         Plan:  Continue current medications    Please start monitoring your

## 2020-03-10 NOTE — PATIENT INSTRUCTIONS
Continue current medications    Please start monitoring your heart rate and blood pressure at home.      Call the [address] with changes in heart rate (too slow, less than 55 or too fast, more than 90), shortness of breath, palpitations or fatig

## 2020-04-08 PROBLEM — E87.6 HYPOKALEMIA: Status: ACTIVE | Noted: 2020-04-08

## 2020-04-08 PROBLEM — E03.9 HYPOTHYROIDISM, UNSPECIFIED TYPE: Status: ACTIVE | Noted: 2020-04-08

## 2020-06-10 ENCOUNTER — TELEPHONE (OUTPATIENT)
Dept: CARDIOLOGY CLINIC | Facility: HOSPITAL | Age: 85
End: 2020-06-10

## 2020-06-10 NOTE — TELEPHONE ENCOUNTER
Pt called and she felt lethargic and jittery and her heart rate was erratic last night. She had a little lightheadedness but denied shortness of breath chest pain. Blood pressure was 132/73 and heart rate was 116.   She continues to take Eliquis and dilti

## 2020-08-04 PROBLEM — F32.A FATIGUE DUE TO DEPRESSION: Status: ACTIVE | Noted: 2020-01-01

## 2020-08-04 PROBLEM — R53.83 FATIGUE DUE TO DEPRESSION: Status: ACTIVE | Noted: 2020-01-01

## 2020-09-16 PROBLEM — R53.83 FATIGUE, UNSPECIFIED TYPE: Status: ACTIVE | Noted: 2020-01-01

## 2020-09-16 PROBLEM — D50.8 IRON DEFICIENCY ANEMIA SECONDARY TO INADEQUATE DIETARY IRON INTAKE: Status: RESOLVED | Noted: 2017-09-27 | Resolved: 2020-01-01

## 2020-10-05 PROBLEM — M25.061 HEMARTHROSIS, RIGHT KNEE: Status: ACTIVE | Noted: 2020-01-01

## 2020-10-05 NOTE — ED INITIAL ASSESSMENT (HPI)
Right knee pain on and off for several weeks/months. C/o increasing pain to knee without trauma since Thursday.  +swelling to knee

## 2020-10-05 NOTE — ED NOTES
Pt c/o increased pain to knee after toradol injection. Dr. Filemon Cerna notified and will given additional dose of morphine.

## 2020-10-05 NOTE — ED PROVIDER NOTES
Patient Seen in: [facility] Emergency Department    History   Patient presents with:  Knee Pain    Stated Complaint: right knee pains    HPI    HPI: Alda Solis is a 80year old female who presents with r knee pain and swelling getting progres Cancer Mother         Breast Cancer 71   • Breast Cancer Mother 64   • Heart Disease Mother         aortic stenosis   • Cancer Maternal Grandmother         breast   • Breast Cancer Maternal Grandmother         in her late 76s   • Cancer Other         Dina Romano CRYSTAL SYNOVIAL. Procedure                               Abnormality         Status                     ---------                               -----------         ------                     CRYSTAL EXAM,ELIZA. Isiah Aden [[id number]]                      In Prescribed:  Current Discharge Medication List    START taking these medications    Acetaminophen-Codeine #3 300-30 MG Oral Tab  Take 1 tablet by mouth every 8 (eight) hours as needed for Pain.   Qty: 15 tablet Refills: 0

## 2020-10-05 NOTE — ED NOTES
Report given to [facility]. Orders to hold eliquis x 7 days relayed in reports and understanding verbalized by [facility]. Awaiting transport.

## 2020-10-05 NOTE — CM/SW NOTE
Cm met with patient and  at bedside    Patient unable to ambulate, 2 person max assist    Patient agreeable to rehab placement    Referrals sent in [email or web address] able to accept, patient agreeable     Rn to Rn report [phone] am

## 2020-10-06 NOTE — PHYSICAL THERAPY NOTE
PHYSICAL THERAPY EVALUATION - INPATIENT     Room Number: 408/408-A  Evaluation Date: 10/6/2020  Type of Evaluation: Initial   Physician Order: PT Eval and Treat    Presenting Problem: R knee pain due to hemarthrosis  Reason for Therapy: Mobility Dysfuncti and is unable to ambulate due to R knee pain. DISCHARGE RECOMMENDATIONS  PT Discharge Recommendations: Sub-acute rehabilitation    PLAN  PT Treatment Plan: Bed mobility; Endurance; Patient education;Gait training;Strengthening;Stair training;Transfer t PHYSICAL THERAPY EXAMINATION     OBJECTIVE  Precautions: Limb alert - right  Fall Risk: High fall risk    WEIGHT BEARING RESTRICTION  Weight Bearing Restriction: R lower extremity        R Lower Extremity: Weight Bearing as Tolerated       PAIN ASSESSM A    Transfers:  Mod A Of 2    Exercise/Education Provided:  Bed mobility  Functional activity tolerated  Gait training  Strengthening  Transfer training    Patient End of Session: Up in chair;Needs met;Call light within reach;RN aware of session/findings;A

## 2020-10-06 NOTE — ED NOTES
Pt c/o increasing pain despite continued narcotic analgesia. Dr. Ronda Philip at bedside to reassess. Per Dr. Ronda Philip pt will require admission for further pain management. Family aware of plan.

## 2020-10-06 NOTE — PLAN OF CARE
Problem: PAIN - ADULT  Goal: Verbalizes/displays adequate comfort level or patient's stated pain goal  Description: INTERVENTIONS:  - Encourage pt to monitor pain and request assistance  - Assess pain using appropriate pain scale  - Administer analgesics and document dressing/incision, wound bed, drain sites and surrounding tissue  - Implement wound care per orders  - Initiate isolation precautions as appropriate  - Initiate Pressure Ulcer prevention bundle as indicated  Outcome: Adequate for Discharge Medicar    Report given to Armando at [facility] at 12:40 pm

## 2020-10-06 NOTE — PLAN OF CARE
Problem: Patient Centered Care  Goal: Patient preferences are identified and integrated in the patient's plan of care  Description: Interventions:  - What would you like us to know as we care for you?   - Provide timely, complete, and accurate informatio falls.  - [place] fall precautions as indicated by assessment.  - Educate pt/family on patient safety including physical limitations  - Instruct pt to call for assistance with activity based on assessment  - Modify environment to reduce risk of injury  -

## 2020-10-06 NOTE — H&P
[facility] Hospitalist H&P       CC: Patient presents with:  Knee Pain       PCP: Sean Roblero MD    Date of Admission: 10/5/2020  2:07 PM    ASSESSMENT / PLAN:       Ms. Remedios Caro is a 79 yo F with PMH of Afib on Eliquis, HTN, AS who presented with knee pain History   • Osteoarthritis    • OSTEOPENIA    • Unspecified essential hypertension         PSH  Past Surgical History:   Procedure Laterality Date   • APPENDECTOMY     • APPENDECTOMY     • COLONOSCOPY     • HYSTERECTOMY      JOY - Dr. Tiburcio Berrios   • [address] Grandmother         breast   • Breast Cancer Maternal Grandmother         in her late 76s   • Cancer Other         breast   • Breast Cancer Other         m  aunt unsure of age   • Cancer Sister         breast   • Breast Cancer Sister 76       Review of Sys

## 2020-10-06 NOTE — CM/SW NOTE
SANDRA spoke with RN/ MD and self referred to patients chart due to previous nights attempt to DC to [facility]. SANDRA spoke with Cem Maurice at [facility] who accepted pt and is able to accept the patient today.      SANDRA spoke with patient at bedside who states sh

## 2020-10-06 NOTE — PROGRESS NOTES
HPI: Nisreen Moncada  Is a 81 yo who presented to to [address] [place] [address] ER with right knee pain and swelling getting progressively worse over the past couple of weeks. R knee with effusion.  Xray showed tricompartmental osteoarthritis, most pronounced in the lateral jimmy ANAPHYLAXIS  Iodine I 131 Tositu*    Dyspnea  Penicillin [Penicil*        CODE STATUS: DNR  reviewed    CURRENT MEDICATIONS: Reviewed on SNF EMR  VITALS: Reviewed   LABS/Imaging: Reviewed     SUBJECTIVE/REVIEW OF SYSTEMS:  Seen in room upon admission.  Patricia Hoff

## 2020-10-06 NOTE — ED NOTES
Orders for admission, patient is aware of plan and ready to go upstairs. Any questions, please call ED ANTONIA Lees  at extension [phone].    Type of COVID test sent:  Rapid  COVID Suspicion level: Low    Titratable drug(s) infusing:  Rate:    LOC at time of trans

## 2020-10-06 NOTE — CONSULTS
[facility]  Orthopedic Surgery  Report of Consultation    Senora Friend Patient Status:  Observation    1930 MRN [id number]   Location [facility] 4W/SW/SE Attending No att. providers found   [facility] Day # 0 PCP Farzana Zabala MD WIRE 1 SITE RIGHT (CPT=[id number])     • OTHER SURGICAL HISTORY      benign breast tumors     Family History   Problem Relation Age of Onset   • Cancer Mother         Breast Cancer 71   • Breast Cancer Mother 64   • Heart Disease Mother         aortic stenosis 1 tablet by mouth daily. , Start Date , End Date , Taking? Yes, Authorizing Provider External/Patient, Reported    Medication Calcium Carbonate-Vitamin D (CALCIUM-VITAMIN D) 500-200 MG-UNIT Oral Tab, Sig Take 1 tablet by mouth daily. , Start Date , End Date 1,216  Crystals - None  Gram stain - no organisms seen      Impression: 89F on Eliquis for atrial fibrillation with a right knee hemarthrosis.   - hold anticoagulants per cardiology recs  - ice, gentle compressive ACE wrap right knee  - analgesia prn  - f/u

## 2020-10-06 NOTE — OCCUPATIONAL THERAPY NOTE
OCCUPATIONAL THERAPY EVALUATION - INPATIENT      Room Number: 408/408-A  Evaluation Date: 10/6/2020  Type of Evaluation: Initial  Presenting Problem: (hemiarthrosis of R knee)    Physician Order: IP Consult to Occupational Therapy  Reason for Therapy: ADL/ eval/education; Compensatory technique education       OCCUPATIONAL THERAPY MEDICAL/SOCIAL HISTORY     Problem List   Principal Problem:    Hemarthrosis, right knee      Past Medical History  Past Medical History:   Diagnosis Date   • Aortic stenosis    • A Status:  WFL - within functional limits    RANGE OF MOTION   Upper extremity ROM is within functional limits - re[prts arthritis in B shoulders, sometimes requires assist with dressing tasks.     STRENGTH ASSESSMENT  Upper extremity strength is within funct will complete LE dressing with min a using LHAE  Comment:     Patient will complete toilet transfer with min a   Comment:     Patient will complete self care task at sink level with min a    Comment:             Goals  on: 10/16  Frequency: 3x week

## 2020-10-07 NOTE — DISCHARGE SUMMARY
General Medicine Discharge Summary     Patient ID:  Shahram Herrera  80year old  2/6/1930    Admit date: 10/5/2020    Discharge date and time: 10/06/20    Attending Physician: No att. providers found     Primary Care Physician: Bimal Marinelli MD     Re hours as needed. docusate sodium 100 MG Caps  Commonly known as: COLACE  Take 100 mg by mouth 2 (two) times daily. HYDROcodone-acetaminophen 5-325 MG Tabs  Commonly known as: NORCO  Take 1 tablet by mouth every 4 (four) hours as needed.         Alma Caro Friday          Follow-up with labs: BMP on Friday    Total Time Coordinating Care: Greater than 30 minutes    Patient had opportunity to ask questions and state understand and agree with therapeutic plan as outlined      Nikos Abel MD  [facility]

## 2020-10-09 NOTE — PROGRESS NOTES
HPI: Farhat Bergman  Is a 81 yo who presented to to [facility] ER with right knee pain and swelling getting progressively worse over the past couple of weeks. R knee with effusion.  Xray showed tricompartmental osteoarthritis, most pronounced in the lateral jimmy edema   SKIN: warm, dry  NEUROLOGIC: intact; no sensorimotor deficit, follows commands  PSYCHIATRIC: alert and oriented x 3; affect appropriate      ASSESSMENT/PLAN  Cont PT/OT estimated DC to home 10/23  No BM in 5 days- cont colace bid, add miralax qd, m

## 2020-10-12 NOTE — PROGRESS NOTES
HPI: Felicia Pallas  Is a 81 yo who presented to to [address] [place] [address] ER with right knee pain and swelling getting progressively worse over the past couple of weeks. R knee with effusion.  Xray showed tricompartmental osteoarthritis, most pronounced in the lateral jimmy RESPIRATORY:CTAB  CARDIOVASCULAR: S1S2 RRR +murmur  ABDOMEN:  normal active BS+, soft, non distended, nontender   MUSCULOSKELETAL/EXTREMITIES: no edema   SKIN: warm, dry  NEUROLOGIC: intact; no sensorimotor deficit, follows commands  PSYCHIATRIC: alert a

## 2020-10-14 NOTE — PROGRESS NOTES
HPI: Mitchel Jimenez  Is a 79 yo who presented to to [address] [place] [address] ER with right knee pain and swelling getting progressively worse over the past couple of weeks. R knee with effusion.  Xray showed tricompartmental osteoarthritis, most pronounced in the lateral jimmy MUSCULOSKELETAL/EXTREMITIES: no edema   SKIN: warm, dry  NEUROLOGIC: intact; no sensorimotor deficit, follows commands  PSYCHIATRIC: alert and oriented x 3; affect appropriate      ASSESSMENT/PLAN  Cont PT/OT estimated DC to home 10/23  Stable          R

## 2020-10-21 NOTE — PROGRESS NOTES
HPI: Reggie Farrar  Is a 81 yo who presented to to [address] [place] [address] ER with right knee pain and swelling getting progressively worse over the past couple of weeks. R knee with effusion.  Xray showed tricompartmental osteoarthritis, most pronounced in the lateral jimmy MUSCULOSKELETAL/EXTREMITIES:LLE trace edema, RLE 1+edema   SKIN: warm, dry  NEUROLOGIC: intact; no sensorimotor deficit, follows commands  PSYCHIATRIC: alert and oriented x 3; affect appropriate      ASSESSMENT/PLAN  LLE edema- give 1 dose of po lasix 10

## 2020-11-14 PROBLEM — R60.9 PERIPHERAL EDEMA: Status: ACTIVE | Noted: 2020-01-01

## 2020-11-14 PROBLEM — M25.061 HEMARTHROSIS OF RIGHT KNEE: Status: ACTIVE | Noted: 2020-01-01

## 2021-01-01 ENCOUNTER — SNF VISIT (OUTPATIENT)
Dept: INTERNAL MEDICINE CLINIC | Facility: SKILLED NURSING FACILITY | Age: 86
End: 2021-01-01

## 2021-01-01 ENCOUNTER — HOSPITAL ENCOUNTER (OUTPATIENT)
Dept: INTERVENTIONAL RADIOLOGY/VASCULAR | Facility: HOSPITAL | Age: 86
Discharge: HOME | End: 2021-01-01
Attending: INTERNAL MEDICINE | Admitting: INTERNAL MEDICINE
Payer: MEDICARE

## 2021-01-01 ENCOUNTER — SNF ADMIT/H&P (OUTPATIENT)
Dept: INTERNAL MEDICINE CLINIC | Facility: SKILLED NURSING FACILITY | Age: 86
End: 2021-01-01

## 2021-01-01 ENCOUNTER — APPOINTMENT (OUTPATIENT)
Dept: GENERAL RADIOLOGY | Facility: HOSPITAL | Age: 86
End: 2021-01-01
Attending: EMERGENCY MEDICINE
Payer: MEDICARE

## 2021-01-01 ENCOUNTER — NURSE ONLY (OUTPATIENT)
Dept: LAB | Facility: HOSPITAL | Age: 86
End: 2021-01-01
Attending: INTERNAL MEDICINE
Payer: MEDICARE

## 2021-01-01 ENCOUNTER — HOSPITAL ENCOUNTER (OUTPATIENT)
Facility: HOSPITAL | Age: 86
Setting detail: OBSERVATION
Discharge: NURSING, REHAB OR LONG-TERM CARE | End: 2021-01-01
Attending: EMERGENCY MEDICINE | Admitting: INTERNAL MEDICINE
Payer: MEDICARE

## 2021-01-01 ENCOUNTER — SNF DISCHARGE (OUTPATIENT)
Dept: INTERNAL MEDICINE CLINIC | Facility: SKILLED NURSING FACILITY | Age: 86
End: 2021-01-01

## 2021-01-01 VITALS
OXYGEN SATURATION: 99 % | BODY MASS INDEX: 19 KG/M2 | DIASTOLIC BLOOD PRESSURE: 61 MMHG | HEART RATE: 99 BPM | SYSTOLIC BLOOD PRESSURE: 121 MMHG | RESPIRATION RATE: 19 BRPM | WEIGHT: 102 LBS

## 2021-01-01 VITALS
WEIGHT: 107.31 LBS | RESPIRATION RATE: 20 BRPM | SYSTOLIC BLOOD PRESSURE: 103 MMHG | HEIGHT: 62 IN | TEMPERATURE: 98 F | HEART RATE: 88 BPM | OXYGEN SATURATION: 97 % | BODY MASS INDEX: 19.75 KG/M2 | DIASTOLIC BLOOD PRESSURE: 44 MMHG

## 2021-01-01 DIAGNOSIS — I48.91 ATRIAL FIBRILLATION, UNSPECIFIED TYPE (HCC): ICD-10-CM

## 2021-01-01 DIAGNOSIS — I35.0 NONRHEUMATIC AORTIC (VALVE) STENOSIS: ICD-10-CM

## 2021-01-01 DIAGNOSIS — M25.061 HEMARTHROSIS OF RIGHT KNEE: ICD-10-CM

## 2021-01-01 DIAGNOSIS — N18.9 CHRONIC KIDNEY DISEASE, UNSPECIFIED CKD STAGE: ICD-10-CM

## 2021-01-01 DIAGNOSIS — I48.0 PAROXYSMAL ATRIAL FIBRILLATION (HCC): ICD-10-CM

## 2021-01-01 DIAGNOSIS — M25.061 HEMARTHROSIS OF RIGHT KNEE: Primary | ICD-10-CM

## 2021-01-01 DIAGNOSIS — I10 ESSENTIAL HYPERTENSION: ICD-10-CM

## 2021-01-01 DIAGNOSIS — Z01.818 PRE-OP TESTING: Primary | ICD-10-CM

## 2021-01-01 DIAGNOSIS — M25.461 EFFUSION OF KNEE JOINT RIGHT: ICD-10-CM

## 2021-01-01 DIAGNOSIS — Z01.818 PRE-OP TESTING: ICD-10-CM

## 2021-01-01 DIAGNOSIS — R53.1 WEAKNESS: ICD-10-CM

## 2021-01-01 LAB
ANION GAP SERPL CALC-SCNC: 3 MMOL/L (ref 0–18)
ANION GAP SERPL CALC-SCNC: 4 MMOL/L (ref 0–18)
ANION GAP SERPL CALC-SCNC: 8 MMOL/L (ref 0–18)
BASOPHILS # BLD AUTO: 0.01 X10(3) UL (ref 0–0.2)
BASOPHILS # BLD AUTO: 0.02 X10(3) UL (ref 0–0.2)
BASOPHILS # BLD AUTO: 0.02 X10(3) UL (ref 0–0.2)
BASOPHILS NFR BLD AUTO: 0.2 %
BASOPHILS NFR BLD AUTO: 0.3 %
BASOPHILS NFR BLD AUTO: 0.3 %
BUN BLD-MCNC: 20 MG/DL (ref 7–18)
BUN BLD-MCNC: 22 MG/DL (ref 7–18)
BUN BLD-MCNC: 24 MG/DL (ref 7–18)
BUN/CREAT SERPL: 18.5 (ref 10–20)
BUN/CREAT SERPL: 21.6 (ref 10–20)
BUN/CREAT SERPL: 24.2 (ref 10–20)
CALCIUM BLD-MCNC: 9.2 MG/DL (ref 8.5–10.1)
CALCIUM BLD-MCNC: 9.2 MG/DL (ref 8.5–10.1)
CALCIUM BLD-MCNC: 9.6 MG/DL (ref 8.5–10.1)
CHLORIDE SERPL-SCNC: 101 MMOL/L (ref 98–112)
CHLORIDE SERPL-SCNC: 104 MMOL/L (ref 98–112)
CHLORIDE SERPL-SCNC: 105 MMOL/L (ref 98–112)
CO2 SERPL-SCNC: 29 MMOL/L (ref 21–32)
CO2 SERPL-SCNC: 29 MMOL/L (ref 21–32)
CO2 SERPL-SCNC: 30 MMOL/L (ref 21–32)
CREAT BLD-MCNC: 0.91 MG/DL
CREAT BLD-MCNC: 1.08 MG/DL
CREAT BLD-MCNC: 1.11 MG/DL
DEPRECATED RDW RBC AUTO: 44.8 FL (ref 35.1–46.3)
DEPRECATED RDW RBC AUTO: 45.3 FL (ref 35.1–46.3)
DEPRECATED RDW RBC AUTO: 45.7 FL (ref 35.1–46.3)
EOSINOPHIL # BLD AUTO: 0.01 X10(3) UL (ref 0–0.7)
EOSINOPHIL # BLD AUTO: 0.05 X10(3) UL (ref 0–0.7)
EOSINOPHIL # BLD AUTO: 0.08 X10(3) UL (ref 0–0.7)
EOSINOPHIL NFR BLD AUTO: 0.1 %
EOSINOPHIL NFR BLD AUTO: 0.8 %
EOSINOPHIL NFR BLD AUTO: 1.8 %
ERYTHROCYTE [DISTWIDTH] IN BLOOD BY AUTOMATED COUNT: 13.1 % (ref 11–15)
ERYTHROCYTE [DISTWIDTH] IN BLOOD BY AUTOMATED COUNT: 13.2 % (ref 11–15)
ERYTHROCYTE [DISTWIDTH] IN BLOOD BY AUTOMATED COUNT: 13.2 % (ref 11–15)
GLUCOSE BLD-MCNC: 104 MG/DL (ref 70–99)
GLUCOSE BLD-MCNC: 108 MG/DL (ref 70–99)
GLUCOSE BLD-MCNC: 111 MG/DL (ref 70–99)
HCT VFR BLD AUTO: 30.6 %
HCT VFR BLD AUTO: 30.7 %
HCT VFR BLD AUTO: 34.9 %
HGB BLD-MCNC: 11.4 G/DL
HGB BLD-MCNC: 9.8 G/DL
HGB BLD-MCNC: 9.9 G/DL
IMM GRANULOCYTES # BLD AUTO: 0.01 X10(3) UL (ref 0–1)
IMM GRANULOCYTES # BLD AUTO: 0.01 X10(3) UL (ref 0–1)
IMM GRANULOCYTES # BLD AUTO: 0.02 X10(3) UL (ref 0–1)
IMM GRANULOCYTES NFR BLD: 0.2 %
IMM GRANULOCYTES NFR BLD: 0.2 %
IMM GRANULOCYTES NFR BLD: 0.3 %
LYMPHOCYTES # BLD AUTO: 0.79 X10(3) UL (ref 1–4)
LYMPHOCYTES # BLD AUTO: 0.82 X10(3) UL (ref 1–4)
LYMPHOCYTES # BLD AUTO: 1 X10(3) UL (ref 1–4)
LYMPHOCYTES NFR BLD AUTO: 11.8 %
LYMPHOCYTES NFR BLD AUTO: 12.9 %
LYMPHOCYTES NFR BLD AUTO: 22.2 %
MCH RBC QN AUTO: 30.2 PG (ref 26–34)
MCH RBC QN AUTO: 30.4 PG (ref 26–34)
MCH RBC QN AUTO: 30.7 PG (ref 26–34)
MCHC RBC AUTO-ENTMCNC: 32 G/DL (ref 31–37)
MCHC RBC AUTO-ENTMCNC: 32.2 G/DL (ref 31–37)
MCHC RBC AUTO-ENTMCNC: 32.7 G/DL (ref 31–37)
MCV RBC AUTO: 94.1 FL
MCV RBC AUTO: 94.2 FL
MCV RBC AUTO: 94.2 FL
MONOCYTES # BLD AUTO: 0.57 X10(3) UL (ref 0.1–1)
MONOCYTES # BLD AUTO: 0.77 X10(3) UL (ref 0.1–1)
MONOCYTES # BLD AUTO: 1.15 X10(3) UL (ref 0.1–1)
MONOCYTES NFR BLD AUTO: 17.1 %
MONOCYTES NFR BLD AUTO: 18.1 %
MONOCYTES NFR BLD AUTO: 8.5 %
NEUTROPHILS # BLD AUTO: 2.63 X10 (3) UL (ref 1.5–7.7)
NEUTROPHILS # BLD AUTO: 2.63 X10(3) UL (ref 1.5–7.7)
NEUTROPHILS # BLD AUTO: 4.32 X10 (3) UL (ref 1.5–7.7)
NEUTROPHILS # BLD AUTO: 4.32 X10(3) UL (ref 1.5–7.7)
NEUTROPHILS # BLD AUTO: 5.3 X10 (3) UL (ref 1.5–7.7)
NEUTROPHILS # BLD AUTO: 5.3 X10(3) UL (ref 1.5–7.7)
NEUTROPHILS NFR BLD AUTO: 58.5 %
NEUTROPHILS NFR BLD AUTO: 67.7 %
NEUTROPHILS NFR BLD AUTO: 79 %
OSMOLALITY SERPL CALC.SUM OF ELEC: 287 MOSM/KG (ref 275–295)
OSMOLALITY SERPL CALC.SUM OF ELEC: 290 MOSM/KG (ref 275–295)
OSMOLALITY SERPL CALC.SUM OF ELEC: 290 MOSM/KG (ref 275–295)
PLATELET # BLD AUTO: 136 10(3)UL (ref 150–450)
PLATELET # BLD AUTO: 146 10(3)UL (ref 150–450)
PLATELET # BLD AUTO: 163 10(3)UL (ref 150–450)
POTASSIUM SERPL-SCNC: 3.8 MMOL/L (ref 3.5–5.1)
POTASSIUM SERPL-SCNC: 4.2 MMOL/L (ref 3.5–5.1)
POTASSIUM SERPL-SCNC: 4.3 MMOL/L (ref 3.5–5.1)
POTASSIUM SERPL-SCNC: 4.4 MMOL/L (ref 3.5–5.1)
RBC # BLD AUTO: 3.25 X10(6)UL
RBC # BLD AUTO: 3.26 X10(6)UL
RBC # BLD AUTO: 3.71 X10(6)UL
SARS-COV-2 RNA RESP QL NAA+PROBE: NOT DETECTED
SODIUM SERPL-SCNC: 137 MMOL/L (ref 136–145)
SODIUM SERPL-SCNC: 138 MMOL/L (ref 136–145)
SODIUM SERPL-SCNC: 138 MMOL/L (ref 136–145)
URATE SERPL-MCNC: 6.3 MG/DL
WBC # BLD AUTO: 4.5 X10(3) UL (ref 4–11)
WBC # BLD AUTO: 6.4 X10(3) UL (ref 4–11)
WBC # BLD AUTO: 6.7 X10(3) UL (ref 4–11)

## 2021-01-01 PROCEDURE — 85027 COMPLETE CBC AUTOMATED: CPT

## 2021-01-01 PROCEDURE — 99285 EMERGENCY DEPT VISIT HI MDM: CPT

## 2021-01-01 PROCEDURE — 99308 SBSQ NF CARE LOW MDM 20: CPT | Performed by: NURSE PRACTITIONER

## 2021-01-01 PROCEDURE — 93456 R HRT CORONARY ARTERY ANGIO: CPT

## 2021-01-01 PROCEDURE — 36415 COLL VENOUS BLD VENIPUNCTURE: CPT

## 2021-01-01 PROCEDURE — 85025 COMPLETE CBC W/AUTO DIFF WBC: CPT | Performed by: EMERGENCY MEDICINE

## 2021-01-01 PROCEDURE — 84132 ASSAY OF SERUM POTASSIUM: CPT | Performed by: INTERNAL MEDICINE

## 2021-01-01 PROCEDURE — 97530 THERAPEUTIC ACTIVITIES: CPT

## 2021-01-01 PROCEDURE — 85025 COMPLETE CBC W/AUTO DIFF WBC: CPT | Performed by: NURSE PRACTITIONER

## 2021-01-01 PROCEDURE — 80048 BASIC METABOLIC PNL TOTAL CA: CPT | Performed by: NURSE PRACTITIONER

## 2021-01-01 PROCEDURE — 93010 ELECTROCARDIOGRAM REPORT: CPT | Performed by: INTERNAL MEDICINE

## 2021-01-01 PROCEDURE — 97162 PT EVAL MOD COMPLEX 30 MIN: CPT

## 2021-01-01 PROCEDURE — 93005 ELECTROCARDIOGRAM TRACING: CPT

## 2021-01-01 PROCEDURE — 96374 THER/PROPH/DIAG INJ IV PUSH: CPT

## 2021-01-01 PROCEDURE — 96376 TX/PRO/DX INJ SAME DRUG ADON: CPT

## 2021-01-01 PROCEDURE — B2111ZZ FLUOROSCOPY OF MULTIPLE CORONARY ARTERIES USING LOW OSMOLAR CONTRAST: ICD-10-PCS | Performed by: INTERNAL MEDICINE

## 2021-01-01 PROCEDURE — 85018 HEMOGLOBIN: CPT | Performed by: INTERNAL MEDICINE

## 2021-01-01 PROCEDURE — 73560 X-RAY EXAM OF KNEE 1 OR 2: CPT | Performed by: EMERGENCY MEDICINE

## 2021-01-01 PROCEDURE — 96375 TX/PRO/DX INJ NEW DRUG ADDON: CPT

## 2021-01-01 PROCEDURE — 84550 ASSAY OF BLOOD/URIC ACID: CPT | Performed by: NURSE PRACTITIONER

## 2021-01-01 PROCEDURE — 99152 MOD SED SAME PHYS/QHP 5/>YRS: CPT

## 2021-01-01 PROCEDURE — 1111F DSCHRG MED/CURRENT MED MERGE: CPT | Performed by: NURSE PRACTITIONER

## 2021-01-01 PROCEDURE — 80048 BASIC METABOLIC PNL TOTAL CA: CPT | Performed by: EMERGENCY MEDICINE

## 2021-01-01 PROCEDURE — 99309 SBSQ NF CARE MODERATE MDM 30: CPT | Performed by: NURSE PRACTITIONER

## 2021-01-01 PROCEDURE — 80048 BASIC METABOLIC PNL TOTAL CA: CPT

## 2021-01-01 PROCEDURE — 4A023N6 MEASUREMENT OF CARDIAC SAMPLING AND PRESSURE, RIGHT HEART, PERCUTANEOUS APPROACH: ICD-10-PCS | Performed by: INTERNAL MEDICINE

## 2021-01-01 PROCEDURE — 99153 MOD SED SAME PHYS/QHP EA: CPT

## 2021-01-01 RX ORDER — HYDROCODONE BITARTRATE AND ACETAMINOPHEN 5; 325 MG/1; MG/1
2 TABLET ORAL EVERY 4 HOURS PRN
Status: DISCONTINUED | OUTPATIENT
Start: 2021-01-01 | End: 2021-01-01

## 2021-01-01 RX ORDER — APIXABAN 2.5 MG/1
2.5 TABLET, FILM COATED ORAL 2 TIMES DAILY
Refills: 0 | Status: SHIPPED | COMMUNITY
Start: 2021-01-01

## 2021-01-01 RX ORDER — HYDROCODONE BITARTRATE AND ACETAMINOPHEN 5; 325 MG/1; MG/1
1 TABLET ORAL EVERY 4 HOURS PRN
Status: DISCONTINUED | OUTPATIENT
Start: 2021-01-01 | End: 2021-01-01

## 2021-01-01 RX ORDER — MORPHINE SULFATE 2 MG/ML
2 INJECTION, SOLUTION INTRAMUSCULAR; INTRAVENOUS EVERY 4 HOURS PRN
Status: DISCONTINUED | OUTPATIENT
Start: 2021-01-01 | End: 2021-01-01

## 2021-01-01 RX ORDER — MORPHINE SULFATE 2 MG/ML
2 INJECTION, SOLUTION INTRAMUSCULAR; INTRAVENOUS EVERY 2 HOUR PRN
Status: DISCONTINUED | OUTPATIENT
Start: 2021-01-01 | End: 2021-01-01

## 2021-01-01 RX ORDER — SODIUM CHLORIDE 9 MG/ML
INJECTION, SOLUTION INTRAVENOUS
Status: COMPLETED | OUTPATIENT
Start: 2021-01-01 | End: 2021-01-01

## 2021-01-01 RX ORDER — HYDROCODONE BITARTRATE AND ACETAMINOPHEN 5; 325 MG/1; MG/1
1 TABLET ORAL EVERY 4 HOURS PRN
Qty: 10 TABLET | Refills: 0 | Status: SHIPPED | OUTPATIENT
Start: 2021-01-01

## 2021-01-01 RX ORDER — MORPHINE SULFATE 4 MG/ML
4 INJECTION, SOLUTION INTRAMUSCULAR; INTRAVENOUS EVERY 2 HOUR PRN
Status: DISCONTINUED | OUTPATIENT
Start: 2021-01-01 | End: 2021-01-01

## 2021-01-01 RX ORDER — SODIUM CHLORIDE 9 MG/ML
INJECTION, SOLUTION INTRAVENOUS CONTINUOUS
Status: DISCONTINUED | OUTPATIENT
Start: 2021-01-01 | End: 2021-01-01

## 2021-01-01 RX ORDER — MIDAZOLAM HYDROCHLORIDE 1 MG/ML
INJECTION INTRAMUSCULAR; INTRAVENOUS
Status: COMPLETED
Start: 2021-01-01 | End: 2021-01-01

## 2021-01-01 RX ORDER — METOCLOPRAMIDE HYDROCHLORIDE 5 MG/ML
10 INJECTION INTRAMUSCULAR; INTRAVENOUS EVERY 8 HOURS PRN
Status: DISCONTINUED | OUTPATIENT
Start: 2021-01-01 | End: 2021-01-01

## 2021-01-01 RX ORDER — BUMETANIDE 1 MG/1
0.5 TABLET ORAL
Status: DISCONTINUED | OUTPATIENT
Start: 2021-01-01 | End: 2021-01-01

## 2021-01-01 RX ORDER — HYDROCODONE BITARTRATE AND ACETAMINOPHEN 5; 325 MG/1; MG/1
1 TABLET ORAL ONCE
Status: COMPLETED | OUTPATIENT
Start: 2021-01-01 | End: 2021-01-01

## 2021-01-01 RX ORDER — VERAPAMIL HYDROCHLORIDE 2.5 MG/ML
INJECTION, SOLUTION INTRAVENOUS
Status: COMPLETED
Start: 2021-01-01 | End: 2021-01-01

## 2021-01-01 RX ORDER — MORPHINE SULFATE 2 MG/ML
1 INJECTION, SOLUTION INTRAMUSCULAR; INTRAVENOUS EVERY 2 HOUR PRN
Status: DISCONTINUED | OUTPATIENT
Start: 2021-01-01 | End: 2021-01-01

## 2021-01-01 RX ORDER — MIRTAZAPINE 15 MG/1
7.5 TABLET, FILM COATED ORAL NIGHTLY PRN
Status: DISCONTINUED | OUTPATIENT
Start: 2021-01-01 | End: 2021-01-01

## 2021-01-01 RX ORDER — DILTIAZEM HYDROCHLORIDE 120 MG/1
120 CAPSULE, EXTENDED RELEASE ORAL DAILY
Status: DISCONTINUED | OUTPATIENT
Start: 2021-01-01 | End: 2021-01-01

## 2021-01-01 RX ORDER — ACETAMINOPHEN 325 MG/1
TABLET ORAL
Status: DISCONTINUED
Start: 2021-01-01 | End: 2021-01-01

## 2021-01-01 RX ORDER — LIDOCAINE HYDROCHLORIDE 20 MG/ML
INJECTION, SOLUTION EPIDURAL; INFILTRATION; INTRACAUDAL; PERINEURAL
Status: COMPLETED
Start: 2021-01-01 | End: 2021-01-01

## 2021-01-01 RX ORDER — ACETAMINOPHEN 325 MG/1
650 TABLET ORAL EVERY 4 HOURS PRN
Status: DISCONTINUED | OUTPATIENT
Start: 2021-01-01 | End: 2021-01-01

## 2021-01-01 RX ORDER — POTASSIUM CHLORIDE 20 MEQ/1
40 TABLET, EXTENDED RELEASE ORAL ONCE
Status: COMPLETED | OUTPATIENT
Start: 2021-01-01 | End: 2021-01-01

## 2021-01-01 RX ORDER — ONDANSETRON 2 MG/ML
4 INJECTION INTRAMUSCULAR; INTRAVENOUS EVERY 6 HOURS PRN
Status: DISCONTINUED | OUTPATIENT
Start: 2021-01-01 | End: 2021-01-01

## 2021-01-01 RX ORDER — HEPARIN SODIUM 1000 [USP'U]/ML
INJECTION, SOLUTION INTRAVENOUS; SUBCUTANEOUS
Status: COMPLETED
Start: 2021-01-01 | End: 2021-01-01

## 2021-01-01 RX ADMIN — SODIUM CHLORIDE: 9 INJECTION, SOLUTION INTRAVENOUS at 13:45:00

## 2021-01-01 RX ADMIN — SODIUM CHLORIDE: 9 INJECTION, SOLUTION INTRAVENOUS at 15:58:00

## 2021-01-28 NOTE — SPIRITUAL CARE NOTE
Visited per consult, pt sleeping. Followed-up and pt was in conversation with social work. Left card. Follow-up needed.

## 2021-01-28 NOTE — CM/SW NOTE
SW received MDO for discharge planning. SW met with patient at bedside to complete initial assessment. Patient confirmed home address. Patient reports that she lives in a 2 level home with 12 stairs with her .  Patient reports that she lives on t

## 2021-01-28 NOTE — H&P
[facility] Hospitalist Team  History and Physical  Admit Date:  1/27/21    ASSESSMENT / PLAN:   Ms. Vijay Wiseman is a [address] yo F with PMH of Afib on Eliquis, LE edema, CKD stage 3, HTN, OA shoulder, severe aortic stenosis and hemiarthrosis of right knee who presents with r rolling walker while she is outside for safety purposes. She then states she returned home from test and took a nap,  after getting up from her nap she attempted to get up and had severe pain in her right knee and was unable to walk.   Patient has a histor HYSTERECTOMY      DUB - Dr. Sallie Armstrong   • LUMPECTOMY LEFT     • LUMPECTOMY RIGHT     • GIGI LOCALIZATION WIRE 1 SITE LEFT (CPT=19281)     • GIGI LOCALIZATION WIRE 1 SITE RIGHT (CPT=19281)     • OTHER SURGICAL HISTORY      benign breast tumors        ALL:    S comprehensive 10 point review of systems was completed. Pertinent positives and negatives noted in the the HPI.       DIAGNOSTIC DATA:   CBC/Chem  Recent Labs   Lab 01/27/21  2203   WBC 6.7   HGB 11.4*   MCV 94.1   .0       Recent Labs   Lab 01/27/2 kg/m²     Exam  GEN: elderly female in NAD  HEENT: EOMI  Neck: Supple,   Pulm: CTAB, no crackles or wheezes  CV: RRR, no murmurs   ABD: Soft, non-tender, non-distended, +BS  MSK: R knee with effusion, compression dressing in place, decreased ROM  Neuro: Gr

## 2021-01-28 NOTE — ED NOTES
Patient taken to floor by ED Tech. Patient aware of plan of care, verbalizes understanding. Patient to floor with belongings.

## 2021-01-28 NOTE — PLAN OF CARE
A&Ox4. Admission from the ED for increased swelling of the R knee with increased pain. Patient reported she is unable to put any weight on her right leg.  at bedside for admission. Patient in a 9/10 pain, was given PO [place] in the ED.  Gave IV mo INTERVENTIONS:  - Encourage pt to monitor pain and request assistance  - Assess pain using appropriate pain scale  - Administer analgesics based on type and severity of pain and evaluate response  - Implement non-pharmacological measures as appropriate and skin color and temperature  - Assess for signs of decreased coronary artery perfusion - ex.  Angina  - Evaluate fluid balance, assess for edema, trend weights  Outcome: Progressing  Goal: Absence of cardiac arrhythmias or at baseline  Description: INTERVENT

## 2021-01-28 NOTE — HOME CARE LIAISON
Received referral from Joby Malone. Spoke to pt and she is declining hh services at this time. SANDRA Hdz updated.

## 2021-01-28 NOTE — DIETARY NOTE
Brief Nutrition Note       Screened and reviewed chart. Noted BMI 19.6 kg/m2--low for age group. Current diet: Cardiac. At visit, pt reports eating well, denies feeding problems. Ate 80% of breakfast today.  States maintaining wt at 105-107#, consist

## 2021-01-28 NOTE — ED PROVIDER NOTES
Patient Seen in: [facility] AND [facility] Emergency Department      History   Patient presents with:  Knee Pain    Stated Complaint: knee pain/swelling    HPI/Subjective:   HPI    80-year-old female with history of hypertension, atrial fibrillation presently ta 20   Temp 99.8 °F (37.7 °C)   Temp src Temporal   SpO2 99 %   O2 Device None (Room air)       Current:/75   Pulse 87   Temp 99.8 °F (37.7 °C) (Temporal)   Resp 20   Ht 157.5 cm (5' 2\")   Wt 49.9 kg   SpO2 99%   BMI 20.12 kg/m²         Physical Exam reports she had severe pain in October when arthrocentesis was performed. She reports some relief of pain post Norco but states she is unable to stand or ambulate and cannot go home. Will admit for pain control and further evaluation.   Discussed with

## 2021-01-28 NOTE — CONSULTS
iVolette Murray is a 80year old female who I assessed as follows:  Patient presents with:  Knee Pain         REASON FOR CONSULTATION:    Atrial fibrillation            ASSESSMENT/PLAN:     IMPRESSIONS:  1.  Suprapatellar r SITE LEFT (CPT=19281)     • GIGI LOCALIZATION WIRE 1 SITE RIGHT (CPT=19281)     • OTHER SURGICAL HISTORY      benign breast tumors     Family History   Problem Relation Age of Onset   • Cancer Mother         Breast Cancer 71   • Breast Cancer Mother 64   • syncope, or palpitations  GI: denies abdominal pain, nausea and vomiting  NEURO: denies headaches and focal neurologic symptoms  PSYCH: no recent depression  ENDOCRINE: no change in sleep pattern  HEME: no easy bruising  All other systems reviewed and nega Finalized by (CST): Lennie Santo MD on 1/27/2021 at 9:43 PM                 ECHO:  1. Left ventricle: The cavity size is normal. There is focal basal hypertrophy. Systolic function is normal. The estimated ejection fraction is 60-65%.  Wall motion is russell

## 2021-01-28 NOTE — ED NOTES
Orders for admission, patient is aware of plan and ready to go upstairs. Any questions, please call ED RN Hugo Laureano  at extension [phone].    Type of COVID test sent: Rapid  COVID Suspicion level: Low    Titratable drug(s) infusing:n/a  Rate:    LOC at time of

## 2021-01-28 NOTE — ED INITIAL ASSESSMENT (HPI)
Patient presents to ER via EMS for complaints of atraumatic right knee pain and swelling. Patient admits she was laying in bed, when she began having pain and noticed some swelling. Patient having difficulties bearing weight.    Admits this happened to

## 2021-01-29 NOTE — PLAN OF CARE
Problem: Patient Centered Care  Goal: Patient preferences are identified and integrated in the patient's plan of care  Description: Interventions:  - What would you like us to know as we care for you? From home with .  Has limited mobility in the u pre-medicate as appropriate  Outcome: Completed     Problem: MUSCULOSKELETAL - ADULT  Goal: Return mobility to safest level of function  Description: INTERVENTIONS:  - Assess patient stability and activity tolerance for standing, transferring and ambulatin temperature  - Assess for signs of decreased coronary artery perfusion - ex.  Angina  - Evaluate fluid balance, assess for edema, trend weights  Outcome: Completed  Goal: Absence of cardiac arrhythmias or at baseline  Description: INTERVENTIONS:  - Continuo

## 2021-01-29 NOTE — PROGRESS NOTES
[facility] Hospitalist Team  Progress Note    Mitchel Jimenez Patient Status:  Observation    1930 MRN [id number]   [facility] Attending Alicia Riggs MD   Hosp Day # 0 PCP Alycia Godinez MD     CC: Follow Up  PCP: Canelo Laureano and  rec rehab, she is in agreement. Feeling cold.  will be seeing her later      OBJECTIVE:   Blood pressure 120/89, pulse 91, temperature 99.8 °F (37.7 °C), temperature source Oral, resp.  rate 18, height 5' 2\" (1.575 m), weight 107 lb 4.8 oz (48. Findings are similar to the comparison study of 10/05/2020. No evidence for acute osseous abnormality.     Dictated by (CST): Des Rodriguez MD on 1/27/2021 at 9:39 PM     Finalized by (CST): Des Rodriguez MD on 1/27/2021 at 9:43 PM              SEE ATTENDI

## 2021-01-29 NOTE — PLAN OF CARE
Patient is A&Ox4. On room air. Continuing to manage pain in the Right knee with Norco and Morphine. Patient only requested the morphine once for increased pain.  Ortho consulted and removed the immobilizer from the patients leg- recommended for patient to i evaluate response  - Consider cultural and social influences on pain and pain management  - Manage/alleviate anxiety  - Utilize distraction and/or relaxation techniques  - Monitor for opioid side effects  - Notify MD/LIP if interventions unsuccessful or pa Monitor vital signs, rhythm, and trends  - Monitor for bleeding, hypotension and signs of decreased cardiac output  - Evaluate effectiveness of vasoactive medications to optimize hemodynamic stability  - Monitor arterial and/or venous puncture sites for bl

## 2021-01-29 NOTE — PROGRESS NOTES
[facility]  Progress Note    Fide Knapp Patient Status:  Observation    1930 MRN [id number]   Location [facility] 5SW/SE Attending Andrew Tobin MD   Hosp Day # 0 PCP Nancy Hendrickson MD     Impression:  Eleazar Mars tablet, 1 tablet, Oral, Q4H PRN    Or    •  HYDROcodone-acetaminophen (NORCO) 5-325 MG per tab 2 tablet, 2 tablet, Oral, Q4H PRN    •  ondansetron HCl (ZOFRAN) injection 4 mg, 4 mg, Intravenous, Q6H PRN    •  Metoclopramide HCl (REGLAN) injection 10 mg, 10  (H) 09/27/2017     Lab Results   Component Value Date    AST 31 09/15/2020    AST 33 09/26/2019    AST 28 11/21/2018     Lab Results   Component Value Date    ALT 17 09/15/2020    ALT 26 09/26/2019    ALT 13 (L) 11/21/2018        Imaging:  Xr Knee

## 2021-01-29 NOTE — PROGRESS NOTES
Pt sitting in chair eating her lunch. Pt expressed that she is doing well and that her  is very supportive.  provided active listening and encouragement.         01/29/21 7944   Clinical Encounter Type   Visited With Patient   Routine Visit F

## 2021-01-29 NOTE — CONSULTS
Orthopaedic Surgery Consult - Initial    Consulting Service: [facility] NP  Reason for consult: left knee swelling  CC: left knee swelling and pain  HPI: 79yo f w pmh for mmp including a fib, aortic stenosis admitted yesterday night for left knee pain and swe Date   • APPENDECTOMY     • APPENDECTOMY     • COLONOSCOPY     • HYSTERECTOMY      DUB - Dr. Gloria Sanders   • LUMPECTOMY LEFT     • LUMPECTOMY RIGHT     • GIGI LOCALIZATION WIRE 1 SITE LEFT (CPT=19281)     • GIGI LOCALIZATION WIRE 1 SITE RIGHT (CPT=19281)     • Special Diet: Not Asked        Back Care: Not Asked        Exercise: No        Bike Helmet: Not Asked        Seat Belt: Not Asked        Self-Exams: Not Asked    Social History Narrative      Not on file    Social History    Occupational History      Occup were addressed to the patient and  liking.          Elmer Hernandez MD  Orthopaedic Adult Reconstruction Surgery  [facility]  Scheduling/Clinical Assistant: [phone] absent

## 2021-01-29 NOTE — SPIRITUAL CARE NOTE
Follow-up visit to assess needs. Pt sitting comfortably in chair. Pt shared she had just received the difficult news of needing a heart procedure following her stay in rehab to become more mobile. Pt is Sikh and dandre is important to her.  She ap

## 2021-01-29 NOTE — DISCHARGE SUMMARY
[facility] Hospitalist Discharge Summary   Patient ID:  Macy Calloway  [id number]  17 year old  2/6/1930    Admit date: 1/27/2021  Discharge date: 1/29/2021    Primary Care Physician: Tammy Marie MD   Attending Physician: Spencer Garza MD   Consults: eval   -hold bumex, uses for LE edema   -? TAVR outpt eval, defer to Dr Román Haddad     PAF  -continue cardizem  -resume eliquis in 1 week, 2/4  -? consider anti-arrhythmic, Watchman device, etc  -follow up with cards      CKD Stage 3  -follow      LE Edema  -us VITAMIN E     VITAMIN-B COMPLEX OR           Where to Get Your Medications      You can get these medications from any pharmacy    Bring a paper prescription for each of these medications  · HYDROcodone-acetaminophen 5-325 MG Tabs     Important follow up NAD  HEENT: EOMI  Neck: Supple,   Pulm: CTAB, no crackles or wheezes  CV: RRR, no murmurs   ABD: Soft, non-tender, non-distended, +BS  MSK: R knee with effusion, decreased ROM  Neuro: Grossly normal, CN intact, sensory intact  Psych: Affect- normal  SKIN:

## 2021-01-29 NOTE — PHYSICAL THERAPY NOTE
PHYSICAL THERAPY EVALUATION - INPATIENT     Room Number: 541/541-A  Evaluation Date: 1/29/2021  Type of Evaluation: Initial   Physician Order: PT Eval and Treat    Presenting Problem: Hemarthrosis of R knee  Reason for Therapy: Mobility Dysfunction and Elizabeth Coleman Degree of Impairment: 72.57% has been calculated based on documentation in the [facility] '6 clicks' Inpatient Basic Mobility Short Form.   Research supports that patients with this level of impairment may benefit from sub-acute rehab to optimize functional outc Uses: Glasses    Prior Level of [place]: Independent in ADL's and IADL's with exception of driving (spouse drives). Patient ambulates with RW \"I borrowed the walker from the [employer]\"    SUBJECTIVE  \"I'm sorry, I'm nervous I'm going to fall.  Sandy Reyes Standardized Score (AM-PAC Scale): 33.86   CMS Modifier (G-Code): CL    FUNCTIONAL ABILITY STATUS  Gait Assessment   Gait Assistance: Not tested           Stoop/Curb Assistance: Not tested   Exercise/Education Provided:  Bed mobility  Body mechanics  Tasneem

## 2021-02-01 NOTE — PROGRESS NOTES
HPI: Reggie Farrar  Is a 79 yo female with PMH of Afib on Eliquis, LE edema, CKD stage 3, HTN, OA shoulder, severe aortic stenosis, right knee hemarthrosis who was admitted to [facility] 1/27/21-1/29/21 with large right knee effusion.  Seen by ortho, no plans fo Reviewed   LABS/Imaging: Reviewed     SUBJECTIVE/REVIEW OF SYSTEMS:  Seen in room. Pain in right knee controlled. Denies sob/cough/cp/palpiations. Denies hematuria/dysuria/frequency. Denies n/v/d.     PHYSICAL EXAM:  GENERAL HEALTH: NAD  HEENT: atraumatic/n

## 2021-02-03 NOTE — PROGRESS NOTES
HPI: Gail Lloyd  Is a 79 yo female with PMH of Afib on Eliquis, LE edema, CKD stage 3, HTN, OA shoulder, severe aortic stenosis, right knee hemarthrosis who was admitted to [facility] 1/27/21-1/29/21 with large right knee effusion.  Seen by ortho, no plans fo MUSCULOSKELETAL/EXTREMITIES:+ R knee swelling improved  SKIN: warm, dry  NEUROLOGIC: intact; no sensorimotor deficit, follows commands  PSYCHIATRIC: alert and oriented x 3; affect appropriate    ASSESSMENT/PLAN  Continue PT/OT, ambulating 150ft in therap

## 2021-02-09 NOTE — PROGRESS NOTES
HPI: Ayad George  Is a 79 yo female with PMH of Afib on Eliquis, LE edema, CKD stage 3, HTN, OA shoulder, severe aortic stenosis, right knee hemarthrosis who was admitted to [address] [place] [address] 1/27/21-1/29/21 with large right knee effusion.  Seen by ortho, no plans fo knee swelling improved  SKIN: warm, dry  NEUROLOGIC: intact; no sensorimotor deficit, follows commands  PSYCHIATRIC: alert and oriented x 3; affect appropriate    ASSESSMENT/PLAN  Discharging to home with  and [facility] services  rx signed  F/u pcp 1-2 clemencia

## 2021-04-29 NOTE — H&P (VIEW-ONLY)
[facility]       CC: Aortic Stenosis  HPI:    Traci Hudson is a 80year old female with a history of HTN and paroxysmal atrial fibrillation who presents for evaluation of severe aortic stenosis.     She does have a history of R knee No tobacco. Rare etoh. Family History:   Son passed of MI in 48.                                          Allergies:    Shellfish-Derived P*    ANAPHYLAXIS  Iodine I 131 Tositu*    Dyspnea  Penicil 18.96 kg/m²   General: On physical examination this is an elderly woman in no acute distress. HEENT: Normocephalic and atraumatic. No scleral icterus. Oral mucosa well hydrated. Skin: Intact without lesions.    Neck: Carotid upstrokes were motion is normal; there are no regional wall motion      abnormalities. 2. Aortic valve: Trileaflet; mildly thickened, moderately calcified      leaflets. There is severe stenosis. There is mild regurgitation. Peak      velocity (S): 4.1m/sec.  Mean gradi coronary angiography with Dr. Bertin Garcia at [place] in the next 1-2 weeks. Cardiac surgical consultation  CTA TAVR protocol and valve clinic appointment.    Following the above testing and appointments, we will meet as a multidisciplinary valve team to misti

## 2021-05-19 NOTE — PROCEDURES
Procedure Report    Indication for procedure: severe aortic stenosis    Procedures performed:  1) Right heart catheterization  2) Coronary angiography   3) Supervision of moderate sedation from 1441 to 1511, with a total of 2mg versed and 150mcg fentanyl. units    Recommendations:  1) Bedrest x 2 hours  2) Medical management and risk factor modification  3) Outpatient followup in 1-2 weeks

## 2021-05-19 NOTE — INTERVAL H&P NOTE
Pre-op Diagnosis: * No pre-op diagnosis entered *    The above referenced H&P was reviewed by Jeannette Londono MD on 5/19/2021, the patient was examined and no significant changes have occurred in the patient's condition since the H&P was performed.   I discuss

## 2021-05-19 NOTE — IVS NOTE
DISCHARGE NOTE     Pt is able to sit up and ambulate without difficulty. Pt  tolerated fluids and food. R groin and R wrist remains dry and intact with good circulation, motion, and sensation. No signs and symptoms of bleeding/hematoma noted.    R arm

## 2021-05-24 DIAGNOSIS — I35.0 NONRHEUMATIC AORTIC (VALVE) STENOSIS: Primary | ICD-10-CM

## 2021-05-26 ENCOUNTER — APPOINTMENT (OUTPATIENT)
Dept: CT IMAGING | Age: 86
End: 2021-05-26
Attending: INTERNAL MEDICINE

## 2021-05-26 ENCOUNTER — APPOINTMENT (OUTPATIENT)
Dept: RESPIRATORY THERAPY | Age: 86
End: 2021-05-26
Attending: INTERNAL MEDICINE

## 2021-05-26 ENCOUNTER — APPOINTMENT (OUTPATIENT)
Dept: ULTRASOUND IMAGING | Age: 86
End: 2021-05-26
Attending: INTERNAL MEDICINE

## 2021-06-01 ENCOUNTER — TELEPHONE (OUTPATIENT)
Dept: CARDIOLOGY | Age: 86
End: 2021-06-01

## 2021-06-02 ENCOUNTER — HOSPITAL ENCOUNTER (OUTPATIENT)
Dept: CT IMAGING | Age: 86
Discharge: HOME | End: 2021-06-02
Attending: INTERNAL MEDICINE

## 2021-06-02 ENCOUNTER — HOSPITAL ENCOUNTER (OUTPATIENT)
Dept: ULTRASOUND IMAGING | Age: 86
Discharge: HOME | End: 2021-06-02
Attending: INTERNAL MEDICINE

## 2021-06-02 ENCOUNTER — HOSPITAL ENCOUNTER (OUTPATIENT)
Dept: RESPIRATORY THERAPY | Age: 86
Discharge: HOME | End: 2021-06-02
Attending: INTERNAL MEDICINE

## 2021-06-02 ENCOUNTER — OFFICE VISIT (OUTPATIENT)
Dept: CARDIOLOGY | Age: 86
End: 2021-06-02
Attending: INTERNAL MEDICINE

## 2021-06-02 ENCOUNTER — APPOINTMENT (OUTPATIENT)
Dept: CARDIOLOGY | Age: 86
End: 2021-06-02
Attending: INTERNAL MEDICINE

## 2021-06-02 VITALS
SYSTOLIC BLOOD PRESSURE: 120 MMHG | HEIGHT: 61 IN | BODY MASS INDEX: 19.56 KG/M2 | RESPIRATION RATE: 16 BRPM | WEIGHT: 103.62 LBS | HEART RATE: 80 BPM | DIASTOLIC BLOOD PRESSURE: 62 MMHG

## 2021-06-02 VITALS — SYSTOLIC BLOOD PRESSURE: 135 MMHG | HEART RATE: 75 BPM | DIASTOLIC BLOOD PRESSURE: 75 MMHG

## 2021-06-02 DIAGNOSIS — I35.0 SEVERE AORTIC STENOSIS: ICD-10-CM

## 2021-06-02 DIAGNOSIS — Z01.810 PRE-PROCEDURAL CARDIOVASCULAR EXAMINATION: ICD-10-CM

## 2021-06-02 DIAGNOSIS — R09.89 OTHER SPECIFIED SYMPTOMS AND SIGNS INVOLVING THE CIRCULATORY AND RESPIRATORY SYSTEMS: ICD-10-CM

## 2021-06-02 DIAGNOSIS — I35.0 NONRHEUMATIC AORTIC (VALVE) STENOSIS: ICD-10-CM

## 2021-06-02 DIAGNOSIS — I35.0 SEVERE AORTIC STENOSIS: Primary | ICD-10-CM

## 2021-06-02 DIAGNOSIS — R09.89 SYMPTOMS INVOLVING CARDIOVASCULAR SYSTEM: ICD-10-CM

## 2021-06-02 LAB
ABO + RH BLD: NORMAL
ALBUMIN SERPL-MCNC: 4 G/DL (ref 3.6–5.1)
ALBUMIN/GLOB SERPL: 1 {RATIO} (ref 1–2.4)
ALP SERPL-CCNC: 81 UNITS/L (ref 45–117)
ALT SERPL-CCNC: 23 UNITS/L
ANION GAP SERPL CALC-SCNC: 14 MMOL/L (ref 10–20)
AST SERPL-CCNC: 23 UNITS/L
ATRIAL RATE (BPM): 105
BASOPHILS # BLD: 0 K/MCL (ref 0–0.3)
BASOPHILS NFR BLD: 0 %
BILIRUB SERPL-MCNC: 0.4 MG/DL (ref 0.2–1)
BLD GP AB SCN SERPL QL GEL: NEGATIVE
BUN SERPL-MCNC: 32 MG/DL (ref 6–20)
BUN/CREAT SERPL: 33 (ref 7–25)
CALCIUM SERPL-MCNC: 9.9 MG/DL (ref 8.4–10.2)
CHLORIDE SERPL-SCNC: 99 MMOL/L (ref 98–107)
CO2 SERPL-SCNC: 26 MMOL/L (ref 21–32)
CREAT SERPL-MCNC: 0.97 MG/DL (ref 0.51–0.95)
DEPRECATED RDW RBC: 48.9 FL (ref 39–50)
EOSINOPHIL # BLD: 0 K/MCL (ref 0–0.5)
EOSINOPHIL NFR BLD: 0 %
ERYTHROCYTE [DISTWIDTH] IN BLOOD: 14.2 % (ref 11–15)
FASTING DURATION TIME PATIENT: ABNORMAL H
GFR SERPLBLD BASED ON 1.73 SQ M-ARVRAT: 51 ML/MIN/1.73M2
GLOBULIN SER-MCNC: 4 G/DL (ref 2–4)
GLUCOSE SERPL-MCNC: 234 MG/DL (ref 65–99)
HCT VFR BLD CALC: 36.1 % (ref 36–46.5)
HGB BLD-MCNC: 11.6 G/DL (ref 12–15.5)
IMM GRANULOCYTES # BLD AUTO: 0 K/MCL (ref 0–0.2)
IMM GRANULOCYTES # BLD: 0 %
LYMPHOCYTES # BLD: 0.4 K/MCL (ref 1–4)
LYMPHOCYTES NFR BLD: 7 %
MCH RBC QN AUTO: 30.4 PG (ref 26–34)
MCHC RBC AUTO-ENTMCNC: 32.1 G/DL (ref 32–36.5)
MCV RBC AUTO: 94.8 FL (ref 78–100)
MONOCYTES # BLD: 0.1 K/MCL (ref 0.3–0.9)
MONOCYTES NFR BLD: 1 %
NEUTROPHILS # BLD: 5.6 K/MCL (ref 1.8–7.7)
NEUTROPHILS NFR BLD: 92 %
NRBC BLD MANUAL-RTO: 0 /100 WBC
NT-PROBNP SERPL-MCNC: 1271 PG/ML
P AXIS (DEGREES): 103
PLATELET # BLD AUTO: 276 K/MCL (ref 140–450)
POTASSIUM SERPL-SCNC: 3.8 MMOL/L (ref 3.4–5.1)
PR-INTERVAL (MSEC): 174
PROT SERPL-MCNC: 8 G/DL (ref 6.4–8.2)
QRS-INTERVAL (MSEC): 86
QT-INTERVAL (MSEC): 350
QTC: 463
R AXIS (DEGREES): -13
RBC # BLD: 3.81 MIL/MCL (ref 4–5.2)
REPORT TEXT: NORMAL
SODIUM SERPL-SCNC: 135 MMOL/L (ref 135–145)
T AXIS (DEGREES): 82
TYPE AND SCREEN EXPIRATION DATE: NORMAL
VENTRICULAR RATE EKG/MIN (BPM): 105
WBC # BLD: 6.1 K/MCL (ref 4.2–11)

## 2021-06-02 PROCEDURE — 93880 EXTRACRANIAL BILAT STUDY: CPT

## 2021-06-02 PROCEDURE — 85025 COMPLETE CBC W/AUTO DIFF WBC: CPT | Performed by: INTERNAL MEDICINE

## 2021-06-02 PROCEDURE — 74174 CTA ABD&PLVS W/CONTRAST: CPT

## 2021-06-02 PROCEDURE — 83880 ASSAY OF NATRIURETIC PEPTIDE: CPT | Performed by: INTERNAL MEDICINE

## 2021-06-02 PROCEDURE — 93005 ELECTROCARDIOGRAM TRACING: CPT | Performed by: INTERNAL MEDICINE

## 2021-06-02 PROCEDURE — 10002805 HB CONTRAST AGENT: Performed by: INTERNAL MEDICINE

## 2021-06-02 PROCEDURE — 94010 BREATHING CAPACITY TEST: CPT

## 2021-06-02 PROCEDURE — 86901 BLOOD TYPING SEROLOGIC RH(D): CPT | Performed by: INTERNAL MEDICINE

## 2021-06-02 PROCEDURE — 96374 THER/PROPH/DIAG INJ IV PUSH: CPT

## 2021-06-02 PROCEDURE — 71275 CT ANGIOGRAPHY CHEST: CPT

## 2021-06-02 PROCEDURE — 10002016 HB COUNTER INCENTIVE SPIROMETRY

## 2021-06-02 PROCEDURE — 10002801 HB RX 250 W/O HCPCS: Performed by: INTERNAL MEDICINE

## 2021-06-02 PROCEDURE — 13001086 HB INCENTIVE SPIROMETER W INSTRUCT

## 2021-06-02 PROCEDURE — 80053 COMPREHEN METABOLIC PANEL: CPT | Performed by: INTERNAL MEDICINE

## 2021-06-02 PROCEDURE — 36415 COLL VENOUS BLD VENIPUNCTURE: CPT | Performed by: INTERNAL MEDICINE

## 2021-06-02 PROCEDURE — 10002800 HB RX 250 W HCPCS: Performed by: INTERNAL MEDICINE

## 2021-06-02 RX ORDER — MIRTAZAPINE 7.5 MG/1
7.5 TABLET, FILM COATED ORAL AT BEDTIME
Status: ON HOLD | COMMUNITY
Start: 2020-12-07 | End: 2021-07-08 | Stop reason: HOSPADM

## 2021-06-02 RX ORDER — BUMETANIDE 0.5 MG/1
TABLET ORAL
Status: ON HOLD | COMMUNITY
Start: 2020-12-07 | End: 2021-06-15 | Stop reason: HOSPADM

## 2021-06-02 RX ORDER — ACETAMINOPHEN 325 MG/1
650 TABLET ORAL
Status: ON HOLD | COMMUNITY
Start: 2020-10-06 | End: 2021-07-08 | Stop reason: HOSPADM

## 2021-06-02 RX ORDER — DILTIAZEM HYDROCHLORIDE 120 MG/1
120 CAPSULE, EXTENDED RELEASE ORAL DAILY
Status: ON HOLD | COMMUNITY
Start: 2020-10-26 | End: 2021-06-15 | Stop reason: HOSPADM

## 2021-06-02 RX ORDER — METOPROLOL TARTRATE 1 MG/ML
INJECTION, SOLUTION INTRAVENOUS PRN
Status: COMPLETED | OUTPATIENT
Start: 2021-06-02 | End: 2021-06-02

## 2021-06-02 RX ORDER — HYDROCODONE BITARTRATE AND ACETAMINOPHEN 5; 325 MG/1; MG/1
1 TABLET ORAL PRN
Status: ON HOLD | COMMUNITY
Start: 2021-01-29 | End: 2021-07-08 | Stop reason: HOSPADM

## 2021-06-02 RX ORDER — METHYLPREDNISOLONE SODIUM SUCCINATE 125 MG/2ML
125 INJECTION, POWDER, LYOPHILIZED, FOR SOLUTION INTRAMUSCULAR; INTRAVENOUS ONCE
Status: COMPLETED | OUTPATIENT
Start: 2021-06-02 | End: 2021-06-02

## 2021-06-02 RX ADMIN — METOPROLOL TARTRATE 5 MG: 5 INJECTION INTRAVENOUS at 12:51

## 2021-06-02 RX ADMIN — IOHEXOL 75 ML: 350 INJECTION, SOLUTION INTRAVENOUS at 13:09

## 2021-06-02 RX ADMIN — METHYLPREDNISOLONE SODIUM SUCCINATE 125 MG: 125 INJECTION, POWDER, FOR SOLUTION INTRAMUSCULAR; INTRAVENOUS at 11:45

## 2021-06-08 DIAGNOSIS — Z01.812 PRE-PROCEDURE LAB EXAM: Primary | ICD-10-CM

## 2021-06-14 ENCOUNTER — HOSPITAL ENCOUNTER (OUTPATIENT)
Dept: CARDIOLOGY | Age: 86
Discharge: HOME | DRG: 266 | End: 2021-06-14
Attending: INTERNAL MEDICINE

## 2021-06-14 ENCOUNTER — ANESTHESIA EVENT (OUTPATIENT)
Dept: CARDIOLOGY | Age: 86
DRG: 266 | End: 2021-06-14

## 2021-06-14 ENCOUNTER — ANESTHESIA (OUTPATIENT)
Dept: CARDIOLOGY | Age: 86
DRG: 266 | End: 2021-06-14

## 2021-06-14 ENCOUNTER — APPOINTMENT (OUTPATIENT)
Dept: GENERAL RADIOLOGY | Age: 86
DRG: 266 | End: 2021-06-14
Attending: HOSPITALIST

## 2021-06-14 ENCOUNTER — APPOINTMENT (OUTPATIENT)
Dept: CARDIOLOGY | Age: 86
DRG: 266 | End: 2021-06-14
Attending: NURSE PRACTITIONER

## 2021-06-14 ENCOUNTER — HOSPITAL ENCOUNTER (INPATIENT)
Age: 86
LOS: 24 days | Discharge: HOSPICE | DRG: 266 | End: 2021-07-08
Attending: INTERNAL MEDICINE | Admitting: INTERNAL MEDICINE

## 2021-06-14 DIAGNOSIS — I35.0 SEVERE AORTIC STENOSIS: ICD-10-CM

## 2021-06-14 DIAGNOSIS — K22.3 ESOPHAGEAL PERFORATION: Primary | ICD-10-CM

## 2021-06-14 DIAGNOSIS — Z00.6 ENCOUNTER FOR EXAMINATION FOR NORMAL COMPARISON OR CONTROL IN CLINICAL RESEARCH PROGRAM: ICD-10-CM

## 2021-06-14 LAB
ACT BLD: 138 BASELINE/TARGET RANGES ARE SET BY CLINICIANS FOR EACH PATIENT/PROCEDURE
ACT BLD: 248 BASELINE/TARGET RANGES ARE SET BY CLINICIANS FOR EACH PATIENT/PROCEDURE
ATRIAL RATE (BPM): 94
ATRIAL RATE (BPM): 94
BLOOD EXPIRATION DATE: NORMAL
BLOOD EXPIRATION DATE: NORMAL
CROSSMATCH RESULT: NORMAL
CROSSMATCH RESULT: NORMAL
DISPENSE STATUS: NORMAL
DISPENSE STATUS: NORMAL
GLUCOSE BLDC GLUCOMTR-MCNC: 154 MG/DL (ref 70–99)
GLUCOSE BLDC GLUCOMTR-MCNC: 159 MG/DL (ref 70–99)
GLUCOSE BLDC GLUCOMTR-MCNC: 165 MG/DL (ref 70–99)
GLUCOSE BLDC GLUCOMTR-MCNC: 194 MG/DL (ref 70–99)
ISBT BLOOD TYPE: 1700
ISBT BLOOD TYPE: 1700
ISSUE DATE/TIME: NORMAL
ISSUE DATE/TIME: NORMAL
P AXIS (DEGREES): 71
P AXIS (DEGREES): 74
PR-INTERVAL (MSEC): 192
PR-INTERVAL (MSEC): 192
PRODUCT CODE: NORMAL
PRODUCT CODE: NORMAL
PRODUCT DESCRIPTION: NORMAL
PRODUCT DESCRIPTION: NORMAL
PRODUCT ID: NORMAL
PRODUCT ID: NORMAL
QRS-INTERVAL (MSEC): 100
QRS-INTERVAL (MSEC): 140
QT-INTERVAL (MSEC): 380
QT-INTERVAL (MSEC): 422
QTC: 475
QTC: 528
R AXIS (DEGREES): -54
R AXIS (DEGREES): 15
REPORT TEXT: NORMAL
REPORT TEXT: NORMAL
T AXIS (DEGREES): -3
T AXIS (DEGREES): 129
UNIT BLOOD TYPE: NORMAL
UNIT BLOOD TYPE: NORMAL
UNIT NUMBER: NORMAL
UNIT NUMBER: NORMAL
VENTRICULAR RATE EKG/MIN (BPM): 94
VENTRICULAR RATE EKG/MIN (BPM): 94

## 2021-06-14 PROCEDURE — 10002801 HB RX 250 W/O HCPCS: Performed by: NURSE PRACTITIONER

## 2021-06-14 PROCEDURE — 93308 TTE F-UP OR LMTD: CPT

## 2021-06-14 PROCEDURE — 10004651 HB RX, NO CHARGE ITEM: Performed by: NURSE PRACTITIONER

## 2021-06-14 PROCEDURE — 85347 COAGULATION TIME ACTIVATED: CPT | Performed by: INTERNAL MEDICINE

## 2021-06-14 PROCEDURE — 10002801 HB RX 250 W/O HCPCS

## 2021-06-14 PROCEDURE — 10002807 HB RX 258: Performed by: SPECIALIST

## 2021-06-14 PROCEDURE — 10002803 HB RX 637: Performed by: HOSPITALIST

## 2021-06-14 PROCEDURE — 10003585 HB ROOM CHARGE INTERMEDIATE CARE

## 2021-06-14 PROCEDURE — 93005 ELECTROCARDIOGRAM TRACING: CPT | Performed by: NURSE PRACTITIONER

## 2021-06-14 PROCEDURE — 10004451 HB PACU RECOVERY 1ST 30 MINUTES: Performed by: INTERNAL MEDICINE

## 2021-06-14 PROCEDURE — 93355 ECHO TRANSESOPHAGEAL (TEE): CPT | Performed by: INTERNAL MEDICINE

## 2021-06-14 PROCEDURE — 71045 X-RAY EXAM CHEST 1 VIEW: CPT

## 2021-06-14 PROCEDURE — 10002800 HB RX 250 W HCPCS: Performed by: NURSE PRACTITIONER

## 2021-06-14 PROCEDURE — 10002801 HB RX 250 W/O HCPCS: Performed by: SPECIALIST

## 2021-06-14 PROCEDURE — 93355 ECHO TRANSESOPHAGEAL (TEE): CPT

## 2021-06-14 PROCEDURE — 10002803 HB RX 637: Performed by: NURSE PRACTITIONER

## 2021-06-14 PROCEDURE — C1894 INTRO/SHEATH, NON-LASER: HCPCS | Performed by: INTERNAL MEDICINE

## 2021-06-14 PROCEDURE — 10002800 HB RX 250 W HCPCS: Performed by: INTERNAL MEDICINE

## 2021-06-14 PROCEDURE — 10002800 HB RX 250 W HCPCS: Performed by: SPECIALIST

## 2021-06-14 PROCEDURE — 10002803 HB RX 637: Performed by: INTERNAL MEDICINE

## 2021-06-14 PROCEDURE — 86923 COMPATIBILITY TEST ELECTRIC: CPT

## 2021-06-14 PROCEDURE — 10006027 HB SUPPLY 278: Performed by: INTERNAL MEDICINE

## 2021-06-14 PROCEDURE — 93005 ELECTROCARDIOGRAM TRACING: CPT | Performed by: INTERNAL MEDICINE

## 2021-06-14 PROCEDURE — 93005 ELECTROCARDIOGRAM TRACING: CPT | Performed by: HOSPITALIST

## 2021-06-14 PROCEDURE — 10004452 HB PACU ADDL 30 MINUTES: Performed by: INTERNAL MEDICINE

## 2021-06-14 PROCEDURE — C1730 CATH, EP, 19 OR FEW ELECT: HCPCS | Performed by: INTERNAL MEDICINE

## 2021-06-14 PROCEDURE — 10002805 HB CONTRAST AGENT: Performed by: INTERNAL MEDICINE

## 2021-06-14 PROCEDURE — C1760 CLOSURE DEV, VASC: HCPCS | Performed by: INTERNAL MEDICINE

## 2021-06-14 PROCEDURE — 10002807 HB RX 258: Performed by: INTERNAL MEDICINE

## 2021-06-14 PROCEDURE — 10002800 HB RX 250 W HCPCS: Performed by: HOSPITALIST

## 2021-06-14 PROCEDURE — 10006023 HB SUPPLY 272: Performed by: INTERNAL MEDICINE

## 2021-06-14 PROCEDURE — 13000004 HB  ANESTHESIA  GENERAL OUTSIDE OR: Performed by: INTERNAL MEDICINE

## 2021-06-14 PROCEDURE — 33361 REPLACE AORTIC VALVE PERQ: CPT | Performed by: INTERNAL MEDICINE

## 2021-06-14 PROCEDURE — 10002807 HB RX 258: Performed by: NURSE PRACTITIONER

## 2021-06-14 PROCEDURE — 02RF38Z REPLACEMENT OF AORTIC VALVE WITH ZOOPLASTIC TISSUE, PERCUTANEOUS APPROACH: ICD-10-PCS | Performed by: INTERNAL MEDICINE

## 2021-06-14 PROCEDURE — 82962 GLUCOSE BLOOD TEST: CPT

## 2021-06-14 PROCEDURE — 10002801 HB RX 250 W/O HCPCS: Performed by: INTERNAL MEDICINE

## 2021-06-14 PROCEDURE — C1769 GUIDE WIRE: HCPCS | Performed by: INTERNAL MEDICINE

## 2021-06-14 DEVICE — ANGIO-SEAL VIP VASCULAR CLOSURE DEVICE
Type: IMPLANTABLE DEVICE | Site: GROIN | Status: FUNCTIONAL
Brand: ANGIO-SEAL

## 2021-06-14 DEVICE — PERCLOSE PROGLIDE™ SUTURE-MEDIATED CLOSURE SYSTEM
Type: IMPLANTABLE DEVICE | Site: GROIN | Status: FUNCTIONAL
Brand: PERCLOSE PROGLIDE™

## 2021-06-14 DEVICE — IMPLANTABLE DEVICE: Type: IMPLANTABLE DEVICE | Site: AORTIC VALVE | Status: FUNCTIONAL

## 2021-06-14 RX ORDER — SODIUM CHLORIDE 9 MG/ML
INJECTION, SOLUTION INTRAVENOUS CONTINUOUS PRN
Status: DISCONTINUED | OUTPATIENT
Start: 2021-06-14 | End: 2021-06-14

## 2021-06-14 RX ORDER — PREDNISONE 50 MG/1
50 TABLET ORAL SEE ADMIN INSTRUCTIONS
Status: ON HOLD | COMMUNITY
End: 2021-07-08 | Stop reason: HOSPADM

## 2021-06-14 RX ORDER — BUMETANIDE 0.5 MG/1
0.5 TABLET ORAL DAILY
Status: DISCONTINUED | OUTPATIENT
Start: 2021-06-14 | End: 2021-06-15

## 2021-06-14 RX ORDER — HEPARIN SODIUM 1000 [USP'U]/ML
INJECTION, SOLUTION INTRAVENOUS; SUBCUTANEOUS PRN
Status: DISCONTINUED | OUTPATIENT
Start: 2021-06-14 | End: 2021-06-14

## 2021-06-14 RX ORDER — POLYETHYLENE GLYCOL 3350 17 G/17G
17 POWDER, FOR SOLUTION ORAL 2 TIMES DAILY
Status: DISCONTINUED | OUTPATIENT
Start: 2021-06-18 | End: 2021-07-07

## 2021-06-14 RX ORDER — ONDANSETRON 2 MG/ML
INJECTION INTRAMUSCULAR; INTRAVENOUS PRN
Status: DISCONTINUED | OUTPATIENT
Start: 2021-06-14 | End: 2021-06-14

## 2021-06-14 RX ORDER — NICOTINE POLACRILEX 4 MG
30 LOZENGE BUCCAL PRN
Status: DISCONTINUED | OUTPATIENT
Start: 2021-06-14 | End: 2021-07-08 | Stop reason: HOSPADM

## 2021-06-14 RX ORDER — DILTIAZEM HYDROCHLORIDE 120 MG/1
120 CAPSULE, EXTENDED RELEASE ORAL DAILY
Status: DISCONTINUED | OUTPATIENT
Start: 2021-06-14 | End: 2021-06-15

## 2021-06-14 RX ORDER — MIRTAZAPINE 15 MG/1
7.5 TABLET, FILM COATED ORAL AT BEDTIME
Status: DISCONTINUED | OUTPATIENT
Start: 2021-06-14 | End: 2021-07-07

## 2021-06-14 RX ORDER — HYDRALAZINE HYDROCHLORIDE 20 MG/ML
5 INJECTION INTRAMUSCULAR; INTRAVENOUS
Status: DISCONTINUED | OUTPATIENT
Start: 2021-06-14 | End: 2021-06-14 | Stop reason: HOSPADM

## 2021-06-14 RX ORDER — PROTAMINE SULFATE 10 MG/ML
INJECTION, SOLUTION INTRAVENOUS PRN
Status: DISCONTINUED | OUTPATIENT
Start: 2021-06-14 | End: 2021-06-14

## 2021-06-14 RX ORDER — NEOSTIGMINE METHYLSULFATE 1 MG/ML
INJECTION, SOLUTION INTRAVENOUS PRN
Status: DISCONTINUED | OUTPATIENT
Start: 2021-06-14 | End: 2021-06-14

## 2021-06-14 RX ORDER — ACETAMINOPHEN 325 MG/1
650 TABLET ORAL EVERY 4 HOURS PRN
Status: DISCONTINUED | OUTPATIENT
Start: 2021-06-14 | End: 2021-06-23

## 2021-06-14 RX ORDER — POTASSIUM CHLORIDE 29.8 MG/ML
40 INJECTION INTRAVENOUS PRN
Status: DISCONTINUED | OUTPATIENT
Start: 2021-06-14 | End: 2021-06-14

## 2021-06-14 RX ORDER — TRAMADOL HYDROCHLORIDE 50 MG/1
25 TABLET ORAL EVERY 6 HOURS PRN
Status: DISCONTINUED | OUTPATIENT
Start: 2021-06-14 | End: 2021-06-16

## 2021-06-14 RX ORDER — HYDRALAZINE HYDROCHLORIDE 20 MG/ML
INJECTION INTRAMUSCULAR; INTRAVENOUS PRN
Status: DISCONTINUED | OUTPATIENT
Start: 2021-06-14 | End: 2021-06-14

## 2021-06-14 RX ORDER — HYDRALAZINE HYDROCHLORIDE 20 MG/ML
10 INJECTION INTRAMUSCULAR; INTRAVENOUS EVERY 4 HOURS PRN
Status: DISCONTINUED | OUTPATIENT
Start: 2021-06-14 | End: 2021-07-08 | Stop reason: HOSPADM

## 2021-06-14 RX ORDER — 0.9 % SODIUM CHLORIDE 0.9 %
2 VIAL (ML) INJECTION EVERY 12 HOURS SCHEDULED
Status: DISCONTINUED | OUTPATIENT
Start: 2021-06-14 | End: 2021-06-14 | Stop reason: HOSPADM

## 2021-06-14 RX ORDER — METOCLOPRAMIDE HYDROCHLORIDE 5 MG/ML
10 INJECTION INTRAMUSCULAR; INTRAVENOUS ONCE
Status: DISCONTINUED | OUTPATIENT
Start: 2021-06-14 | End: 2021-06-14 | Stop reason: HOSPADM

## 2021-06-14 RX ORDER — ESMOLOL HYDROCHLORIDE 10 MG/ML
INJECTION INTRAVENOUS PRN
Status: DISCONTINUED | OUTPATIENT
Start: 2021-06-14 | End: 2021-06-14

## 2021-06-14 RX ORDER — TRAMADOL HYDROCHLORIDE 50 MG/1
25 TABLET ORAL EVERY 6 HOURS PRN
Status: DISCONTINUED | OUTPATIENT
Start: 2021-06-14 | End: 2021-06-23

## 2021-06-14 RX ORDER — SODIUM CHLORIDE 9 MG/ML
INJECTION, SOLUTION INTRAVENOUS CONTINUOUS PRN
Status: DISCONTINUED | OUTPATIENT
Start: 2021-06-14 | End: 2021-06-19

## 2021-06-14 RX ORDER — DIPHENHYDRAMINE HCL 50 MG
50 CAPSULE ORAL ONCE
Status: ON HOLD | COMMUNITY
End: 2021-07-08 | Stop reason: HOSPADM

## 2021-06-14 RX ORDER — GLYCOPYRROLATE 0.2 MG/ML
INJECTION, SOLUTION INTRAMUSCULAR; INTRAVENOUS PRN
Status: DISCONTINUED | OUTPATIENT
Start: 2021-06-14 | End: 2021-06-14

## 2021-06-14 RX ORDER — ACETAMINOPHEN 325 MG/1
650 TABLET ORAL EVERY 4 HOURS PRN
Status: DISCONTINUED | OUTPATIENT
Start: 2021-06-14 | End: 2021-06-14

## 2021-06-14 RX ORDER — POTASSIUM CHLORIDE 29.8 MG/ML
20 INJECTION INTRAVENOUS PRN
Status: DISCONTINUED | OUTPATIENT
Start: 2021-06-14 | End: 2021-06-14

## 2021-06-14 RX ORDER — DEXTROSE MONOHYDRATE 25 G/50ML
25 INJECTION, SOLUTION INTRAVENOUS PRN
Status: DISCONTINUED | OUTPATIENT
Start: 2021-06-14 | End: 2021-07-08 | Stop reason: HOSPADM

## 2021-06-14 RX ORDER — DEXTROSE MONOHYDRATE 25 G/50ML
12.5 INJECTION, SOLUTION INTRAVENOUS PRN
Status: DISCONTINUED | OUTPATIENT
Start: 2021-06-14 | End: 2021-07-08 | Stop reason: HOSPADM

## 2021-06-14 RX ORDER — ASPIRIN 81 MG/1
81 TABLET, CHEWABLE ORAL DAILY
Status: DISCONTINUED | OUTPATIENT
Start: 2021-06-15 | End: 2021-07-07

## 2021-06-14 RX ORDER — TRIAMTERENE AND HYDROCHLOROTHIAZIDE 37.5; 25 MG/1; MG/1
1 TABLET ORAL DAILY
Status: DISCONTINUED | OUTPATIENT
Start: 2021-06-14 | End: 2021-06-16

## 2021-06-14 RX ORDER — BISACODYL 10 MG
10 SUPPOSITORY, RECTAL RECTAL ONCE
Status: DISCONTINUED | OUTPATIENT
Start: 2021-06-16 | End: 2021-06-16

## 2021-06-14 RX ORDER — ONDANSETRON 2 MG/ML
4 INJECTION INTRAMUSCULAR; INTRAVENOUS 2 TIMES DAILY PRN
Status: DISCONTINUED | OUTPATIENT
Start: 2021-06-14 | End: 2021-06-14 | Stop reason: HOSPADM

## 2021-06-14 RX ORDER — AMOXICILLIN 250 MG
2 CAPSULE ORAL DAILY
Status: DISCONTINUED | OUTPATIENT
Start: 2021-06-15 | End: 2021-07-07

## 2021-06-14 RX ORDER — ROCURONIUM BROMIDE 10 MG/ML
INJECTION, SOLUTION INTRAVENOUS PRN
Status: DISCONTINUED | OUTPATIENT
Start: 2021-06-14 | End: 2021-06-14

## 2021-06-14 RX ORDER — MIDAZOLAM HYDROCHLORIDE 1 MG/ML
INJECTION, SOLUTION INTRAMUSCULAR; INTRAVENOUS PRN
Status: DISCONTINUED | OUTPATIENT
Start: 2021-06-14 | End: 2021-06-14

## 2021-06-14 RX ORDER — NICOTINE POLACRILEX 4 MG
15 LOZENGE BUCCAL PRN
Status: DISCONTINUED | OUTPATIENT
Start: 2021-06-14 | End: 2021-07-08 | Stop reason: HOSPADM

## 2021-06-14 RX ADMIN — ACETAMINOPHEN 650 MG: 325 TABLET ORAL at 11:27

## 2021-06-14 RX ADMIN — INSULIN LISPRO 1 UNITS: 100 INJECTION, SOLUTION INTRAVENOUS; SUBCUTANEOUS at 17:36

## 2021-06-14 RX ADMIN — TRAMADOL HYDROCHLORIDE 25 MG: 50 TABLET, FILM COATED ORAL at 15:33

## 2021-06-14 RX ADMIN — DILTIAZEM HYDROCHLORIDE 120 MG: 120 CAPSULE, EXTENDED RELEASE ORAL at 11:27

## 2021-06-14 RX ADMIN — TRAMADOL HYDROCHLORIDE 25 MG: 50 TABLET, FILM COATED ORAL at 22:14

## 2021-06-14 RX ADMIN — GLYCOPYRROLATE 0.4 MG: 0.2 INJECTION, SOLUTION INTRAMUSCULAR; INTRAVENOUS at 08:47

## 2021-06-14 RX ADMIN — AZTREONAM 2 G: 2 INJECTION, POWDER, LYOPHILIZED, FOR SOLUTION INTRAMUSCULAR; INTRAVENOUS at 07:43

## 2021-06-14 RX ADMIN — HEPARIN SODIUM 2000 UNITS: 1000 INJECTION, SOLUTION INTRAVENOUS; SUBCUTANEOUS at 08:29

## 2021-06-14 RX ADMIN — HEPARIN SODIUM 5000 UNITS: 1000 INJECTION, SOLUTION INTRAVENOUS; SUBCUTANEOUS at 08:20

## 2021-06-14 RX ADMIN — VANCOMYCIN HYDROCHLORIDE 1000 MG: 1 INJECTION, POWDER, LYOPHILIZED, FOR SOLUTION INTRAVENOUS at 07:21

## 2021-06-14 RX ADMIN — FENTANYL CITRATE 50 MCG: 50 INJECTION, SOLUTION INTRAMUSCULAR; INTRAVENOUS at 07:34

## 2021-06-14 RX ADMIN — HYDRALAZINE HYDROCHLORIDE 5 MG: 20 INJECTION, SOLUTION INTRAMUSCULAR; INTRAVENOUS at 08:45

## 2021-06-14 RX ADMIN — BUMETANIDE 0.5 MG: 0.5 TABLET ORAL at 12:57

## 2021-06-14 RX ADMIN — SODIUM CHLORIDE: 9 INJECTION, SOLUTION INTRAVENOUS at 07:18

## 2021-06-14 RX ADMIN — MIDAZOLAM HYDROCHLORIDE 1 MG: 1 INJECTION, SOLUTION INTRAMUSCULAR; INTRAVENOUS at 07:10

## 2021-06-14 RX ADMIN — MIRTAZAPINE 7.5 MG: 15 TABLET, FILM COATED ORAL at 20:23

## 2021-06-14 RX ADMIN — AZTREONAM 2000 MG: 2 INJECTION, POWDER, LYOPHILIZED, FOR SOLUTION INTRAMUSCULAR; INTRAVENOUS at 13:03

## 2021-06-14 RX ADMIN — ROCURONIUM BROMIDE 30 MG: 50 INJECTION, SOLUTION INTRAVENOUS at 07:31

## 2021-06-14 RX ADMIN — APIXABAN 2.5 MG: 2.5 TABLET, FILM COATED ORAL at 11:27

## 2021-06-14 RX ADMIN — AZTREONAM 2000 MG: 2 INJECTION, POWDER, LYOPHILIZED, FOR SOLUTION INTRAMUSCULAR; INTRAVENOUS at 22:09

## 2021-06-14 RX ADMIN — APIXABAN 2.5 MG: 2.5 TABLET, FILM COATED ORAL at 20:23

## 2021-06-14 RX ADMIN — MIDAZOLAM HYDROCHLORIDE 1 MG: 1 INJECTION, SOLUTION INTRAMUSCULAR; INTRAVENOUS at 07:27

## 2021-06-14 RX ADMIN — NEOSTIGMINE METHYLSULFATE 2 MG: 1 INJECTION INTRAVENOUS at 08:47

## 2021-06-14 RX ADMIN — ACETAMINOPHEN 650 MG: 325 TABLET ORAL at 18:19

## 2021-06-14 RX ADMIN — PROTAMINE SULFATE 50 MG: 10 INJECTION, SOLUTION INTRAVENOUS at 08:38

## 2021-06-14 RX ADMIN — VANCOMYCIN HYDROCHLORIDE 1000 MG: 1 INJECTION, POWDER, LYOPHILIZED, FOR SOLUTION INTRAVENOUS at 20:23

## 2021-06-14 RX ADMIN — TRIAMTERENE AND HYDROCHLOROTHIAZIDE 1 TABLET: 37.5; 25 TABLET ORAL at 11:26

## 2021-06-14 RX ADMIN — ESMOLOL HYDROCHLORIDE 30 MG: 100 INJECTION, SOLUTION INTRAVENOUS at 08:05

## 2021-06-14 RX ADMIN — ONDANSETRON 4 MG: 2 INJECTION INTRAMUSCULAR; INTRAVENOUS at 08:44

## 2021-06-14 SDOH — SOCIAL STABILITY: SOCIAL INSECURITY: RISK FACTORS: HEART DISEASE

## 2021-06-14 SDOH — SOCIAL STABILITY: SOCIAL INSECURITY: RISK FACTORS: AGE

## 2021-06-14 ASSESSMENT — ACTIVITIES OF DAILY LIVING (ADL)
CHRONIC_PAIN_PRESENT: NO
MOBILITY_ASSIST_DEVICES: OTHER (COMMENT)
ADL_SHORT_OF_BREATH: NO
RECENT_DECLINE_ADL: NO
TRANSFERRING: INDEPENDENT
FEEDING YOURSELF: INDEPENDENT
TOILETING: INDEPENDENT
BATHING: INDEPENDENT
ADL_BEFORE_ADMISSION: NEEDS/REQUIRES ASSISTANCE
CHRONIC_PAIN_PRESENT: NO
ADL_SCORE: 12
CONTINENCE: INDEPENDENT
ADL_SHORT_OF_BREATH: NO
DRESSING YOURSELF: NEEDS ASSISTANCE
ADL_BEFORE_ADMISSION: INDEPENDENT
ADL_SCORE: 11

## 2021-06-14 ASSESSMENT — PAIN SCALES - GENERAL
PAINLEVEL_OUTOF10: 5
PAINLEVEL_OUTOF10: 0
PAINLEVEL_OUTOF10: 0
PAINLEVEL_OUTOF10: 7
PAINLEVEL_OUTOF10: 6
PAINLEVEL_OUTOF10: 0
PAINLEVEL_OUTOF10: 6
PAINLEVEL_OUTOF10: 10

## 2021-06-14 ASSESSMENT — COGNITIVE AND FUNCTIONAL STATUS - GENERAL
BECAUSE OF A PHYSICAL, MENTAL, OR EMOTIONAL CONDITION, DO YOU HAVE DIFFICULTY DOING ERRANDS ALONE: NO
DO YOU HAVE SERIOUS DIFFICULTY WALKING OR CLIMBING STAIRS: NO
DO YOU HAVE DIFFICULTY DRESSING OR BATHING: NO
BECAUSE OF A PHYSICAL, MENTAL, OR EMOTIONAL CONDITION, DO YOU HAVE SERIOUS DIFFICULTY CONCENTRATING, REMEMBERING OR MAKING DECISIONS: NO
ARE YOU BLIND OR DO YOU HAVE SERIOUS DIFFICULTY SEEING, EVEN WHEN WEARING GLASSES: NO
ARE YOU DEAF OR DO YOU HAVE SERIOUS DIFFICULTY  HEARING: NO
ARE YOU BLIND OR DO YOU HAVE SERIOUS DIFFICULTY SEEING, EVEN WHEN WEARING GLASSES: NO
ARE YOU DEAF OR DO YOU HAVE SERIOUS DIFFICULTY  HEARING: NO

## 2021-06-14 ASSESSMENT — LIFESTYLE VARIABLES
ALCOHOL_USE_STATUS: NO OR LOW RISK WITH VALIDATED TOOL
HOW MANY STANDARD DRINKS CONTAINING ALCOHOL DO YOU HAVE ON A TYPICAL DAY: 0,1 OR 2
HOW OFTEN DO YOU HAVE 6 OR MORE DRINKS ON ONE OCCASION: NEVER
HOW OFTEN DO YOU HAVE A DRINK CONTAINING ALCOHOL: NEVER
HOW MANY STANDARD DRINKS CONTAINING ALCOHOL DO YOU HAVE ON A TYPICAL DAY: 0,1 OR 2
ALCOHOL_USE_STATUS: NO OR LOW RISK WITH VALIDATED TOOL
AUDIT-C TOTAL SCORE: 0
HOW OFTEN DO YOU HAVE A DRINK CONTAINING ALCOHOL: NEVER
HOW OFTEN DO YOU HAVE 6 OR MORE DRINKS ON ONE OCCASION: NEVER
AUDIT-C TOTAL SCORE: 0

## 2021-06-15 ENCOUNTER — APPOINTMENT (OUTPATIENT)
Dept: GENERAL RADIOLOGY | Age: 86
DRG: 266 | End: 2021-06-15
Attending: HOSPITALIST

## 2021-06-15 ENCOUNTER — ADVANCED DIRECTIVES (OUTPATIENT)
Dept: HEALTH INFORMATION MANAGEMENT | Age: 86
End: 2021-06-15

## 2021-06-15 ENCOUNTER — APPOINTMENT (OUTPATIENT)
Dept: GENERAL RADIOLOGY | Age: 86
DRG: 266 | End: 2021-06-15
Attending: INTERNAL MEDICINE

## 2021-06-15 LAB
ANION GAP SERPL CALC-SCNC: 12 MMOL/L (ref 10–20)
ATRIAL RATE (BPM): 116
BUN SERPL-MCNC: 41 MG/DL (ref 6–20)
BUN/CREAT SERPL: 27 (ref 7–25)
CALCIUM SERPL-MCNC: 8.6 MG/DL (ref 8.4–10.2)
CHLORIDE SERPL-SCNC: 100 MMOL/L (ref 98–107)
CO2 SERPL-SCNC: 27 MMOL/L (ref 21–32)
CREAT SERPL-MCNC: 1.51 MG/DL (ref 0.51–0.95)
DEPRECATED RDW RBC: 51.9 FL (ref 39–50)
ERYTHROCYTE [DISTWIDTH] IN BLOOD: 14.7 % (ref 11–15)
FASTING DURATION TIME PATIENT: ABNORMAL H
GFR SERPLBLD BASED ON 1.73 SQ M-ARVRAT: 30 ML/MIN/1.73M2
GLUCOSE BLDC GLUCOMTR-MCNC: 139 MG/DL (ref 70–99)
GLUCOSE BLDC GLUCOMTR-MCNC: 153 MG/DL (ref 70–99)
GLUCOSE BLDC GLUCOMTR-MCNC: 157 MG/DL (ref 70–99)
GLUCOSE BLDC GLUCOMTR-MCNC: 200 MG/DL (ref 70–99)
GLUCOSE SERPL-MCNC: 164 MG/DL (ref 65–99)
HBA1C MFR BLD: 5.1 % (ref 4.5–5.6)
HCT VFR BLD CALC: 32.4 % (ref 36–46.5)
HGB BLD-MCNC: 10.6 G/DL (ref 12–15.5)
LACTATE BLDV-SCNC: 4.3 MMOL/L (ref 0–2)
MCH RBC QN AUTO: 31.7 PG (ref 26–34)
MCHC RBC AUTO-ENTMCNC: 32.7 G/DL (ref 32–36.5)
MCV RBC AUTO: 97 FL (ref 78–100)
NRBC BLD MANUAL-RTO: 0 /100 WBC
P AXIS (DEGREES): 52
PLATELET # BLD AUTO: 167 K/MCL (ref 140–450)
POTASSIUM SERPL-SCNC: 4.5 MMOL/L (ref 3.4–5.1)
PR-INTERVAL (MSEC): 158
PROCALCITONIN SERPL IA-MCNC: 2.55 NG/ML
QRS-INTERVAL (MSEC): 88
QT-INTERVAL (MSEC): 304
QTC: 422
RBC # BLD: 3.34 MIL/MCL (ref 4–5.2)
REPORT TEXT: NORMAL
SODIUM SERPL-SCNC: 134 MMOL/L (ref 135–145)
T AXIS (DEGREES): 84
VENTRICULAR RATE EKG/MIN (BPM): 116
WBC # BLD: 18 K/MCL (ref 4.2–11)

## 2021-06-15 PROCEDURE — 10002807 HB RX 258: Performed by: NURSE PRACTITIONER

## 2021-06-15 PROCEDURE — 10000008 HB ROOM CHARGE ICU OR CCU

## 2021-06-15 PROCEDURE — 85027 COMPLETE CBC AUTOMATED: CPT | Performed by: NURSE PRACTITIONER

## 2021-06-15 PROCEDURE — 10002803 HB RX 637: Performed by: INTERNAL MEDICINE

## 2021-06-15 PROCEDURE — 97535 SELF CARE MNGMENT TRAINING: CPT

## 2021-06-15 PROCEDURE — 10002801 HB RX 250 W/O HCPCS: Performed by: HOSPITALIST

## 2021-06-15 PROCEDURE — 97530 THERAPEUTIC ACTIVITIES: CPT

## 2021-06-15 PROCEDURE — 36415 COLL VENOUS BLD VENIPUNCTURE: CPT | Performed by: NURSE PRACTITIONER

## 2021-06-15 PROCEDURE — 93005 ELECTROCARDIOGRAM TRACING: CPT | Performed by: NURSE PRACTITIONER

## 2021-06-15 PROCEDURE — 10004651 HB RX, NO CHARGE ITEM: Performed by: NURSE PRACTITIONER

## 2021-06-15 PROCEDURE — 10002016 HB COUNTER INCENTIVE SPIROMETRY

## 2021-06-15 PROCEDURE — 71045 X-RAY EXAM CHEST 1 VIEW: CPT

## 2021-06-15 PROCEDURE — 10002800 HB RX 250 W HCPCS: Performed by: HOSPITALIST

## 2021-06-15 PROCEDURE — 97162 PT EVAL MOD COMPLEX 30 MIN: CPT

## 2021-06-15 PROCEDURE — 10002807 HB RX 258: Performed by: HOSPITALIST

## 2021-06-15 PROCEDURE — 10002801 HB RX 250 W/O HCPCS: Performed by: INTERNAL MEDICINE

## 2021-06-15 PROCEDURE — 13001086 HB INCENTIVE SPIROMETER W INSTRUCT

## 2021-06-15 PROCEDURE — 97166 OT EVAL MOD COMPLEX 45 MIN: CPT

## 2021-06-15 PROCEDURE — 80048 BASIC METABOLIC PNL TOTAL CA: CPT | Performed by: NURSE PRACTITIONER

## 2021-06-15 PROCEDURE — 83036 HEMOGLOBIN GLYCOSYLATED A1C: CPT | Performed by: HOSPITALIST

## 2021-06-15 PROCEDURE — 13003289 HB OXYGEN THERAPY DAILY

## 2021-06-15 PROCEDURE — 83605 ASSAY OF LACTIC ACID: CPT | Performed by: HOSPITALIST

## 2021-06-15 PROCEDURE — 84145 PROCALCITONIN (PCT): CPT | Performed by: HOSPITALIST

## 2021-06-15 PROCEDURE — 10002803 HB RX 637: Performed by: NURSE PRACTITIONER

## 2021-06-15 PROCEDURE — 10002803 HB RX 637: Performed by: HOSPITALIST

## 2021-06-15 RX ORDER — ALBUTEROL SULFATE 2.5 MG/3ML
2.5 SOLUTION RESPIRATORY (INHALATION)
Status: DISCONTINUED | OUTPATIENT
Start: 2021-06-15 | End: 2021-07-08 | Stop reason: HOSPADM

## 2021-06-15 RX ORDER — DILTIAZEM HYDROCHLORIDE 120 MG/1
240 CAPSULE, EXTENDED RELEASE ORAL DAILY
Status: DISCONTINUED | OUTPATIENT
Start: 2021-06-16 | End: 2021-06-16

## 2021-06-15 RX ORDER — DILTIAZEM HYDROCHLORIDE 120 MG/1
120 CAPSULE, EXTENDED RELEASE ORAL ONCE
Status: COMPLETED | OUTPATIENT
Start: 2021-06-15 | End: 2021-06-15

## 2021-06-15 RX ORDER — DILTIAZEM HYDROCHLORIDE 240 MG/1
240 CAPSULE, EXTENDED RELEASE ORAL DAILY
Qty: 30 CAPSULE | Refills: 0 | Status: SHIPPED | OUTPATIENT
Start: 2021-06-15

## 2021-06-15 RX ADMIN — Medication 10 MG/HR: at 19:24

## 2021-06-15 RX ADMIN — SODIUM CHLORIDE: 0.9 INJECTION, SOLUTION INTRAVENOUS at 06:17

## 2021-06-15 RX ADMIN — INSULIN LISPRO 2 UNITS: 100 INJECTION, SOLUTION INTRAVENOUS; SUBCUTANEOUS at 18:06

## 2021-06-15 RX ADMIN — APIXABAN 2.5 MG: 2.5 TABLET, FILM COATED ORAL at 20:15

## 2021-06-15 RX ADMIN — AZTREONAM 500 MG: 1 INJECTION, POWDER, LYOPHILIZED, FOR SOLUTION INTRAMUSCULAR; INTRAVENOUS at 18:32

## 2021-06-15 RX ADMIN — DILTIAZEM HYDROCHLORIDE 120 MG: 120 CAPSULE, EXTENDED RELEASE ORAL at 08:08

## 2021-06-15 RX ADMIN — DILTIAZEM HYDROCHLORIDE 120 MG: 120 CAPSULE, EXTENDED RELEASE ORAL at 12:30

## 2021-06-15 RX ADMIN — DOCUSATE SODIUM AND SENNOSIDES 2 TABLET: 8.6; 5 TABLET, FILM COATED ORAL at 08:08

## 2021-06-15 RX ADMIN — TRAMADOL HYDROCHLORIDE 25 MG: 50 TABLET, FILM COATED ORAL at 14:39

## 2021-06-15 RX ADMIN — APIXABAN 2.5 MG: 2.5 TABLET, FILM COATED ORAL at 08:08

## 2021-06-15 RX ADMIN — TRIAMTERENE AND HYDROCHLOROTHIAZIDE 1 TABLET: 37.5; 25 TABLET ORAL at 08:08

## 2021-06-15 RX ADMIN — BUMETANIDE 0.5 MG: 0.5 TABLET ORAL at 08:08

## 2021-06-15 RX ADMIN — MIRTAZAPINE 7.5 MG: 15 TABLET, FILM COATED ORAL at 20:16

## 2021-06-15 RX ADMIN — TRAMADOL HYDROCHLORIDE 25 MG: 50 TABLET, FILM COATED ORAL at 08:09

## 2021-06-15 RX ADMIN — INSULIN LISPRO 1 UNITS: 100 INJECTION, SOLUTION INTRAVENOUS; SUBCUTANEOUS at 08:09

## 2021-06-15 RX ADMIN — ASPIRIN 81 MG: 81 TABLET, CHEWABLE ORAL at 08:08

## 2021-06-15 ASSESSMENT — COGNITIVE AND FUNCTIONAL STATUS - GENERAL
BASIC_MOBILITY_CONVERTED_SCORE: 22.61
DAILY_ACTIVITY_CONVERTED_SCORE: 30.60
TAKING CARE OF COMPLICATED TASKS: A LOT
BECAUSE OF A PHYSICAL, MENTAL, OR EMOTIONAL CONDITION, DO YOU HAVE SERIOUS DIFFICULTY CONCENTRATING, REMEMBERING OR MAKING DECISIONS: NO
REMEMBERING TO TAKE MEDICATION: A LITTLE
REMEMBERING 5 ERRANDS WITH NO LIST: A LITTLE
HELP NEEDED DRESSING REGULAR UPPER BODY CLOTHING: A LOT
HELP NEEDED DRESSING REGULAR LOWER BODY CLOTHING: TOTAL
REMEMBERING WHERE THINGS ARE: A LITTLE
HELP NEEDED FOR PERSONAL GROOMING: A LITTLE
HELP NEEDED FOR BATHING: A LOT
HELP NEEDED FOR TOILETING: TOTAL
APPLIED_COGNITIVE_CONVERTED_SCORE: 39.77
BASIC_MOBILITY_RAW_SCORE: 8
DO YOU HAVE DIFFICULTY DRESSING OR BATHING: NO
DO YOU HAVE SERIOUS DIFFICULTY WALKING OR CLIMBING STAIRS: NO
DAILY_ACTIVITY_RAW_SCORE: 12
APPLIED_COGNITIVE_RAW_SCORE: 19

## 2021-06-15 ASSESSMENT — ACTIVITIES OF DAILY LIVING (ADL)
EATING: INDEPENDENT
HOME_MANAGEMENT_TIME_ENTRY: 23
GROOMING: INDEPENDENT
PRIOR_ADL_BATHING: MODIFIED INDEPENDENT
PRIOR_ADL: MODIFIED INDEPENDENT
PRIOR_ADL_TOILETING: MODIFIED INDEPENDENT

## 2021-06-15 ASSESSMENT — ENCOUNTER SYMPTOMS: PAIN SEVERITY NOW: 8

## 2021-06-15 ASSESSMENT — PAIN SCALES - GENERAL
PAINLEVEL_OUTOF10: 8
PAINLEVEL_OUTOF10: 7
PAINLEVEL_OUTOF10: 0
PAINLEVEL_OUTOF10: 5
PAINLEVEL_OUTOF10: 8

## 2021-06-16 ENCOUNTER — APPOINTMENT (OUTPATIENT)
Dept: CT IMAGING | Age: 86
DRG: 266 | End: 2021-06-16
Attending: INTERNAL MEDICINE

## 2021-06-16 ENCOUNTER — APPOINTMENT (OUTPATIENT)
Dept: GENERAL RADIOLOGY | Age: 86
DRG: 266 | End: 2021-06-16
Attending: INTERNAL MEDICINE

## 2021-06-16 ENCOUNTER — APPOINTMENT (OUTPATIENT)
Dept: ULTRASOUND IMAGING | Age: 86
DRG: 266 | End: 2021-06-16
Attending: INTERNAL MEDICINE

## 2021-06-16 ENCOUNTER — APPOINTMENT (OUTPATIENT)
Dept: GASTROENTEROLOGY | Age: 86
DRG: 266 | End: 2021-06-16
Attending: INTERNAL MEDICINE

## 2021-06-16 LAB
ALBUMIN SERPL-MCNC: 2.2 G/DL (ref 3.6–5.1)
ALBUMIN SERPL-MCNC: 2.6 G/DL (ref 3.6–5.1)
ALP SERPL-CCNC: 49 UNITS/L (ref 45–117)
ALT SERPL-CCNC: 20 UNITS/L
ANION GAP SERPL CALC-SCNC: 11 MMOL/L (ref 10–20)
ANION GAP SERPL CALC-SCNC: 16 MMOL/L (ref 10–20)
APPEARANCE UR: ABNORMAL
APTT PPP: 29 SEC (ref 22–30)
AST SERPL-CCNC: 23 UNITS/L
ATRIAL RATE (BPM): 170
BACTERIA #/AREA URNS HPF: ABNORMAL /HPF
BASE EXCESS / DEFICIT, ARTERIAL - RESPIRATORY: -3 MMOL/L (ref -2–3)
BDY SITE: ABNORMAL
BILIRUB CONJ SERPL-MCNC: 0.2 MG/DL (ref 0–0.2)
BILIRUB SERPL-MCNC: 0.4 MG/DL (ref 0.2–1)
BILIRUB UR QL STRIP: NEGATIVE
BODY TEMPERATURE: 36.8 DEGREES
BUN SERPL-MCNC: 53 MG/DL (ref 6–20)
BUN SERPL-MCNC: 57 MG/DL (ref 6–20)
BUN/CREAT SERPL: 28 (ref 7–25)
BUN/CREAT SERPL: 28 (ref 7–25)
CALCIUM SERPL-MCNC: 8.5 MG/DL (ref 8.4–10.2)
CALCIUM SERPL-MCNC: 8.8 MG/DL (ref 8.4–10.2)
CHLORIDE SERPL-SCNC: 103 MMOL/L (ref 98–107)
CHLORIDE SERPL-SCNC: 105 MMOL/L (ref 98–107)
CO2 SERPL-SCNC: 22 MMOL/L (ref 21–32)
CO2 SERPL-SCNC: 25 MMOL/L (ref 21–32)
COLOR UR: YELLOW
CONDITION: ABNORMAL
CREAT SERPL-MCNC: 1.92 MG/DL (ref 0.51–0.95)
CREAT SERPL-MCNC: 2.07 MG/DL (ref 0.51–0.95)
CREAT UR-MCNC: 111 MG/DL
DEPRECATED RDW RBC: 53.8 FL (ref 39–50)
ERYTHROCYTE [DISTWIDTH] IN BLOOD: 15.1 % (ref 11–15)
FASTING DURATION TIME PATIENT: ABNORMAL H
FASTING DURATION TIME PATIENT: ABNORMAL H
FIO2 ON VENT: 100 %
GFR SERPLBLD BASED ON 1.73 SQ M-ARVRAT: 20 ML/MIN/1.73M2
GFR SERPLBLD BASED ON 1.73 SQ M-ARVRAT: 22 ML/MIN/1.73M2
GLUCOSE BLDC GLUCOMTR-MCNC: 150 MG/DL (ref 70–99)
GLUCOSE BLDC GLUCOMTR-MCNC: 154 MG/DL (ref 70–99)
GLUCOSE SERPL-MCNC: 151 MG/DL (ref 65–99)
GLUCOSE SERPL-MCNC: 187 MG/DL (ref 65–99)
GLUCOSE UR STRIP-MCNC: NEGATIVE MG/DL
HCO3 BLDA-SCNC: 22 MMOL/L (ref 22–28)
HCT VFR BLD CALC: 33 % (ref 36–46.5)
HGB BLD-MCNC: 10.5 G/DL (ref 12–15.5)
HGB UR QL STRIP: ABNORMAL
HOROWITZ INDEX BLDA+IHG-RTO: 270 MM[HG] (ref 300–500)
HYALINE CASTS #/AREA URNS LPF: ABNORMAL /LPF
INR PPP: 1.2
KETONES UR STRIP-MCNC: NEGATIVE MG/DL
LACTATE BLDV-SCNC: 3.5 MMOL/L (ref 0–2)
LACTATE BLDV-SCNC: 3.7 MMOL/L (ref 0–2)
LACTATE BLDV-SCNC: 5.1 MMOL/L (ref 0–2)
LEUKOCYTE ESTERASE UR QL STRIP: ABNORMAL
MCH RBC QN AUTO: 31.1 PG (ref 26–34)
MCHC RBC AUTO-ENTMCNC: 31.8 G/DL (ref 32–36.5)
MCV RBC AUTO: 97.6 FL (ref 78–100)
MUCOUS THREADS URNS QL MICRO: PRESENT
NITRITE UR QL STRIP: NEGATIVE
NRBC BLD MANUAL-RTO: 0 /100 WBC
PCO2 BLDA: 37 MM HG (ref 32–45)
PH BLDA: 7.38 UNITS (ref 7.35–7.45)
PH UR STRIP: 5 [PH] (ref 5–7)
PHOSPHATE SERPL-MCNC: 4.6 MG/DL (ref 2.4–4.7)
PLATELET # BLD AUTO: 135 K/MCL (ref 140–450)
PO2 BLDA: 269 MM HG (ref 83–108)
POTASSIUM SERPL-SCNC: 4.2 MMOL/L (ref 3.4–5.1)
POTASSIUM SERPL-SCNC: 4.6 MMOL/L (ref 3.4–5.1)
PROT SERPL-MCNC: 5.9 G/DL (ref 6.4–8.2)
PROT UR STRIP-MCNC: 100 MG/DL
PROT UR-MCNC: 144 MG/DL
PROTHROMBIN TIME: 12.7 SEC (ref 9.7–11.8)
QRS-INTERVAL (MSEC): 86
QT-INTERVAL (MSEC): 256
QTC: 434
R AXIS (DEGREES): -8
RAINBOW EXTRA TUBES HOLD SPECIMEN: NORMAL
RBC # BLD: 3.38 MIL/MCL (ref 4–5.2)
RBC #/AREA URNS HPF: >100 /HPF
REPORT TEXT: NORMAL
SAO2 % BLDA: 100 % (ref 95–99)
SODIUM SERPL-SCNC: 136 MMOL/L (ref 135–145)
SODIUM SERPL-SCNC: 137 MMOL/L (ref 135–145)
SODIUM UR-SCNC: 9 MMOL/L
SP GR UR STRIP: 1.02 (ref 1–1.03)
SQUAMOUS #/AREA URNS HPF: ABNORMAL /HPF
T AXIS (DEGREES): 162
UROBILINOGEN UR STRIP-MCNC: 0.2 MG/DL
VANCOMYCIN SERPL-MCNC: 11 MCG/ML
VENTRICULAR RATE EKG/MIN (BPM): 173
WBC # BLD: 11.6 K/MCL (ref 4.2–11)
WBC #/AREA URNS HPF: ABNORMAL /HPF

## 2021-06-16 PROCEDURE — 87206 SMEAR FLUORESCENT/ACID STAI: CPT | Performed by: INTERNAL MEDICINE

## 2021-06-16 PROCEDURE — 83605 ASSAY OF LACTIC ACID: CPT | Performed by: INTERNAL MEDICINE

## 2021-06-16 PROCEDURE — 82570 ASSAY OF URINE CREATININE: CPT | Performed by: INTERNAL MEDICINE

## 2021-06-16 PROCEDURE — 76775 US EXAM ABDO BACK WALL LIM: CPT

## 2021-06-16 PROCEDURE — 81001 URINALYSIS AUTO W/SCOPE: CPT | Performed by: INTERNAL MEDICINE

## 2021-06-16 PROCEDURE — 70490 CT SOFT TISSUE NECK W/O DYE: CPT

## 2021-06-16 PROCEDURE — 84156 ASSAY OF PROTEIN URINE: CPT | Performed by: INTERNAL MEDICINE

## 2021-06-16 PROCEDURE — 10002801 HB RX 250 W/O HCPCS

## 2021-06-16 PROCEDURE — 5A1945Z RESPIRATORY VENTILATION, 24-96 CONSECUTIVE HOURS: ICD-10-PCS | Performed by: INTERNAL MEDICINE

## 2021-06-16 PROCEDURE — 10002800 HB RX 250 W HCPCS: Performed by: INTERNAL MEDICINE

## 2021-06-16 PROCEDURE — 10002801 HB RX 250 W/O HCPCS: Performed by: NURSE PRACTITIONER

## 2021-06-16 PROCEDURE — 10002807 HB RX 258: Performed by: NURSE PRACTITIONER

## 2021-06-16 PROCEDURE — 85610 PROTHROMBIN TIME: CPT | Performed by: INTERNAL MEDICINE

## 2021-06-16 PROCEDURE — 10004281 HB COUNTER-STAFF TIME PER 15 MIN

## 2021-06-16 PROCEDURE — 94640 AIRWAY INHALATION TREATMENT: CPT

## 2021-06-16 PROCEDURE — 80076 HEPATIC FUNCTION PANEL: CPT | Performed by: INTERNAL MEDICINE

## 2021-06-16 PROCEDURE — 71045 X-RAY EXAM CHEST 1 VIEW: CPT

## 2021-06-16 PROCEDURE — 94668 MNPJ CHEST WALL SBSQ: CPT

## 2021-06-16 PROCEDURE — 36415 COLL VENOUS BLD VENIPUNCTURE: CPT | Performed by: INTERNAL MEDICINE

## 2021-06-16 PROCEDURE — 13003292 HB HHF OXYGEN THERAPY DAILY

## 2021-06-16 PROCEDURE — 10002801 HB RX 250 W/O HCPCS: Performed by: INTERNAL MEDICINE

## 2021-06-16 PROCEDURE — 10002803 HB RX 637: Performed by: NURSE PRACTITIONER

## 2021-06-16 PROCEDURE — 10000008 HB ROOM CHARGE ICU OR CCU

## 2021-06-16 PROCEDURE — 10002800 HB RX 250 W HCPCS: Performed by: NURSE PRACTITIONER

## 2021-06-16 PROCEDURE — 84300 ASSAY OF URINE SODIUM: CPT | Performed by: INTERNAL MEDICINE

## 2021-06-16 PROCEDURE — 10002801 HB RX 250 W/O HCPCS: Performed by: HOSPITALIST

## 2021-06-16 PROCEDURE — 99233 SBSQ HOSP IP/OBS HIGH 50: CPT | Performed by: INTERNAL MEDICINE

## 2021-06-16 PROCEDURE — 94002 VENT MGMT INPAT INIT DAY: CPT

## 2021-06-16 PROCEDURE — 80048 BASIC METABOLIC PNL TOTAL CA: CPT | Performed by: HOSPITALIST

## 2021-06-16 PROCEDURE — 10002807 HB RX 258: Performed by: INTERNAL MEDICINE

## 2021-06-16 PROCEDURE — 13000032 HB GI LAB AT BEDSIDE

## 2021-06-16 PROCEDURE — 71250 CT THORAX DX C-: CPT

## 2021-06-16 PROCEDURE — 10002807 HB RX 258: Performed by: HOSPITALIST

## 2021-06-16 PROCEDURE — 93005 ELECTROCARDIOGRAM TRACING: CPT | Performed by: NURSE PRACTITIONER

## 2021-06-16 PROCEDURE — 0BJ08ZZ INSPECTION OF TRACHEOBRONCHIAL TREE, VIA NATURAL OR ARTIFICIAL OPENING ENDOSCOPIC: ICD-10-PCS | Performed by: INTERNAL MEDICINE

## 2021-06-16 PROCEDURE — 36600 WITHDRAWAL OF ARTERIAL BLOOD: CPT

## 2021-06-16 PROCEDURE — 10004651 HB RX, NO CHARGE ITEM: Performed by: NURSE PRACTITIONER

## 2021-06-16 PROCEDURE — 13003289 HB OXYGEN THERAPY DAILY

## 2021-06-16 PROCEDURE — 74220 X-RAY XM ESOPHAGUS 1CNTRST: CPT

## 2021-06-16 PROCEDURE — 10002800 HB RX 250 W HCPCS

## 2021-06-16 PROCEDURE — 85027 COMPLETE CBC AUTOMATED: CPT | Performed by: INTERNAL MEDICINE

## 2021-06-16 PROCEDURE — 87040 BLOOD CULTURE FOR BACTERIA: CPT | Performed by: HOSPITALIST

## 2021-06-16 PROCEDURE — 80202 ASSAY OF VANCOMYCIN: CPT | Performed by: NURSE PRACTITIONER

## 2021-06-16 PROCEDURE — 82803 BLOOD GASES ANY COMBINATION: CPT

## 2021-06-16 PROCEDURE — 80069 RENAL FUNCTION PANEL: CPT | Performed by: INTERNAL MEDICINE

## 2021-06-16 PROCEDURE — 85730 THROMBOPLASTIN TIME PARTIAL: CPT | Performed by: INTERNAL MEDICINE

## 2021-06-16 PROCEDURE — 0BH18EZ INSERTION OF ENDOTRACHEAL AIRWAY INTO TRACHEA, VIA NATURAL OR ARTIFICIAL OPENING ENDOSCOPIC: ICD-10-PCS | Performed by: INTERNAL MEDICINE

## 2021-06-16 PROCEDURE — 84540 ASSAY OF URINE/UREA-N: CPT | Performed by: INTERNAL MEDICINE

## 2021-06-16 PROCEDURE — 10002803 HB RX 637: Performed by: HOSPITALIST

## 2021-06-16 RX ORDER — BISACODYL 10 MG
10 SUPPOSITORY, RECTAL RECTAL DAILY PRN
Status: DISCONTINUED | OUTPATIENT
Start: 2021-06-16 | End: 2021-07-08 | Stop reason: HOSPADM

## 2021-06-16 RX ORDER — NOREPINEPHRINE BITARTRATE 0.03 MG/ML
INJECTION, SOLUTION INTRAVENOUS
Status: COMPLETED
Start: 2021-06-16 | End: 2021-06-16

## 2021-06-16 RX ORDER — METHYLPREDNISOLONE SODIUM SUCCINATE 125 MG/2ML
INJECTION, POWDER, LYOPHILIZED, FOR SOLUTION INTRAMUSCULAR; INTRAVENOUS
Status: COMPLETED
Start: 2021-06-16 | End: 2021-06-16

## 2021-06-16 RX ORDER — HEPARIN SODIUM 1000 [USP'U]/ML
30 INJECTION, SOLUTION INTRAVENOUS; SUBCUTANEOUS PRN
Status: DISCONTINUED | OUTPATIENT
Start: 2021-06-16 | End: 2021-06-16

## 2021-06-16 RX ORDER — CHLORHEXIDINE GLUCONATE ORAL RINSE 1.2 MG/ML
15 SOLUTION DENTAL EVERY 12 HOURS SCHEDULED
Status: DISCONTINUED | OUTPATIENT
Start: 2021-06-16 | End: 2021-06-19

## 2021-06-16 RX ORDER — ROCURONIUM BROMIDE 10 MG/ML
0.6 INJECTION, SOLUTION INTRAVENOUS ONCE
Status: COMPLETED | OUTPATIENT
Start: 2021-06-16 | End: 2021-06-16

## 2021-06-16 RX ORDER — FAMOTIDINE 10 MG/ML
20 INJECTION, SOLUTION INTRAVENOUS DAILY
Status: DISCONTINUED | OUTPATIENT
Start: 2021-06-16 | End: 2021-06-21

## 2021-06-16 RX ORDER — HEPARIN SODIUM 1000 [USP'U]/ML
60 INJECTION, SOLUTION INTRAVENOUS; SUBCUTANEOUS PRN
Status: DISCONTINUED | OUTPATIENT
Start: 2021-06-16 | End: 2021-06-16

## 2021-06-16 RX ORDER — SODIUM CHLORIDE, SODIUM LACTATE, POTASSIUM CHLORIDE, CALCIUM CHLORIDE 600; 310; 30; 20 MG/100ML; MG/100ML; MG/100ML; MG/100ML
INJECTION, SOLUTION INTRAVENOUS CONTINUOUS
Status: DISCONTINUED | OUTPATIENT
Start: 2021-06-16 | End: 2021-06-18

## 2021-06-16 RX ORDER — MAGNESIUM HYDROXIDE 1200 MG/15ML
5 LIQUID ORAL ONCE
Status: COMPLETED | OUTPATIENT
Start: 2021-06-16 | End: 2021-06-16

## 2021-06-16 RX ORDER — IPRATROPIUM BROMIDE AND ALBUTEROL SULFATE 2.5; .5 MG/3ML; MG/3ML
3 SOLUTION RESPIRATORY (INHALATION)
Status: DISCONTINUED | OUTPATIENT
Start: 2021-06-16 | End: 2021-06-18

## 2021-06-16 RX ORDER — LIDOCAINE HYDROCHLORIDE 20 MG/ML
2 INJECTION, SOLUTION INFILTRATION; PERINEURAL ONCE
Status: COMPLETED | OUTPATIENT
Start: 2021-06-16 | End: 2021-06-16

## 2021-06-16 RX ORDER — LIDOCAINE HYDROCHLORIDE 20 MG/ML
JELLY TOPICAL PRN
Status: COMPLETED | OUTPATIENT
Start: 2021-06-16 | End: 2021-06-16

## 2021-06-16 RX ORDER — MIDAZOLAM HYDROCHLORIDE 2 MG/2ML
INJECTION, SOLUTION INTRAMUSCULAR; INTRAVENOUS
Status: COMPLETED
Start: 2021-06-16 | End: 2021-06-16

## 2021-06-16 RX ORDER — MINERAL OIL AND WHITE PETROLATUM 150; 830 MG/G; MG/G
1 OINTMENT OPHTHALMIC AT BEDTIME
Status: DISCONTINUED | OUTPATIENT
Start: 2021-06-16 | End: 2021-06-19

## 2021-06-16 RX ORDER — METHYLPREDNISOLONE SODIUM SUCCINATE 40 MG/ML
80 INJECTION, POWDER, LYOPHILIZED, FOR SOLUTION INTRAMUSCULAR; INTRAVENOUS EVERY 8 HOURS SCHEDULED
Status: COMPLETED | OUTPATIENT
Start: 2021-06-16 | End: 2021-06-17

## 2021-06-16 RX ORDER — HEPARIN SODIUM 10000 [USP'U]/100ML
0-30 INJECTION, SOLUTION INTRAVENOUS CONTINUOUS
Status: DISCONTINUED | OUTPATIENT
Start: 2021-06-16 | End: 2021-06-16

## 2021-06-16 RX ORDER — NOREPINEPHRINE BITARTRATE 0.03 MG/ML
0-100 INJECTION, SOLUTION INTRAVENOUS CONTINUOUS
Status: DISCONTINUED | OUTPATIENT
Start: 2021-06-16 | End: 2021-06-19

## 2021-06-16 RX ORDER — HEPARIN SODIUM 1000 [USP'U]/ML
60 INJECTION, SOLUTION INTRAVENOUS; SUBCUTANEOUS ONCE
Status: COMPLETED | OUTPATIENT
Start: 2021-06-16 | End: 2021-06-16

## 2021-06-16 RX ORDER — MIDAZOLAM HYDROCHLORIDE 1 MG/ML
2 INJECTION, SOLUTION INTRAMUSCULAR; INTRAVENOUS ONCE
Status: COMPLETED | OUTPATIENT
Start: 2021-06-16 | End: 2021-06-16

## 2021-06-16 RX ADMIN — AMIODARONE HYDROCHLORIDE 150 MG: 1.5 INJECTION, SOLUTION INTRAVENOUS at 00:14

## 2021-06-16 RX ADMIN — AMIODARONE HYDROCHLORIDE 0.5 MG/MIN: 50 INJECTION, SOLUTION INTRAVENOUS at 08:58

## 2021-06-16 RX ADMIN — METHYLPREDNISOLONE SODIUM SUCCINATE 80 MG: 125 INJECTION, POWDER, FOR SOLUTION INTRAMUSCULAR; INTRAVENOUS at 18:39

## 2021-06-16 RX ADMIN — IPRATROPIUM BROMIDE AND ALBUTEROL SULFATE 3 ML: 2.5; .5 SOLUTION RESPIRATORY (INHALATION) at 05:20

## 2021-06-16 RX ADMIN — IPRATROPIUM BROMIDE AND ALBUTEROL SULFATE 3 ML: 2.5; .5 SOLUTION RESPIRATORY (INHALATION) at 16:20

## 2021-06-16 RX ADMIN — IPRATROPIUM BROMIDE AND ALBUTEROL SULFATE 3 ML: 2.5; .5 SOLUTION RESPIRATORY (INHALATION) at 12:20

## 2021-06-16 RX ADMIN — VANCOMYCIN HYDROCHLORIDE 1000 MG: 1 INJECTION, POWDER, LYOPHILIZED, FOR SOLUTION INTRAVENOUS at 20:57

## 2021-06-16 RX ADMIN — AMIODARONE HYDROCHLORIDE 1 MG/MIN: 50 INJECTION, SOLUTION INTRAVENOUS at 00:30

## 2021-06-16 RX ADMIN — SODIUM CHLORIDE 500 ML: 0.9 INJECTION, SOLUTION INTRAVENOUS at 09:24

## 2021-06-16 RX ADMIN — METRONIDAZOLE 500 MG: 500 INJECTION, SOLUTION INTRAVENOUS at 09:02

## 2021-06-16 RX ADMIN — IPRATROPIUM BROMIDE AND ALBUTEROL SULFATE 3 ML: 2.5; .5 SOLUTION RESPIRATORY (INHALATION) at 08:05

## 2021-06-16 RX ADMIN — PROPOFOL 20 MCG/KG/MIN: 10 INJECTION, EMULSION INTRAVENOUS at 16:48

## 2021-06-16 RX ADMIN — AZTREONAM 500 MG: 1 INJECTION, POWDER, LYOPHILIZED, FOR SOLUTION INTRAMUSCULAR; INTRAVENOUS at 05:03

## 2021-06-16 RX ADMIN — Medication 10 MCG/MIN: at 06:25

## 2021-06-16 RX ADMIN — Medication 25 MCG/HR: at 13:05

## 2021-06-16 RX ADMIN — LIDOCAINE HYDROCHLORIDE 40 MG: 20 INJECTION, SOLUTION INFILTRATION; PERINEURAL at 10:54

## 2021-06-16 RX ADMIN — MIRTAZAPINE 7.5 MG: 15 TABLET, FILM COATED ORAL at 21:00

## 2021-06-16 RX ADMIN — FAMOTIDINE 20 MG: 10 INJECTION INTRAVENOUS at 12:00

## 2021-06-16 RX ADMIN — INSULIN LISPRO 1 UNITS: 100 INJECTION, SOLUTION INTRAVENOUS; SUBCUTANEOUS at 18:50

## 2021-06-16 RX ADMIN — ROCURONIUM BROMIDE 29 MG: 10 INJECTION INTRAVENOUS at 10:50

## 2021-06-16 RX ADMIN — PROPOFOL 20 MCG/KG/MIN: 10 INJECTION, EMULSION INTRAVENOUS at 07:17

## 2021-06-16 RX ADMIN — MIDAZOLAM HYDROCHLORIDE 2 MG: 1 INJECTION, SOLUTION INTRAMUSCULAR; INTRAVENOUS at 07:18

## 2021-06-16 RX ADMIN — METRONIDAZOLE 500 MG: 500 INJECTION, SOLUTION INTRAVENOUS at 02:26

## 2021-06-16 RX ADMIN — SODIUM CHLORIDE, POTASSIUM CHLORIDE, SODIUM LACTATE AND CALCIUM CHLORIDE 1000 ML: 600; 310; 30; 20 INJECTION, SOLUTION INTRAVENOUS at 16:02

## 2021-06-16 RX ADMIN — MEROPENEM 1 G: 1 INJECTION INTRAVENOUS at 23:16

## 2021-06-16 RX ADMIN — DOCUSATE SODIUM AND SENNOSIDES 2 TABLET: 8.6; 5 TABLET, FILM COATED ORAL at 09:30

## 2021-06-16 RX ADMIN — METHYLPREDNISOLONE SODIUM SUCCINATE 80 MG: 40 INJECTION, POWDER, FOR SOLUTION INTRAMUSCULAR; INTRAVENOUS at 22:00

## 2021-06-16 RX ADMIN — IPRATROPIUM BROMIDE AND ALBUTEROL SULFATE 3 ML: 2.5; .5 SOLUTION RESPIRATORY (INHALATION) at 21:20

## 2021-06-16 RX ADMIN — Medication 15 MG/HR: at 03:30

## 2021-06-16 RX ADMIN — CHLORHEXIDINE GLUCONATE 15 ML: 1.2 RINSE ORAL at 11:07

## 2021-06-16 RX ADMIN — MAGNESIUM HYDROXIDE 5 ML: 1200 LIQUID ORAL at 10:54

## 2021-06-16 RX ADMIN — CHLORHEXIDINE GLUCONATE 15 ML: 1.2 RINSE ORAL at 20:58

## 2021-06-16 RX ADMIN — MEROPENEM 1 G: 1 INJECTION INTRAVENOUS at 14:10

## 2021-06-16 RX ADMIN — HEPARIN SODIUM 2800 UNITS: 1000 INJECTION, SOLUTION INTRAVENOUS; SUBCUTANEOUS at 09:16

## 2021-06-16 RX ADMIN — HEPARIN SODIUM 12 UNITS/KG/HR: 10000 INJECTION, SOLUTION INTRAVENOUS at 09:17

## 2021-06-16 RX ADMIN — AMIODARONE HYDROCHLORIDE 150 MG: 1.5 INJECTION, SOLUTION INTRAVENOUS at 22:55

## 2021-06-16 RX ADMIN — INSULIN LISPRO 1 UNITS: 100 INJECTION, SOLUTION INTRAVENOUS; SUBCUTANEOUS at 12:00

## 2021-06-16 RX ADMIN — SODIUM CHLORIDE, POTASSIUM CHLORIDE, SODIUM LACTATE AND CALCIUM CHLORIDE: 600; 310; 30; 20 INJECTION, SOLUTION INTRAVENOUS at 11:11

## 2021-06-16 ASSESSMENT — PAIN SCALES - WONG BAKER
WONGBAKER_NUMERICALRESPONSE: 0

## 2021-06-16 ASSESSMENT — PAIN SCALES - BEHAVIORAL PAIN SCALE (BPS)
BPS_SCORE: 3

## 2021-06-16 ASSESSMENT — PAIN SCALES - GENERAL
PAINLEVEL_OUTOF10: 0

## 2021-06-17 ENCOUNTER — APPOINTMENT (OUTPATIENT)
Dept: INTERVENTIONAL RADIOLOGY/VASCULAR | Age: 86
DRG: 266 | End: 2021-06-17
Attending: INTERNAL MEDICINE

## 2021-06-17 ENCOUNTER — APPOINTMENT (OUTPATIENT)
Dept: GASTROENTEROLOGY | Age: 86
DRG: 266 | End: 2021-06-17
Attending: INTERNAL MEDICINE

## 2021-06-17 ENCOUNTER — ANESTHESIA EVENT (OUTPATIENT)
Dept: GASTROENTEROLOGY | Age: 86
DRG: 266 | End: 2021-06-17

## 2021-06-17 ENCOUNTER — ANESTHESIA (OUTPATIENT)
Dept: GASTROENTEROLOGY | Age: 86
DRG: 266 | End: 2021-06-17

## 2021-06-17 LAB
ALBUMIN SERPL-MCNC: 2 G/DL (ref 3.6–5.1)
ALBUMIN/GLOB SERPL: 0.8 {RATIO} (ref 1–2.4)
ALP SERPL-CCNC: 40 UNITS/L (ref 45–117)
ALT SERPL-CCNC: 611 UNITS/L
ANION GAP SERPL CALC-SCNC: 13 MMOL/L (ref 10–20)
AST SERPL-CCNC: 551 UNITS/L
ATRIAL RATE (BPM): 105
ATRIAL RATE (BPM): 117
BILIRUB SERPL-MCNC: 0.5 MG/DL (ref 0.2–1)
BUN SERPL-MCNC: 57 MG/DL (ref 6–20)
BUN/CREAT SERPL: 29 (ref 7–25)
CALCIUM SERPL-MCNC: 8 MG/DL (ref 8.4–10.2)
CHLORIDE SERPL-SCNC: 105 MMOL/L (ref 98–107)
CO2 SERPL-SCNC: 22 MMOL/L (ref 21–32)
CREAT SERPL-MCNC: 1.94 MG/DL (ref 0.51–0.95)
FASTING DURATION TIME PATIENT: ABNORMAL H
GFR SERPLBLD BASED ON 1.73 SQ M-ARVRAT: 22 ML/MIN/1.73M2
GLOBULIN SER-MCNC: 2.6 G/DL (ref 2–4)
GLUCOSE BLDC GLUCOMTR-MCNC: 140 MG/DL (ref 70–99)
GLUCOSE BLDC GLUCOMTR-MCNC: 154 MG/DL (ref 70–99)
GLUCOSE BLDC GLUCOMTR-MCNC: 180 MG/DL (ref 70–99)
GLUCOSE SERPL-MCNC: 169 MG/DL (ref 65–99)
MAGNESIUM SERPL-MCNC: 2.6 MG/DL (ref 1.7–2.4)
PHOSPHATE SERPL-MCNC: 4.4 MG/DL (ref 2.4–4.7)
POTASSIUM SERPL-SCNC: 4.1 MMOL/L (ref 3.4–5.1)
PROT SERPL-MCNC: 4.6 G/DL (ref 6.4–8.2)
QRS-INTERVAL (MSEC): 90
QRS-INTERVAL (MSEC): 96
QT-INTERVAL (MSEC): 304
QT-INTERVAL (MSEC): 306
QTC: 415
QTC: 434
R AXIS (DEGREES): -3
R AXIS (DEGREES): 36
RAINBOW EXTRA TUBES HOLD SPECIMEN: NORMAL
REPORT TEXT: NORMAL
REPORT TEXT: NORMAL
SARS-COV-2 RNA RESP QL NAA+PROBE: NOT DETECTED
SERVICE CMNT-IMP: NORMAL
SERVICE CMNT-IMP: NORMAL
SODIUM SERPL-SCNC: 136 MMOL/L (ref 135–145)
T AXIS (DEGREES): 132
T AXIS (DEGREES): 163
UUN UR-MCNC: 446 MG/DL
VENTRICULAR RATE EKG/MIN (BPM): 112
VENTRICULAR RATE EKG/MIN (BPM): 121

## 2021-06-17 PROCEDURE — 36415 COLL VENOUS BLD VENIPUNCTURE: CPT | Performed by: HOSPITALIST

## 2021-06-17 PROCEDURE — 0D718DZ DILATION OF UPPER ESOPHAGUS WITH INTRALUMINAL DEVICE, VIA NATURAL OR ARTIFICIAL OPENING ENDOSCOPIC: ICD-10-PCS | Performed by: INTERNAL MEDICINE

## 2021-06-17 PROCEDURE — 83735 ASSAY OF MAGNESIUM: CPT | Performed by: INTERNAL MEDICINE

## 2021-06-17 PROCEDURE — 10002801 HB RX 250 W/O HCPCS: Performed by: RADIOLOGY

## 2021-06-17 PROCEDURE — 3E0436Z INTRODUCTION OF NUTRITIONAL SUBSTANCE INTO CENTRAL VEIN, PERCUTANEOUS APPROACH: ICD-10-PCS | Performed by: RADIOLOGY

## 2021-06-17 PROCEDURE — 10002800 HB RX 250 W HCPCS: Performed by: INTERNAL MEDICINE

## 2021-06-17 PROCEDURE — 36573 INSJ PICC RS&I 5 YR+: CPT

## 2021-06-17 PROCEDURE — 13000032 HB GI LAB AT BEDSIDE

## 2021-06-17 PROCEDURE — 10006023 HB SUPPLY 272

## 2021-06-17 PROCEDURE — 10002803 HB RX 637: Performed by: NURSE PRACTITIONER

## 2021-06-17 PROCEDURE — 10004281 HB COUNTER-STAFF TIME PER 15 MIN

## 2021-06-17 PROCEDURE — 13000004 HB  ANESTHESIA  GENERAL OUTSIDE OR

## 2021-06-17 PROCEDURE — C1751 CATH, INF, PER/CENT/MIDLINE: HCPCS

## 2021-06-17 PROCEDURE — 80053 COMPREHEN METABOLIC PANEL: CPT | Performed by: HOSPITALIST

## 2021-06-17 PROCEDURE — 10002807 HB RX 258: Performed by: INTERNAL MEDICINE

## 2021-06-17 PROCEDURE — 87635 SARS-COV-2 COVID-19 AMP PRB: CPT | Performed by: INTERNAL MEDICINE

## 2021-06-17 PROCEDURE — 84100 ASSAY OF PHOSPHORUS: CPT | Performed by: INTERNAL MEDICINE

## 2021-06-17 PROCEDURE — 10002807 HB RX 258: Performed by: NURSE PRACTITIONER

## 2021-06-17 PROCEDURE — 10002801 HB RX 250 W/O HCPCS: Performed by: NURSE PRACTITIONER

## 2021-06-17 PROCEDURE — 10000008 HB ROOM CHARGE ICU OR CCU

## 2021-06-17 PROCEDURE — 10002801 HB RX 250 W/O HCPCS: Performed by: INTERNAL MEDICINE

## 2021-06-17 PROCEDURE — 94640 AIRWAY INHALATION TREATMENT: CPT

## 2021-06-17 PROCEDURE — 93005 ELECTROCARDIOGRAM TRACING: CPT | Performed by: NURSE PRACTITIONER

## 2021-06-17 PROCEDURE — C1874 STENT, COATED/COV W/DEL SYS: HCPCS

## 2021-06-17 PROCEDURE — 94003 VENT MGMT INPAT SUBQ DAY: CPT

## 2021-06-17 PROCEDURE — 02HV33Z INSERTION OF INFUSION DEVICE INTO SUPERIOR VENA CAVA, PERCUTANEOUS APPROACH: ICD-10-PCS | Performed by: RADIOLOGY

## 2021-06-17 PROCEDURE — 10002800 HB RX 250 W HCPCS: Performed by: NURSE PRACTITIONER

## 2021-06-17 PROCEDURE — 94668 MNPJ CHEST WALL SBSQ: CPT

## 2021-06-17 PROCEDURE — 10006027 HB SUPPLY 278

## 2021-06-17 PROCEDURE — C1769 GUIDE WIRE: HCPCS

## 2021-06-17 RX ORDER — DEXTROSE MONOHYDRATE 100 MG/ML
1000 INJECTION, SOLUTION INTRAVENOUS CONTINUOUS PRN
Status: DISCONTINUED | OUTPATIENT
Start: 2021-06-17 | End: 2021-07-08 | Stop reason: CLARIF

## 2021-06-17 RX ORDER — LIDOCAINE HYDROCHLORIDE 10 MG/ML
INJECTION, SOLUTION INFILTRATION; PERINEURAL PRN
Status: COMPLETED | OUTPATIENT
Start: 2021-06-17 | End: 2021-06-17

## 2021-06-17 RX ADMIN — METHYLPREDNISOLONE SODIUM SUCCINATE 80 MG: 40 INJECTION, POWDER, FOR SOLUTION INTRAMUSCULAR; INTRAVENOUS at 15:27

## 2021-06-17 RX ADMIN — MEROPENEM 1 G: 1 INJECTION INTRAVENOUS at 11:21

## 2021-06-17 RX ADMIN — FAMOTIDINE 20 MG: 10 INJECTION INTRAVENOUS at 08:38

## 2021-06-17 RX ADMIN — LYSINE, LEUCINE, PHENYLALANINE, VALINE, HISTIDINE, ISOLEUCINE, METHIONINE, THREONINE, TRYPTOPHAN, ALANINE, ARGININE, GLYCINE, PROLINE, GLUTAMIC ACID, SERINE, ASPARTIC ACID, TYROSINE
1.18; 1.04; 1.04; 960; 894; 749; 749; 749; 250; 2.17; 1.47; 1.04; 894; 749; 592; 434; 39 INJECTION, SOLUTION INTRAVENOUS at 22:10

## 2021-06-17 RX ADMIN — INSULIN LISPRO 1 UNITS: 100 INJECTION, SOLUTION INTRAVENOUS; SUBCUTANEOUS at 00:29

## 2021-06-17 RX ADMIN — IPRATROPIUM BROMIDE AND ALBUTEROL SULFATE 3 ML: 2.5; .5 SOLUTION RESPIRATORY (INHALATION) at 11:45

## 2021-06-17 RX ADMIN — CHLORHEXIDINE GLUCONATE 15 ML: 1.2 RINSE ORAL at 20:44

## 2021-06-17 RX ADMIN — AMIODARONE HYDROCHLORIDE 0.5 MG/MIN: 50 INJECTION, SOLUTION INTRAVENOUS at 02:32

## 2021-06-17 RX ADMIN — LIDOCAINE HYDROCHLORIDE 1 ML: 10 INJECTION, SOLUTION INFILTRATION; PERINEURAL at 16:55

## 2021-06-17 RX ADMIN — PROPOFOL 20 MCG/KG/MIN: 10 INJECTION, EMULSION INTRAVENOUS at 15:42

## 2021-06-17 RX ADMIN — IPRATROPIUM BROMIDE AND ALBUTEROL SULFATE 3 ML: 2.5; .5 SOLUTION RESPIRATORY (INHALATION) at 00:15

## 2021-06-17 RX ADMIN — METHYLPREDNISOLONE SODIUM SUCCINATE 80 MG: 40 INJECTION, POWDER, FOR SOLUTION INTRAMUSCULAR; INTRAVENOUS at 05:45

## 2021-06-17 RX ADMIN — IPRATROPIUM BROMIDE AND ALBUTEROL SULFATE 3 ML: 2.5; .5 SOLUTION RESPIRATORY (INHALATION) at 04:30

## 2021-06-17 RX ADMIN — CHLORHEXIDINE GLUCONATE 15 ML: 1.2 RINSE ORAL at 08:38

## 2021-06-17 RX ADMIN — SODIUM CHLORIDE, POTASSIUM CHLORIDE, SODIUM LACTATE AND CALCIUM CHLORIDE: 600; 310; 30; 20 INJECTION, SOLUTION INTRAVENOUS at 03:12

## 2021-06-17 RX ADMIN — MINERAL OIL, PETROLATUM 1 APPLICATION.: 425; 573 OINTMENT OPHTHALMIC at 20:44

## 2021-06-17 RX ADMIN — IPRATROPIUM BROMIDE AND ALBUTEROL SULFATE 3 ML: 2.5; .5 SOLUTION RESPIRATORY (INHALATION) at 08:16

## 2021-06-17 RX ADMIN — INSULIN LISPRO 1 UNITS: 100 INJECTION, SOLUTION INTRAVENOUS; SUBCUTANEOUS at 06:57

## 2021-06-17 RX ADMIN — PROPOFOL 20 MCG/KG/MIN: 10 INJECTION, EMULSION INTRAVENOUS at 02:47

## 2021-06-17 ASSESSMENT — PAIN SCALES - WONG BAKER
WONGBAKER_NUMERICALRESPONSE: 0

## 2021-06-17 ASSESSMENT — PAIN SCALES - GENERAL
PAINLEVEL_OUTOF10: 0

## 2021-06-17 ASSESSMENT — PAIN SCALES - BEHAVIORAL PAIN SCALE (BPS)
BPS_SCORE: 4
BPS_SCORE: 3

## 2021-06-18 ENCOUNTER — APPOINTMENT (OUTPATIENT)
Dept: GENERAL RADIOLOGY | Age: 86
DRG: 266 | End: 2021-06-18
Attending: INTERNAL MEDICINE

## 2021-06-18 LAB
ALBUMIN SERPL-MCNC: 2 G/DL (ref 3.6–5.1)
ALBUMIN/GLOB SERPL: 0.6 {RATIO} (ref 1–2.4)
ALP SERPL-CCNC: 41 UNITS/L (ref 45–117)
ALT SERPL-CCNC: 535 UNITS/L
ANION GAP SERPL CALC-SCNC: 11 MMOL/L (ref 10–20)
AST SERPL-CCNC: 237 UNITS/L
BASOPHILS # BLD: 0 K/MCL (ref 0–0.3)
BASOPHILS NFR BLD: 0 %
BILIRUB SERPL-MCNC: 0.5 MG/DL (ref 0.2–1)
BUN SERPL-MCNC: 61 MG/DL (ref 6–20)
BUN/CREAT SERPL: 40 (ref 7–25)
BURR CELLS BLD QL SMEAR: ABNORMAL
CALCIUM SERPL-MCNC: 8.4 MG/DL (ref 8.4–10.2)
CHLORIDE SERPL-SCNC: 104 MMOL/L (ref 98–107)
CO2 SERPL-SCNC: 25 MMOL/L (ref 21–32)
CREAT SERPL-MCNC: 1.51 MG/DL (ref 0.51–0.95)
DEPRECATED RDW RBC: 51.7 FL (ref 39–50)
EOSINOPHIL # BLD: 0 K/MCL (ref 0–0.5)
EOSINOPHIL NFR BLD: 0 %
ERYTHROCYTE [DISTWIDTH] IN BLOOD: 14.8 % (ref 11–15)
FASTING DURATION TIME PATIENT: ABNORMAL H
FASTING DURATION TIME PATIENT: NORMAL H
GFR SERPLBLD BASED ON 1.73 SQ M-ARVRAT: 30 ML/MIN/1.73M2
GLOBULIN SER-MCNC: 3.4 G/DL (ref 2–4)
GLUCOSE BLDC GLUCOMTR-MCNC: 160 MG/DL (ref 70–99)
GLUCOSE BLDC GLUCOMTR-MCNC: 171 MG/DL (ref 70–99)
GLUCOSE BLDC GLUCOMTR-MCNC: 196 MG/DL (ref 70–99)
GLUCOSE BLDC GLUCOMTR-MCNC: 202 MG/DL (ref 70–99)
GLUCOSE SERPL-MCNC: 211 MG/DL (ref 65–99)
HCT VFR BLD CALC: 23.6 % (ref 36–46.5)
HGB BLD-MCNC: 7.9 G/DL (ref 12–15.5)
LYMPHOCYTES # BLD: 0.3 K/MCL (ref 1–4)
LYMPHOCYTES NFR BLD: 3 %
MAGNESIUM SERPL-MCNC: 2.6 MG/DL (ref 1.7–2.4)
MCH RBC QN AUTO: 31.7 PG (ref 26–34)
MCHC RBC AUTO-ENTMCNC: 33.5 G/DL (ref 32–36.5)
MCV RBC AUTO: 94.8 FL (ref 78–100)
MONOCYTES # BLD: 0.6 K/MCL (ref 0.3–0.9)
MONOCYTES NFR BLD: 5 %
NEUTROPHILS # BLD: 10.6 K/MCL (ref 1.8–7.7)
NEUTS BAND NFR BLD: 1 % (ref 0–10)
NEUTS SEG NFR BLD: 91 %
NRBC BLD MANUAL-RTO: 4 /100 WBC
PHOSPHATE SERPL-MCNC: 3.8 MG/DL (ref 2.4–4.7)
PLAT MORPH BLD: NORMAL
PLATELET # BLD AUTO: 122 K/MCL (ref 140–450)
POLYCHROMASIA BLD QL SMEAR: ABNORMAL
POTASSIUM SERPL-SCNC: 3.7 MMOL/L (ref 3.4–5.1)
PROT SERPL-MCNC: 5.4 G/DL (ref 6.4–8.2)
RBC # BLD: 2.49 MIL/MCL (ref 4–5.2)
SODIUM SERPL-SCNC: 136 MMOL/L (ref 135–145)
TRIGL SERPL-MCNC: 82 MG/DL
WBC # BLD: 11.5 K/MCL (ref 4.2–11)
WBC MORPH BLD: NORMAL

## 2021-06-18 PROCEDURE — 10002800 HB RX 250 W HCPCS: Performed by: INTERNAL MEDICINE

## 2021-06-18 PROCEDURE — 94668 MNPJ CHEST WALL SBSQ: CPT

## 2021-06-18 PROCEDURE — 80053 COMPREHEN METABOLIC PANEL: CPT | Performed by: HOSPITALIST

## 2021-06-18 PROCEDURE — 10002801 HB RX 250 W/O HCPCS: Performed by: INTERNAL MEDICINE

## 2021-06-18 PROCEDURE — 84478 ASSAY OF TRIGLYCERIDES: CPT

## 2021-06-18 PROCEDURE — 10002807 HB RX 258: Performed by: INTERNAL MEDICINE

## 2021-06-18 PROCEDURE — 10000008 HB ROOM CHARGE ICU OR CCU

## 2021-06-18 PROCEDURE — 10002801 HB RX 250 W/O HCPCS: Performed by: NURSE PRACTITIONER

## 2021-06-18 PROCEDURE — 83735 ASSAY OF MAGNESIUM: CPT | Performed by: INTERNAL MEDICINE

## 2021-06-18 PROCEDURE — 10002800 HB RX 250 W HCPCS: Performed by: NURSE PRACTITIONER

## 2021-06-18 PROCEDURE — 84100 ASSAY OF PHOSPHORUS: CPT | Performed by: INTERNAL MEDICINE

## 2021-06-18 PROCEDURE — 94003 VENT MGMT INPAT SUBQ DAY: CPT

## 2021-06-18 PROCEDURE — 85027 COMPLETE CBC AUTOMATED: CPT | Performed by: HOSPITALIST

## 2021-06-18 PROCEDURE — 94150 VITAL CAPACITY TEST: CPT

## 2021-06-18 PROCEDURE — 10002803 HB RX 637: Performed by: NURSE PRACTITIONER

## 2021-06-18 PROCEDURE — 10004281 HB COUNTER-STAFF TIME PER 15 MIN

## 2021-06-18 PROCEDURE — 71045 X-RAY EXAM CHEST 1 VIEW: CPT

## 2021-06-18 PROCEDURE — 10002807 HB RX 258: Performed by: NURSE PRACTITIONER

## 2021-06-18 PROCEDURE — 94640 AIRWAY INHALATION TREATMENT: CPT

## 2021-06-18 RX ORDER — HUMAN INSULIN 100 [IU]/ML
INJECTION, SOLUTION SUBCUTANEOUS EVERY 6 HOURS SCHEDULED
Status: DISCONTINUED | OUTPATIENT
Start: 2021-06-18 | End: 2021-07-08

## 2021-06-18 RX ORDER — FUROSEMIDE 10 MG/ML
20 INJECTION INTRAMUSCULAR; INTRAVENOUS
Status: DISCONTINUED | OUTPATIENT
Start: 2021-06-18 | End: 2021-06-19

## 2021-06-18 RX ADMIN — CHLORHEXIDINE GLUCONATE 15 ML: 1.2 RINSE ORAL at 20:48

## 2021-06-18 RX ADMIN — VANCOMYCIN HYDROCHLORIDE 1000 MG: 1 INJECTION, POWDER, LYOPHILIZED, FOR SOLUTION INTRAVENOUS at 20:51

## 2021-06-18 RX ADMIN — FAMOTIDINE 20 MG: 10 INJECTION INTRAVENOUS at 08:02

## 2021-06-18 RX ADMIN — FUROSEMIDE 20 MG: 10 INJECTION, SOLUTION INTRAMUSCULAR; INTRAVENOUS at 10:14

## 2021-06-18 RX ADMIN — IPRATROPIUM BROMIDE AND ALBUTEROL SULFATE 3 ML: 2.5; .5 SOLUTION RESPIRATORY (INHALATION) at 08:13

## 2021-06-18 RX ADMIN — AMIODARONE HYDROCHLORIDE 0.5 MG/MIN: 50 INJECTION, SOLUTION INTRAVENOUS at 11:08

## 2021-06-18 RX ADMIN — LYSINE, LEUCINE, PHENYLALANINE, VALINE, HISTIDINE, ISOLEUCINE, METHIONINE, THREONINE, TRYPTOPHAN, ALANINE, ARGININE, GLYCINE, PROLINE, GLUTAMIC ACID, SERINE, ASPARTIC ACID, TYROSINE
1.18; 1.04; 1.04; 960; 894; 749; 749; 749; 250; 2.17; 1.47; 1.04; 894; 749; 592; 434; 39 INJECTION, SOLUTION INTRAVENOUS at 21:21

## 2021-06-18 RX ADMIN — PROPOFOL 20 MCG/KG/MIN: 10 INJECTION, EMULSION INTRAVENOUS at 17:34

## 2021-06-18 RX ADMIN — INSULIN HUMAN 2 UNITS: 100 INJECTION, SOLUTION PARENTERAL at 12:22

## 2021-06-18 RX ADMIN — PROPOFOL 20 MCG/KG/MIN: 10 INJECTION, EMULSION INTRAVENOUS at 05:00

## 2021-06-18 RX ADMIN — MINERAL OIL, PETROLATUM 1 APPLICATION.: 425; 573 OINTMENT OPHTHALMIC at 20:52

## 2021-06-18 RX ADMIN — MEROPENEM 1 G: 1 INJECTION INTRAVENOUS at 12:07

## 2021-06-18 RX ADMIN — INSULIN HUMAN 2 UNITS: 100 INJECTION, SOLUTION PARENTERAL at 17:32

## 2021-06-18 RX ADMIN — MEROPENEM 1 G: 1 INJECTION INTRAVENOUS at 00:51

## 2021-06-18 RX ADMIN — FUROSEMIDE 20 MG: 10 INJECTION, SOLUTION INTRAMUSCULAR; INTRAVENOUS at 17:31

## 2021-06-18 RX ADMIN — CHLORHEXIDINE GLUCONATE 15 ML: 1.2 RINSE ORAL at 08:02

## 2021-06-18 RX ADMIN — IPRATROPIUM BROMIDE AND ALBUTEROL SULFATE 3 ML: 2.5; .5 SOLUTION RESPIRATORY (INHALATION) at 04:41

## 2021-06-18 RX ADMIN — Medication 5 MCG/MIN: at 00:38

## 2021-06-18 RX ADMIN — Medication 50 MCG/HR: at 15:27

## 2021-06-18 RX ADMIN — IPRATROPIUM BROMIDE AND ALBUTEROL SULFATE 3 ML: 2.5; .5 SOLUTION RESPIRATORY (INHALATION) at 00:31

## 2021-06-18 ASSESSMENT — PAIN SCALES - BEHAVIORAL PAIN SCALE (BPS)
BPS_SCORE: 3
BPS_SCORE: 7

## 2021-06-18 ASSESSMENT — PAIN SCALES - WONG BAKER
WONGBAKER_NUMERICALRESPONSE: 0
WONGBAKER_NUMERICALRESPONSE: 0

## 2021-06-19 LAB
ALBUMIN SERPL-MCNC: 1.9 G/DL (ref 3.6–5.1)
ALBUMIN/GLOB SERPL: 0.6 {RATIO} (ref 1–2.4)
ALP SERPL-CCNC: 43 UNITS/L (ref 45–117)
ALT SERPL-CCNC: 476 UNITS/L
ANION GAP SERPL CALC-SCNC: 9 MMOL/L (ref 10–20)
APTT PPP: 21 SEC (ref 22–30)
APTT PPP: 33 SEC (ref 22–30)
AST SERPL-CCNC: 176 UNITS/L
BASOPHILS # BLD: 0 K/MCL (ref 0–0.3)
BASOPHILS NFR BLD: 0 %
BILIRUB SERPL-MCNC: 0.5 MG/DL (ref 0.2–1)
BUN SERPL-MCNC: 67 MG/DL (ref 6–20)
BUN/CREAT SERPL: 46 (ref 7–25)
BURR CELLS BLD QL SMEAR: ABNORMAL
CALCIUM SERPL-MCNC: 8 MG/DL (ref 8.4–10.2)
CHLORIDE SERPL-SCNC: 103 MMOL/L (ref 98–107)
CO2 SERPL-SCNC: 28 MMOL/L (ref 21–32)
CREAT SERPL-MCNC: 1.46 MG/DL (ref 0.51–0.95)
DEPRECATED RDW RBC: 51.1 FL (ref 39–50)
DEPRECATED RDW RBC: 51.5 FL (ref 39–50)
EOSINOPHIL # BLD: 0 K/MCL (ref 0–0.5)
EOSINOPHIL NFR BLD: 0 %
ERYTHROCYTE [DISTWIDTH] IN BLOOD: 14.7 % (ref 11–15)
ERYTHROCYTE [DISTWIDTH] IN BLOOD: 14.9 % (ref 11–15)
FASTING DURATION TIME PATIENT: ABNORMAL H
GFR SERPLBLD BASED ON 1.73 SQ M-ARVRAT: 31 ML/MIN/1.73M2
GLOBULIN SER-MCNC: 3.2 G/DL (ref 2–4)
GLUCOSE BLDC GLUCOMTR-MCNC: 148 MG/DL (ref 70–99)
GLUCOSE BLDC GLUCOMTR-MCNC: 155 MG/DL (ref 70–99)
GLUCOSE BLDC GLUCOMTR-MCNC: 157 MG/DL (ref 70–99)
GLUCOSE BLDC GLUCOMTR-MCNC: 161 MG/DL (ref 70–99)
GLUCOSE BLDC GLUCOMTR-MCNC: 166 MG/DL (ref 70–99)
GLUCOSE SERPL-MCNC: 147 MG/DL (ref 65–99)
HCT VFR BLD CALC: 22.1 % (ref 36–46.5)
HCT VFR BLD CALC: 26.8 % (ref 36–46.5)
HGB BLD-MCNC: 7.3 G/DL (ref 12–15.5)
HGB BLD-MCNC: 8.8 G/DL (ref 12–15.5)
INR PPP: 0.9
LYMPHOCYTES # BLD: 0.3 K/MCL (ref 1–4)
LYMPHOCYTES NFR BLD: 2 %
MAGNESIUM SERPL-MCNC: 2.4 MG/DL (ref 1.7–2.4)
MCH RBC QN AUTO: 30.8 PG (ref 26–34)
MCH RBC QN AUTO: 31.1 PG (ref 26–34)
MCHC RBC AUTO-ENTMCNC: 32.8 G/DL (ref 32–36.5)
MCHC RBC AUTO-ENTMCNC: 33 G/DL (ref 32–36.5)
MCV RBC AUTO: 93.2 FL (ref 78–100)
MCV RBC AUTO: 94.7 FL (ref 78–100)
MONOCYTES # BLD: 0.9 K/MCL (ref 0.3–0.9)
MONOCYTES NFR BLD: 7 %
NEUTROPHILS # BLD: 11.6 K/MCL (ref 1.8–7.7)
NEUTS BAND NFR BLD: 4 % (ref 0–10)
NEUTS SEG NFR BLD: 87 %
NRBC BLD MANUAL-RTO: 2 /100 WBC
NRBC BLD MANUAL-RTO: 3 /100 WBC
PHOSPHATE SERPL-MCNC: 2.7 MG/DL (ref 2.4–4.7)
PLAT MORPH BLD: NORMAL
PLATELET # BLD AUTO: 118 K/MCL (ref 140–450)
PLATELET # BLD AUTO: 98 K/MCL (ref 140–450)
POLYCHROMASIA BLD QL SMEAR: ABNORMAL
POTASSIUM SERPL-SCNC: 3.8 MMOL/L (ref 3.4–5.1)
PROT SERPL-MCNC: 5.1 G/DL (ref 6.4–8.2)
PROTHROMBIN TIME: 10.1 SEC (ref 9.7–11.8)
RBC # BLD: 2.37 MIL/MCL (ref 4–5.2)
RBC # BLD: 2.83 MIL/MCL (ref 4–5.2)
SODIUM SERPL-SCNC: 136 MMOL/L (ref 135–145)
WBC # BLD: 12.7 K/MCL (ref 4.2–11)
WBC # BLD: 17.6 K/MCL (ref 4.2–11)
WBC MORPH BLD: NORMAL

## 2021-06-19 PROCEDURE — 84100 ASSAY OF PHOSPHORUS: CPT | Performed by: INTERNAL MEDICINE

## 2021-06-19 PROCEDURE — 10002801 HB RX 250 W/O HCPCS: Performed by: INTERNAL MEDICINE

## 2021-06-19 PROCEDURE — 10002801 HB RX 250 W/O HCPCS: Performed by: NURSE PRACTITIONER

## 2021-06-19 PROCEDURE — 10002800 HB RX 250 W HCPCS: Performed by: INTERNAL MEDICINE

## 2021-06-19 PROCEDURE — 85027 COMPLETE CBC AUTOMATED: CPT | Performed by: HOSPITALIST

## 2021-06-19 PROCEDURE — 10000008 HB ROOM CHARGE ICU OR CCU

## 2021-06-19 PROCEDURE — 10002803 HB RX 637: Performed by: NURSE PRACTITIONER

## 2021-06-19 PROCEDURE — 94003 VENT MGMT INPAT SUBQ DAY: CPT

## 2021-06-19 PROCEDURE — 10002807 HB RX 258: Performed by: NURSE PRACTITIONER

## 2021-06-19 PROCEDURE — 10002800 HB RX 250 W HCPCS: Performed by: NURSE PRACTITIONER

## 2021-06-19 PROCEDURE — 10002801 HB RX 250 W/O HCPCS: Performed by: THORACIC SURGERY (CARDIOTHORACIC VASCULAR SURGERY)

## 2021-06-19 PROCEDURE — 85730 THROMBOPLASTIN TIME PARTIAL: CPT | Performed by: INTERNAL MEDICINE

## 2021-06-19 PROCEDURE — 10002807 HB RX 258: Performed by: INTERNAL MEDICINE

## 2021-06-19 PROCEDURE — 80053 COMPREHEN METABOLIC PANEL: CPT | Performed by: HOSPITALIST

## 2021-06-19 PROCEDURE — 94640 AIRWAY INHALATION TREATMENT: CPT

## 2021-06-19 PROCEDURE — 85610 PROTHROMBIN TIME: CPT | Performed by: INTERNAL MEDICINE

## 2021-06-19 PROCEDURE — 10004281 HB COUNTER-STAFF TIME PER 15 MIN

## 2021-06-19 PROCEDURE — 85027 COMPLETE CBC AUTOMATED: CPT | Performed by: INTERNAL MEDICINE

## 2021-06-19 PROCEDURE — 83735 ASSAY OF MAGNESIUM: CPT | Performed by: INTERNAL MEDICINE

## 2021-06-19 RX ORDER — FUROSEMIDE 10 MG/ML
20 INJECTION INTRAMUSCULAR; INTRAVENOUS ONCE
Status: COMPLETED | OUTPATIENT
Start: 2021-06-19 | End: 2021-06-19

## 2021-06-19 RX ORDER — FUROSEMIDE 10 MG/ML
40 INJECTION INTRAMUSCULAR; INTRAVENOUS
Status: DISCONTINUED | OUTPATIENT
Start: 2021-06-19 | End: 2021-06-21

## 2021-06-19 RX ORDER — HEPARIN SODIUM 1000 [USP'U]/ML
30 INJECTION, SOLUTION INTRAVENOUS; SUBCUTANEOUS PRN
Status: DISCONTINUED | OUTPATIENT
Start: 2021-06-19 | End: 2021-06-21

## 2021-06-19 RX ORDER — HEPARIN SODIUM 1000 [USP'U]/ML
60 INJECTION, SOLUTION INTRAVENOUS; SUBCUTANEOUS ONCE
Status: COMPLETED | OUTPATIENT
Start: 2021-06-19 | End: 2021-06-19

## 2021-06-19 RX ORDER — ALBUTEROL SULFATE 2.5 MG/3ML
2.5 SOLUTION RESPIRATORY (INHALATION)
Status: DISCONTINUED | OUTPATIENT
Start: 2021-06-19 | End: 2021-06-23

## 2021-06-19 RX ORDER — HEPARIN SODIUM 1000 [USP'U]/ML
60 INJECTION, SOLUTION INTRAVENOUS; SUBCUTANEOUS PRN
Status: DISCONTINUED | OUTPATIENT
Start: 2021-06-19 | End: 2021-06-21

## 2021-06-19 RX ORDER — HEPARIN SODIUM 10000 [USP'U]/100ML
0-30 INJECTION, SOLUTION INTRAVENOUS CONTINUOUS
Status: DISCONTINUED | OUTPATIENT
Start: 2021-06-19 | End: 2021-06-21

## 2021-06-19 RX ADMIN — HEPARIN SODIUM 2800 UNITS: 1000 INJECTION, SOLUTION INTRAVENOUS; SUBCUTANEOUS at 19:28

## 2021-06-19 RX ADMIN — PROPOFOL 20 MCG/KG/MIN: 10 INJECTION, EMULSION INTRAVENOUS at 03:42

## 2021-06-19 RX ADMIN — ALBUTEROL SULFATE 2.5 MG: 2.5 SOLUTION RESPIRATORY (INHALATION) at 20:17

## 2021-06-19 RX ADMIN — AMIODARONE HYDROCHLORIDE 0.5 MG/MIN: 50 INJECTION, SOLUTION INTRAVENOUS at 17:24

## 2021-06-19 RX ADMIN — INSULIN HUMAN 2 UNITS: 100 INJECTION, SOLUTION PARENTERAL at 17:40

## 2021-06-19 RX ADMIN — CHLORHEXIDINE GLUCONATE 15 ML: 1.2 RINSE ORAL at 08:16

## 2021-06-19 RX ADMIN — AMIODARONE HYDROCHLORIDE 0.5 MG/MIN: 50 INJECTION, SOLUTION INTRAVENOUS at 02:39

## 2021-06-19 RX ADMIN — LYSINE, LEUCINE, PHENYLALANINE, VALINE, HISTIDINE, ISOLEUCINE, METHIONINE, THREONINE, TRYPTOPHAN, ALANINE, ARGININE, GLYCINE, PROLINE, GLUTAMIC ACID, SERINE, ASPARTIC ACID, TYROSINE
1.18; 1.04; 1.04; 960; 894; 749; 749; 749; 250; 2.17; 1.47; 1.04; 894; 749; 592; 434; 39 INJECTION, SOLUTION INTRAVENOUS at 22:29

## 2021-06-19 RX ADMIN — MEROPENEM 1 G: 1 INJECTION INTRAVENOUS at 00:22

## 2021-06-19 RX ADMIN — INSULIN HUMAN 2 UNITS: 100 INJECTION, SOLUTION PARENTERAL at 12:56

## 2021-06-19 RX ADMIN — ALBUTEROL SULFATE 2.5 MG: 2.5 SOLUTION RESPIRATORY (INHALATION) at 12:50

## 2021-06-19 RX ADMIN — MEROPENEM 1 G: 1 INJECTION INTRAVENOUS at 11:24

## 2021-06-19 RX ADMIN — FAMOTIDINE 20 MG: 10 INJECTION INTRAVENOUS at 08:16

## 2021-06-19 RX ADMIN — HEPARIN SODIUM 12 UNITS/KG/HR: 10000 INJECTION, SOLUTION INTRAVENOUS at 12:59

## 2021-06-19 RX ADMIN — INSULIN HUMAN 2 UNITS: 100 INJECTION, SOLUTION PARENTERAL at 00:26

## 2021-06-19 RX ADMIN — FUROSEMIDE 40 MG: 10 INJECTION, SOLUTION INTRAMUSCULAR; INTRAVENOUS at 17:23

## 2021-06-19 RX ADMIN — I.V. FAT EMULSION 250 ML: 20 EMULSION INTRAVENOUS at 22:35

## 2021-06-19 RX ADMIN — FUROSEMIDE 20 MG: 10 INJECTION, SOLUTION INTRAMUSCULAR; INTRAVENOUS at 11:24

## 2021-06-19 RX ADMIN — HEPARIN SODIUM 2800 UNITS: 1000 INJECTION, SOLUTION INTRAVENOUS; SUBCUTANEOUS at 12:56

## 2021-06-19 RX ADMIN — FUROSEMIDE 20 MG: 10 INJECTION, SOLUTION INTRAMUSCULAR; INTRAVENOUS at 08:16

## 2021-06-19 ASSESSMENT — MOVEMENT AND STRENGTH ASSESSMENTS
RLE_ASSESSMENT: FAIR/COMPLETE ROM AGAINST GRAVITY, NO ADDED RESISTANCE
RLE_ASSESSMENT: FAIR/COMPLETE ROM AGAINST GRAVITY, NO ADDED RESISTANCE
RUE_ASSESSMENT: FAIR/COMPLETE ROM AGAINST GRAVITY, NO ADDED RESISTANCE
LLE_ASSESSMENT: FAIR/COMPLETE ROM AGAINST GRAVITY, NO ADDED RESISTANCE
RUE_ASSESSMENT: FAIR/COMPLETE ROM AGAINST GRAVITY, NO ADDED RESISTANCE
LLE_ASSESSMENT: FAIR/COMPLETE ROM AGAINST GRAVITY, NO ADDED RESISTANCE
LUE_ASSESSMENT: FAIR/COMPLETE ROM AGAINST GRAVITY, NO ADDED RESISTANCE
LLE_ASSESSMENT: FAIR/COMPLETE ROM AGAINST GRAVITY, NO ADDED RESISTANCE
RLE_ASSESSMENT: FAIR/COMPLETE ROM AGAINST GRAVITY, NO ADDED RESISTANCE
RUE_ASSESSMENT: FAIR/COMPLETE ROM AGAINST GRAVITY, NO ADDED RESISTANCE

## 2021-06-19 ASSESSMENT — PAIN SCALES - BEHAVIORAL PAIN SCALE (BPS): BPS_SCORE: 3

## 2021-06-19 ASSESSMENT — PAIN SCALES - GENERAL
PAINLEVEL_OUTOF10: 0

## 2021-06-19 ASSESSMENT — PAIN SCALES - WONG BAKER: WONGBAKER_NUMERICALRESPONSE: 0

## 2021-06-19 ASSESSMENT — PULMONARY FUNCTION TESTS: FEV1/FVC: UNABLE TO OBTAIN, OR GREATER THAN 70%

## 2021-06-20 LAB
ALBUMIN SERPL-MCNC: 1.9 G/DL (ref 3.6–5.1)
ALBUMIN/GLOB SERPL: 0.5 {RATIO} (ref 1–2.4)
ALP SERPL-CCNC: 59 UNITS/L (ref 45–117)
ALT SERPL-CCNC: 438 UNITS/L
ANION GAP SERPL CALC-SCNC: 8 MMOL/L (ref 10–20)
APTT PPP: 45 SEC (ref 22–30)
APTT PPP: 50 SEC (ref 22–30)
AST SERPL-CCNC: 144 UNITS/L
BASOPHILS # BLD: 0 K/MCL (ref 0–0.3)
BASOPHILS NFR BLD: 0 %
BILIRUB SERPL-MCNC: 0.6 MG/DL (ref 0.2–1)
BUN SERPL-MCNC: 67 MG/DL (ref 6–20)
BUN/CREAT SERPL: 64 (ref 7–25)
BURR CELLS BLD QL SMEAR: ABNORMAL
CALCIUM SERPL-MCNC: 8.2 MG/DL (ref 8.4–10.2)
CHLORIDE SERPL-SCNC: 99 MMOL/L (ref 98–107)
CO2 SERPL-SCNC: 35 MMOL/L (ref 21–32)
CREAT SERPL-MCNC: 1.04 MG/DL (ref 0.51–0.95)
DEPRECATED RDW RBC: 51.2 FL (ref 39–50)
EOSINOPHIL # BLD: 0 K/MCL (ref 0–0.5)
EOSINOPHIL NFR BLD: 0 %
ERYTHROCYTE [DISTWIDTH] IN BLOOD: 14.8 % (ref 11–15)
FASTING DURATION TIME PATIENT: ABNORMAL H
GFR SERPLBLD BASED ON 1.73 SQ M-ARVRAT: 47 ML/MIN/1.73M2
GLOBULIN SER-MCNC: 3.5 G/DL (ref 2–4)
GLUCOSE BLDC GLUCOMTR-MCNC: 137 MG/DL (ref 70–99)
GLUCOSE BLDC GLUCOMTR-MCNC: 144 MG/DL (ref 70–99)
GLUCOSE BLDC GLUCOMTR-MCNC: 148 MG/DL (ref 70–99)
GLUCOSE BLDC GLUCOMTR-MCNC: 156 MG/DL (ref 70–99)
GLUCOSE BLDC GLUCOMTR-MCNC: 174 MG/DL (ref 70–99)
GLUCOSE SERPL-MCNC: 146 MG/DL (ref 65–99)
HCT VFR BLD CALC: 26.3 % (ref 36–46.5)
HGB BLD-MCNC: 8.8 G/DL (ref 12–15.5)
LYMPHOCYTES # BLD: 2.5 K/MCL (ref 1–4)
LYMPHOCYTES NFR BLD: 13 %
MAGNESIUM SERPL-MCNC: 2.1 MG/DL (ref 1.7–2.4)
MCH RBC QN AUTO: 31.1 PG (ref 26–34)
MCHC RBC AUTO-ENTMCNC: 33.5 G/DL (ref 32–36.5)
MCV RBC AUTO: 92.9 FL (ref 78–100)
METAMYELOCYTES NFR BLD: 2 % (ref 0–2)
MONOCYTES # BLD: 2 K/MCL (ref 0.3–0.9)
MONOCYTES NFR BLD: 11 %
MYELOCYTES # BLD MANUAL: 2 %
NEUTROPHILS # BLD: 12.8 K/MCL (ref 1.8–7.7)
NEUTS BAND NFR BLD: 2 % (ref 0–10)
NEUTS SEG NFR BLD: 69 %
NRBC BLD MANUAL-RTO: 4 /100 WBC
PHOSPHATE SERPL-MCNC: 1.9 MG/DL (ref 2.4–4.7)
PHOSPHATE SERPL-MCNC: 4 MG/DL (ref 2.4–4.7)
PLAT MORPH BLD: NORMAL
PLATELET # BLD AUTO: 112 K/MCL (ref 140–450)
POTASSIUM SERPL-SCNC: 3.1 MMOL/L (ref 3.4–5.1)
POTASSIUM SERPL-SCNC: 3.4 MMOL/L (ref 3.4–5.1)
PROT SERPL-MCNC: 5.4 G/DL (ref 6.4–8.2)
RBC # BLD: 2.83 MIL/MCL (ref 4–5.2)
SODIUM SERPL-SCNC: 139 MMOL/L (ref 135–145)
VANCOMYCIN TROUGH SERPL-MCNC: 14.1 MCG/ML (ref 10–20)
VARIANT LYMPHS NFR BLD: 1 % (ref 0–5)
WBC # BLD: 18 K/MCL (ref 4.2–11)
WBC MORPH BLD: NORMAL

## 2021-06-20 PROCEDURE — 13003289 HB OXYGEN THERAPY DAILY

## 2021-06-20 PROCEDURE — 84100 ASSAY OF PHOSPHORUS: CPT | Performed by: INTERNAL MEDICINE

## 2021-06-20 PROCEDURE — 85730 THROMBOPLASTIN TIME PARTIAL: CPT | Performed by: INTERNAL MEDICINE

## 2021-06-20 PROCEDURE — 10002807 HB RX 258: Performed by: NURSE PRACTITIONER

## 2021-06-20 PROCEDURE — 10002801 HB RX 250 W/O HCPCS: Performed by: THORACIC SURGERY (CARDIOTHORACIC VASCULAR SURGERY)

## 2021-06-20 PROCEDURE — 10002800 HB RX 250 W HCPCS: Performed by: INTERNAL MEDICINE

## 2021-06-20 PROCEDURE — 94640 AIRWAY INHALATION TREATMENT: CPT

## 2021-06-20 PROCEDURE — 10002807 HB RX 258: Performed by: INTERNAL MEDICINE

## 2021-06-20 PROCEDURE — 10000008 HB ROOM CHARGE ICU OR CCU

## 2021-06-20 PROCEDURE — 10002800 HB RX 250 W HCPCS: Performed by: THORACIC SURGERY (CARDIOTHORACIC VASCULAR SURGERY)

## 2021-06-20 PROCEDURE — 10002801 HB RX 250 W/O HCPCS: Performed by: NURSE PRACTITIONER

## 2021-06-20 PROCEDURE — 85730 THROMBOPLASTIN TIME PARTIAL: CPT | Performed by: THORACIC SURGERY (CARDIOTHORACIC VASCULAR SURGERY)

## 2021-06-20 PROCEDURE — 10004281 HB COUNTER-STAFF TIME PER 15 MIN

## 2021-06-20 PROCEDURE — 10002800 HB RX 250 W HCPCS: Performed by: NURSE PRACTITIONER

## 2021-06-20 PROCEDURE — 10002807 HB RX 258: Performed by: THORACIC SURGERY (CARDIOTHORACIC VASCULAR SURGERY)

## 2021-06-20 PROCEDURE — 83735 ASSAY OF MAGNESIUM: CPT | Performed by: INTERNAL MEDICINE

## 2021-06-20 PROCEDURE — 80202 ASSAY OF VANCOMYCIN: CPT | Performed by: NURSE PRACTITIONER

## 2021-06-20 PROCEDURE — 80053 COMPREHEN METABOLIC PANEL: CPT | Performed by: HOSPITALIST

## 2021-06-20 PROCEDURE — 85027 COMPLETE CBC AUTOMATED: CPT | Performed by: HOSPITALIST

## 2021-06-20 PROCEDURE — 84132 ASSAY OF SERUM POTASSIUM: CPT | Performed by: INTERNAL MEDICINE

## 2021-06-20 RX ORDER — POTASSIUM CHLORIDE 29.8 MG/ML
20 INJECTION INTRAVENOUS ONCE
Status: DISCONTINUED | OUTPATIENT
Start: 2021-06-20 | End: 2021-06-20 | Stop reason: SDUPTHER

## 2021-06-20 RX ORDER — POTASSIUM CHLORIDE 29.8 MG/ML
20 INJECTION INTRAVENOUS ONCE
Status: DISCONTINUED | OUTPATIENT
Start: 2021-06-20 | End: 2021-06-20

## 2021-06-20 RX ORDER — DIPHENHYDRAMINE HYDROCHLORIDE 50 MG/ML
25 INJECTION INTRAMUSCULAR; INTRAVENOUS ONCE
Status: COMPLETED | OUTPATIENT
Start: 2021-06-21 | End: 2021-06-21

## 2021-06-20 RX ORDER — POTASSIUM CHLORIDE 29.8 MG/ML
20 INJECTION INTRAVENOUS ONCE
Status: COMPLETED | OUTPATIENT
Start: 2021-06-20 | End: 2021-06-20

## 2021-06-20 RX ADMIN — HYDROCORTISONE SODIUM SUCCINATE 100 MG: 100 INJECTION, POWDER, FOR SOLUTION INTRAMUSCULAR; INTRAVENOUS at 20:38

## 2021-06-20 RX ADMIN — MEROPENEM 1 G: 1 INJECTION INTRAVENOUS at 23:44

## 2021-06-20 RX ADMIN — VANCOMYCIN HYDROCHLORIDE 1000 MG: 1 INJECTION, POWDER, LYOPHILIZED, FOR SOLUTION INTRAVENOUS at 21:16

## 2021-06-20 RX ADMIN — AMIODARONE HYDROCHLORIDE 0.5 MG/MIN: 50 INJECTION, SOLUTION INTRAVENOUS at 20:52

## 2021-06-20 RX ADMIN — MEROPENEM 1 G: 1 INJECTION INTRAVENOUS at 00:13

## 2021-06-20 RX ADMIN — INSULIN HUMAN 2 UNITS: 100 INJECTION, SOLUTION PARENTERAL at 00:15

## 2021-06-20 RX ADMIN — AMIODARONE HYDROCHLORIDE 0.5 MG/MIN: 50 INJECTION, SOLUTION INTRAVENOUS at 08:10

## 2021-06-20 RX ADMIN — POTASSIUM PHOSPHATE, MONOBASIC AND POTASSIUM PHOSPHATE, DIBASIC 30 MMOL: 224; 236 INJECTION, SOLUTION, CONCENTRATE INTRAVENOUS at 08:06

## 2021-06-20 RX ADMIN — ALBUTEROL SULFATE 2.5 MG: 2.5 SOLUTION RESPIRATORY (INHALATION) at 07:46

## 2021-06-20 RX ADMIN — FUROSEMIDE 40 MG: 10 INJECTION, SOLUTION INTRAMUSCULAR; INTRAVENOUS at 17:09

## 2021-06-20 RX ADMIN — ALBUTEROL SULFATE 2.5 MG: 2.5 SOLUTION RESPIRATORY (INHALATION) at 19:54

## 2021-06-20 RX ADMIN — POTASSIUM CHLORIDE 20 MEQ: 29.8 INJECTION, SOLUTION INTRAVENOUS at 18:51

## 2021-06-20 RX ADMIN — HEPARIN SODIUM 15 UNITS/KG/HR: 10000 INJECTION, SOLUTION INTRAVENOUS at 23:39

## 2021-06-20 RX ADMIN — HYDROCORTISONE SODIUM SUCCINATE 100 MG: 100 INJECTION, POWDER, FOR SOLUTION INTRAMUSCULAR; INTRAVENOUS at 23:51

## 2021-06-20 RX ADMIN — I.V. FAT EMULSION 250 ML: 20 EMULSION INTRAVENOUS at 21:32

## 2021-06-20 RX ADMIN — POTASSIUM CHLORIDE: 2 INJECTION, SOLUTION, CONCENTRATE INTRAVENOUS at 21:16

## 2021-06-20 RX ADMIN — FAMOTIDINE 20 MG: 10 INJECTION INTRAVENOUS at 08:24

## 2021-06-20 RX ADMIN — MEROPENEM 1 G: 1 INJECTION INTRAVENOUS at 11:49

## 2021-06-20 RX ADMIN — LYSINE, LEUCINE, PHENYLALANINE, VALINE, HISTIDINE, ISOLEUCINE, METHIONINE, THREONINE, TRYPTOPHAN, ALANINE, ARGININE, GLYCINE, PROLINE, GLUTAMIC ACID, SERINE, ASPARTIC ACID, TYROSINE
1.18; 1.04; 1.04; 960; 894; 749; 749; 749; 250; 2.17; 1.47; 1.04; 894; 749; 592; 434; 39 INJECTION, SOLUTION INTRAVENOUS at 21:24

## 2021-06-20 RX ADMIN — FUROSEMIDE 40 MG: 10 INJECTION, SOLUTION INTRAMUSCULAR; INTRAVENOUS at 08:24

## 2021-06-20 RX ADMIN — INSULIN HUMAN 2 UNITS: 100 INJECTION, SOLUTION PARENTERAL at 17:27

## 2021-06-20 RX ADMIN — INSULIN HUMAN 2 UNITS: 100 INJECTION, SOLUTION PARENTERAL at 05:52

## 2021-06-20 ASSESSMENT — PAIN SCALES - WONG BAKER
WONGBAKER_NUMERICALRESPONSE: 0

## 2021-06-20 ASSESSMENT — PAIN SCALES - GENERAL
PAINLEVEL_OUTOF10: 0

## 2021-06-21 ENCOUNTER — APPOINTMENT (OUTPATIENT)
Dept: GENERAL RADIOLOGY | Age: 86
DRG: 266 | End: 2021-06-21
Attending: INTERNAL MEDICINE

## 2021-06-21 LAB
ABO + RH BLD: NORMAL
ACANTHOCYTES BLD QL SMEAR: ABNORMAL
ALBUMIN SERPL-MCNC: 2 G/DL (ref 3.6–5.1)
ALBUMIN/GLOB SERPL: 0.6 {RATIO} (ref 1–2.4)
ALP SERPL-CCNC: 73 UNITS/L (ref 45–117)
ALT SERPL-CCNC: 345 UNITS/L
ANION GAP SERPL CALC-SCNC: 11 MMOL/L (ref 10–20)
APTT PPP: 40 SEC (ref 22–30)
APTT PPP: 80 SEC (ref 22–30)
AST SERPL-CCNC: 102 UNITS/L
BACTERIA BLD CULT: NORMAL
BACTERIA BLD CULT: NORMAL
BASE EXCESS / DEFICIT, ARTERIAL - RESPIRATORY: 0 MMOL/L (ref -2–3)
BASE EXCESS / DEFICIT, VENOUS - RESPIRATORY: 6 MMOL/L (ref -2–2)
BASOPHILS # BLD: 0 K/MCL (ref 0–0.3)
BASOPHILS # BLD: 0 K/MCL (ref 0–0.3)
BASOPHILS NFR BLD: 0 %
BASOPHILS NFR BLD: 0 %
BDY SITE: ABNORMAL
BILIRUB SERPL-MCNC: 0.7 MG/DL (ref 0.2–1)
BLD GP AB SCN SERPL QL GEL: NEGATIVE
BLOOD EXPIRATION DATE: NORMAL
BLOOD EXPIRATION DATE: NORMAL
BODY TEMPERATURE: 37 DEGREES
BODY TEMPERATURE: 37 DEGREES
BUN SERPL-MCNC: 60 MG/DL (ref 6–20)
BUN/CREAT SERPL: 56 (ref 7–25)
BURR CELLS BLD QL SMEAR: ABNORMAL
BURR CELLS BLD QL SMEAR: ABNORMAL
CA-I BLD-SCNC: 1.06 MMOL/L (ref 1.15–1.29)
CALCIUM SERPL-MCNC: 8 MG/DL (ref 8.4–10.2)
CHLORIDE BLD-SCNC: 101 MMOL/L (ref 98–107)
CHLORIDE SERPL-SCNC: 100 MMOL/L (ref 98–107)
CO2 SERPL-SCNC: 35 MMOL/L (ref 21–32)
COHGB MFR BLDV: 1.2 % (ref 1.5–15)
COHGB MFR BLDV: 1.4 % (ref 1.5–15)
CREAT SERPL-MCNC: 1.08 MG/DL (ref 0.51–0.95)
CROSSMATCH RESULT: NORMAL
CROSSMATCH RESULT: NORMAL
DEPRECATED RDW RBC: 51.4 FL (ref 39–50)
DEPRECATED RDW RBC: 54.1 FL (ref 39–50)
DISPENSE STATUS: NORMAL
DISPENSE STATUS: NORMAL
EOSINOPHIL # BLD: 0 K/MCL (ref 0–0.5)
EOSINOPHIL # BLD: 0 K/MCL (ref 0–0.5)
EOSINOPHIL NFR BLD: 0 %
EOSINOPHIL NFR BLD: 0 %
ERYTHROCYTE [DISTWIDTH] IN BLOOD: 14.8 % (ref 11–15)
ERYTHROCYTE [DISTWIDTH] IN BLOOD: 15.2 % (ref 11–15)
FASTING DURATION TIME PATIENT: ABNORMAL H
FASTING DURATION TIME PATIENT: NORMAL H
GFR SERPLBLD BASED ON 1.73 SQ M-ARVRAT: 45 ML/MIN/1.73M2
GLOBULIN SER-MCNC: 3.4 G/DL (ref 2–4)
GLUCOSE BLD-MCNC: 155 MG/DL (ref 65–99)
GLUCOSE BLDC GLUCOMTR-MCNC: 216 MG/DL (ref 70–99)
GLUCOSE BLDC GLUCOMTR-MCNC: 225 MG/DL (ref 70–99)
GLUCOSE BLDC GLUCOMTR-MCNC: 263 MG/DL (ref 70–99)
GLUCOSE SERPL-MCNC: 214 MG/DL (ref 65–99)
HCO3 BLDA-SCNC: 21 MMOL/L (ref 22–28)
HCO3 BLDV-SCNC: 30.5 MMOL/L (ref 22–28)
HCT VFR BLD CALC: 19.6 % (ref 36–46.5)
HCT VFR BLD CALC: 20 % (ref 36–46.5)
HCT VFR BLD CALC: 26.6 % (ref 36–46.5)
HCT VFR BLD CALC: 35 % (ref 36–46.5)
HGB BLD-MCNC: 11.7 G/DL (ref 12–15.5)
HGB BLD-MCNC: 6.1 G/DL (ref 12–15.5)
HGB BLD-MCNC: 6.6 G/DL (ref 12–15.5)
HGB BLD-MCNC: 8.5 G/DL (ref 12–15.5)
ISBT BLOOD TYPE: 1700
ISBT BLOOD TYPE: 1700
ISSUE DATE/TIME: NORMAL
ISSUE DATE/TIME: NORMAL
LACTATE BLDA-SCNC: 8 MMOL/L
LACTATE BLDV-SCNC: 4.2 MMOL/L
LYMPHOCYTES # BLD: 1.1 K/MCL (ref 1–4)
LYMPHOCYTES # BLD: 2.4 K/MCL (ref 1–4)
LYMPHOCYTES NFR BLD: 6 %
LYMPHOCYTES NFR BLD: 8 %
MAGNESIUM SERPL-MCNC: 2 MG/DL (ref 1.7–2.4)
MCH RBC QN AUTO: 30.6 PG (ref 26–34)
MCH RBC QN AUTO: 31 PG (ref 26–34)
MCHC RBC AUTO-ENTMCNC: 31.1 G/DL (ref 32–36.5)
MCHC RBC AUTO-ENTMCNC: 32 G/DL (ref 32–36.5)
MCV RBC AUTO: 95.7 FL (ref 78–100)
MCV RBC AUTO: 99.5 FL (ref 78–100)
METAMYELOCYTES NFR BLD: 3 % (ref 0–2)
METAMYELOCYTES NFR BLD: 3 % (ref 0–2)
METHGB MFR BLDMV: 0.7 %
METHGB MFR BLDMV: 0.9 %
MONOCYTES # BLD: 0.4 K/MCL (ref 0.3–0.9)
MONOCYTES # BLD: 0.6 K/MCL (ref 0.3–0.9)
MONOCYTES NFR BLD: 2 %
MONOCYTES NFR BLD: 2 %
MYELOCYTES # BLD MANUAL: 1 %
MYELOCYTES # BLD MANUAL: 1 %
NEUTROPHILS # BLD: 16.6 K/MCL (ref 1.8–7.7)
NEUTROPHILS # BLD: 25.3 K/MCL (ref 1.8–7.7)
NEUTS BAND NFR BLD: 2 % (ref 0–10)
NEUTS BAND NFR BLD: 7 % (ref 0–10)
NEUTS SEG NFR BLD: 79 %
NEUTS SEG NFR BLD: 86 %
NRBC BLD MANUAL-RTO: 11 /100 WBC
NRBC BLD MANUAL-RTO: 5 /100 WBC
OXYHGB MFR BLDA: 97.1 % (ref 94–98)
OXYHGB MFR BLDV: 53.8 % (ref 60–80)
PCO2 BLDA: 22 MM HG (ref 32–45)
PCO2 BLDV: 41 MM HG (ref 38–51)
PH BLDA: 7.59 UNITS (ref 7.35–7.45)
PH BLDV: 7.48 UNITS (ref 7.35–7.45)
PHOSPHATE SERPL-MCNC: 3.8 MG/DL (ref 2.4–4.7)
PLAT MORPH BLD: NORMAL
PLAT MORPH BLD: NORMAL
PLATELET # BLD AUTO: 117 K/MCL (ref 140–450)
PLATELET # BLD AUTO: 91 K/MCL (ref 140–450)
PO2 BLDA: 160 MM HG (ref 83–108)
PO2 BLDV: 29 MM HG (ref 35–42)
POLYCHROMASIA BLD QL SMEAR: ABNORMAL
POLYCHROMASIA BLD QL SMEAR: ABNORMAL
POTASSIUM BLD-SCNC: 4.4 MMOL/L (ref 3.4–5.1)
POTASSIUM SERPL-SCNC: 3.6 MMOL/L (ref 3.4–5.1)
PROCALCITONIN SERPL IA-MCNC: 0.51 NG/ML
PRODUCT CODE: NORMAL
PRODUCT CODE: NORMAL
PRODUCT DESCRIPTION: NORMAL
PRODUCT DESCRIPTION: NORMAL
PRODUCT ID: NORMAL
PRODUCT ID: NORMAL
PROT SERPL-MCNC: 5.4 G/DL (ref 6.4–8.2)
RBC # BLD: 1.97 MIL/MCL (ref 4–5.2)
RBC # BLD: 2.78 MIL/MCL (ref 4–5.2)
SAO2 % BLDA: 9 % (ref 15–23)
SAO2 % BLDA: 99 % (ref 95–99)
SAO2 DF BLDV: 55 % (ref 60–80)
SODIUM BLD-SCNC: 137 MMOL/L (ref 135–145)
SODIUM SERPL-SCNC: 142 MMOL/L (ref 135–145)
TRIGL SERPL-MCNC: 131 MG/DL
TYPE AND SCREEN EXPIRATION DATE: NORMAL
UNIT BLOOD TYPE: NORMAL
UNIT BLOOD TYPE: NORMAL
UNIT NUMBER: NORMAL
UNIT NUMBER: NORMAL
WBC # BLD: 18.9 K/MCL (ref 4.2–11)
WBC # BLD: 29.4 K/MCL (ref 4.2–11)
WBC MORPH BLD: NORMAL
WBC MORPH BLD: NORMAL

## 2021-06-21 PROCEDURE — 85027 COMPLETE CBC AUTOMATED: CPT | Performed by: INTERNAL MEDICINE

## 2021-06-21 PROCEDURE — C9113 INJ PANTOPRAZOLE SODIUM, VIA: HCPCS | Performed by: INTERNAL MEDICINE

## 2021-06-21 PROCEDURE — 85730 THROMBOPLASTIN TIME PARTIAL: CPT | Performed by: THORACIC SURGERY (CARDIOTHORACIC VASCULAR SURGERY)

## 2021-06-21 PROCEDURE — 10004281 HB COUNTER-STAFF TIME PER 15 MIN

## 2021-06-21 PROCEDURE — 80053 COMPREHEN METABOLIC PANEL: CPT | Performed by: HOSPITALIST

## 2021-06-21 PROCEDURE — 10002800 HB RX 250 W HCPCS: Performed by: INTERNAL MEDICINE

## 2021-06-21 PROCEDURE — 94640 AIRWAY INHALATION TREATMENT: CPT

## 2021-06-21 PROCEDURE — 84132 ASSAY OF SERUM POTASSIUM: CPT

## 2021-06-21 PROCEDURE — 83605 ASSAY OF LACTIC ACID: CPT

## 2021-06-21 PROCEDURE — 84145 PROCALCITONIN (PCT): CPT | Performed by: INTERNAL MEDICINE

## 2021-06-21 PROCEDURE — 10002801 HB RX 250 W/O HCPCS: Performed by: NURSE PRACTITIONER

## 2021-06-21 PROCEDURE — 10002800 HB RX 250 W HCPCS: Performed by: NURSE PRACTITIONER

## 2021-06-21 PROCEDURE — 82330 ASSAY OF CALCIUM: CPT

## 2021-06-21 PROCEDURE — 10002801 HB RX 250 W/O HCPCS: Performed by: INTERNAL MEDICINE

## 2021-06-21 PROCEDURE — 86850 RBC ANTIBODY SCREEN: CPT | Performed by: INTERNAL MEDICINE

## 2021-06-21 PROCEDURE — 85018 HEMOGLOBIN: CPT

## 2021-06-21 PROCEDURE — 10002800 HB RX 250 W HCPCS: Performed by: HOSPITALIST

## 2021-06-21 PROCEDURE — 85730 THROMBOPLASTIN TIME PARTIAL: CPT | Performed by: INTERNAL MEDICINE

## 2021-06-21 PROCEDURE — 10002807 HB RX 258: Performed by: INTERNAL MEDICINE

## 2021-06-21 PROCEDURE — 84100 ASSAY OF PHOSPHORUS: CPT | Performed by: INTERNAL MEDICINE

## 2021-06-21 PROCEDURE — 83735 ASSAY OF MAGNESIUM: CPT | Performed by: INTERNAL MEDICINE

## 2021-06-21 PROCEDURE — 85027 COMPLETE CBC AUTOMATED: CPT | Performed by: HOSPITALIST

## 2021-06-21 PROCEDURE — 82375 ASSAY CARBOXYHB QUANT: CPT

## 2021-06-21 PROCEDURE — 10002805 HB CONTRAST AGENT: Performed by: INTERNAL MEDICINE

## 2021-06-21 PROCEDURE — 10002800 HB RX 250 W HCPCS: Performed by: EMERGENCY MEDICINE

## 2021-06-21 PROCEDURE — 86923 COMPATIBILITY TEST ELECTRIC: CPT

## 2021-06-21 PROCEDURE — 10002801 HB RX 250 W/O HCPCS: Performed by: THORACIC SURGERY (CARDIOTHORACIC VASCULAR SURGERY)

## 2021-06-21 PROCEDURE — 10002803 HB RX 637: Performed by: EMERGENCY MEDICINE

## 2021-06-21 PROCEDURE — 74220 X-RAY XM ESOPHAGUS 1CNTRST: CPT

## 2021-06-21 PROCEDURE — 71045 X-RAY EXAM CHEST 1 VIEW: CPT

## 2021-06-21 PROCEDURE — 10002807 HB RX 258: Performed by: NURSE PRACTITIONER

## 2021-06-21 PROCEDURE — 10000008 HB ROOM CHARGE ICU OR CCU

## 2021-06-21 PROCEDURE — P9016 RBC LEUKOCYTES REDUCED: HCPCS

## 2021-06-21 PROCEDURE — 82947 ASSAY GLUCOSE BLOOD QUANT: CPT

## 2021-06-21 PROCEDURE — 84478 ASSAY OF TRIGLYCERIDES: CPT | Performed by: INTERNAL MEDICINE

## 2021-06-21 RX ORDER — PANTOPRAZOLE SODIUM 40 MG/10ML
40 INJECTION, POWDER, LYOPHILIZED, FOR SOLUTION INTRAVENOUS EVERY 12 HOURS SCHEDULED
Status: DISCONTINUED | OUTPATIENT
Start: 2021-06-21 | End: 2021-07-08 | Stop reason: HOSPADM

## 2021-06-21 RX ORDER — POTASSIUM CHLORIDE 14.9 MG/ML
20 INJECTION INTRAVENOUS ONCE
Status: COMPLETED | OUTPATIENT
Start: 2021-06-21 | End: 2021-06-21

## 2021-06-21 RX ORDER — NOREPINEPHRINE BITARTRATE 0.03 MG/ML
0-30 INJECTION, SOLUTION INTRAVENOUS CONTINUOUS
Status: DISCONTINUED | OUTPATIENT
Start: 2021-06-21 | End: 2021-06-23

## 2021-06-21 RX ORDER — SODIUM CHLORIDE 9 MG/ML
INJECTION, SOLUTION INTRAVENOUS CONTINUOUS PRN
Status: DISCONTINUED | OUTPATIENT
Start: 2021-06-21 | End: 2021-07-08 | Stop reason: HOSPADM

## 2021-06-21 RX ORDER — POTASSIUM CHLORIDE 29.8 MG/ML
20 INJECTION INTRAVENOUS ONCE
Status: COMPLETED | OUTPATIENT
Start: 2021-06-21 | End: 2021-06-21

## 2021-06-21 RX ORDER — ONDANSETRON 2 MG/ML
INJECTION INTRAMUSCULAR; INTRAVENOUS
Status: DISCONTINUED
Start: 2021-06-21 | End: 2021-06-21 | Stop reason: WASHOUT

## 2021-06-21 RX ORDER — SODIUM CHLORIDE 9 MG/ML
INJECTION, SOLUTION INTRAVENOUS CONTINUOUS PRN
Status: DISCONTINUED | OUTPATIENT
Start: 2021-06-21 | End: 2021-06-28 | Stop reason: SDUPTHER

## 2021-06-21 RX ORDER — METOCLOPRAMIDE HYDROCHLORIDE 5 MG/ML
5 INJECTION INTRAMUSCULAR; INTRAVENOUS EVERY 8 HOURS PRN
Status: DISCONTINUED | OUTPATIENT
Start: 2021-06-21 | End: 2021-07-08 | Stop reason: HOSPADM

## 2021-06-21 RX ORDER — INSULIN GLARGINE 100 [IU]/ML
5 INJECTION, SOLUTION SUBCUTANEOUS EVERY EVENING
Status: DISCONTINUED | OUTPATIENT
Start: 2021-06-21 | End: 2021-07-07

## 2021-06-21 RX ORDER — LIDOCAINE 4 G/G
1 PATCH TOPICAL DAILY
Status: DISCONTINUED | OUTPATIENT
Start: 2021-06-21 | End: 2021-07-08 | Stop reason: HOSPADM

## 2021-06-21 RX ADMIN — Medication 5 MCG/MIN: at 19:05

## 2021-06-21 RX ADMIN — POTASSIUM CHLORIDE 20 MEQ: 29.8 INJECTION, SOLUTION INTRAVENOUS at 11:04

## 2021-06-21 RX ADMIN — INSULIN HUMAN 6 UNITS: 100 INJECTION, SOLUTION PARENTERAL at 11:11

## 2021-06-21 RX ADMIN — SODIUM CHLORIDE 250 ML: 0.9 INJECTION, SOLUTION INTRAVENOUS at 17:56

## 2021-06-21 RX ADMIN — AMIODARONE HYDROCHLORIDE 0.5 MG/MIN: 50 INJECTION, SOLUTION INTRAVENOUS at 22:17

## 2021-06-21 RX ADMIN — ALBUTEROL SULFATE 2.5 MG: 2.5 SOLUTION RESPIRATORY (INHALATION) at 08:14

## 2021-06-21 RX ADMIN — PANTOPRAZOLE SODIUM 40 MG: 40 INJECTION, POWDER, FOR SOLUTION INTRAVENOUS at 20:24

## 2021-06-21 RX ADMIN — FENTANYL CITRATE 25 MCG: 50 INJECTION INTRAMUSCULAR; INTRAVENOUS at 09:16

## 2021-06-21 RX ADMIN — INSULIN HUMAN 4 UNITS: 100 INJECTION, SOLUTION PARENTERAL at 17:56

## 2021-06-21 RX ADMIN — IOHEXOL 100 ML: 350 INJECTION, SOLUTION INTRAVENOUS at 09:04

## 2021-06-21 RX ADMIN — FAMOTIDINE 20 MG: 10 INJECTION INTRAVENOUS at 08:00

## 2021-06-21 RX ADMIN — MEROPENEM 1 G: 1 INJECTION INTRAVENOUS at 11:15

## 2021-06-21 RX ADMIN — I.V. FAT EMULSION 250 ML: 20 EMULSION INTRAVENOUS at 20:55

## 2021-06-21 RX ADMIN — FUROSEMIDE 40 MG: 10 INJECTION, SOLUTION INTRAMUSCULAR; INTRAVENOUS at 08:00

## 2021-06-21 RX ADMIN — FENTANYL CITRATE 25 MCG: 50 INJECTION INTRAMUSCULAR; INTRAVENOUS at 02:15

## 2021-06-21 RX ADMIN — FENTANYL CITRATE 25 MCG: 50 INJECTION INTRAMUSCULAR; INTRAVENOUS at 11:13

## 2021-06-21 RX ADMIN — INSULIN GLARGINE 5 UNITS: 100 INJECTION, SOLUTION SUBCUTANEOUS at 15:13

## 2021-06-21 RX ADMIN — HYDROCORTISONE SODIUM SUCCINATE 100 MG: 100 INJECTION, POWDER, FOR SOLUTION INTRAMUSCULAR; INTRAVENOUS at 21:29

## 2021-06-21 RX ADMIN — HEPARIN SODIUM 14 UNITS/KG/HR: 10000 INJECTION, SOLUTION INTRAVENOUS at 13:50

## 2021-06-21 RX ADMIN — LIDOCAINE 1 PATCH: 246 PATCH TOPICAL at 08:00

## 2021-06-21 RX ADMIN — DIPHENHYDRAMINE HYDROCHLORIDE 25 MG: 50 INJECTION INTRAMUSCULAR; INTRAVENOUS at 07:48

## 2021-06-21 RX ADMIN — LYSINE, LEUCINE, PHENYLALANINE, VALINE, HISTIDINE, ISOLEUCINE, METHIONINE, THREONINE, TRYPTOPHAN, ALANINE, ARGININE, GLYCINE, PROLINE, GLUTAMIC ACID, SERINE, ASPARTIC ACID, TYROSINE
1.18; 1.04; 1.04; 960; 894; 749; 749; 749; 250; 2.17; 1.47; 1.04; 894; 749; 592; 434; 39 INJECTION, SOLUTION INTRAVENOUS at 20:53

## 2021-06-21 RX ADMIN — INSULIN HUMAN 4 UNITS: 100 INJECTION, SOLUTION PARENTERAL at 06:10

## 2021-06-21 RX ADMIN — HYDROCORTISONE SODIUM SUCCINATE 100 MG: 100 INJECTION, POWDER, FOR SOLUTION INTRAMUSCULAR; INTRAVENOUS at 04:17

## 2021-06-21 RX ADMIN — POTASSIUM CHLORIDE 20 MEQ: 14.9 INJECTION, SOLUTION INTRAVENOUS at 07:59

## 2021-06-21 ASSESSMENT — PAIN SCALES - GENERAL
PAINLEVEL_OUTOF10: 0
PAINLEVEL_OUTOF10: 2

## 2021-06-21 ASSESSMENT — PAIN SCALES - WONG BAKER
WONGBAKER_NUMERICALRESPONSE: 0
WONGBAKER_NUMERICALRESPONSE: 0

## 2021-06-22 ENCOUNTER — APPOINTMENT (OUTPATIENT)
Dept: ULTRASOUND IMAGING | Age: 86
DRG: 266 | End: 2021-06-22
Attending: INTERNAL MEDICINE

## 2021-06-22 ENCOUNTER — APPOINTMENT (OUTPATIENT)
Dept: CT IMAGING | Age: 86
DRG: 266 | End: 2021-06-22
Attending: INTERNAL MEDICINE

## 2021-06-22 LAB
ALBUMIN SERPL-MCNC: 1.9 G/DL (ref 3.6–5.1)
ALBUMIN/GLOB SERPL: 0.7 {RATIO} (ref 1–2.4)
ALP SERPL-CCNC: 81 UNITS/L (ref 45–117)
ALT SERPL-CCNC: 2380 UNITS/L
ANION GAP SERPL CALC-SCNC: 11 MMOL/L (ref 10–20)
ANION GAP SERPL CALC-SCNC: 13 MMOL/L (ref 10–20)
APTT PPP: 25 SEC (ref 22–30)
AST SERPL-CCNC: 3062 UNITS/L
BASOPHILS # BLD: 0 K/MCL (ref 0–0.3)
BASOPHILS NFR BLD: 0 %
BILIRUB SERPL-MCNC: 1.4 MG/DL (ref 0.2–1)
BUN SERPL-MCNC: 88 MG/DL (ref 6–20)
BUN SERPL-MCNC: 97 MG/DL (ref 6–20)
BUN/CREAT SERPL: 51 (ref 7–25)
BUN/CREAT SERPL: 53 (ref 7–25)
BURR CELLS BLD QL SMEAR: ABNORMAL
CALCIUM SERPL-MCNC: 7.5 MG/DL (ref 8.4–10.2)
CALCIUM SERPL-MCNC: 7.8 MG/DL (ref 8.4–10.2)
CHLORIDE SERPL-SCNC: 103 MMOL/L (ref 98–107)
CHLORIDE SERPL-SCNC: 104 MMOL/L (ref 98–107)
CO2 SERPL-SCNC: 30 MMOL/L (ref 21–32)
CO2 SERPL-SCNC: 31 MMOL/L (ref 21–32)
CREAT SERPL-MCNC: 1.71 MG/DL (ref 0.51–0.95)
CREAT SERPL-MCNC: 1.83 MG/DL (ref 0.51–0.95)
DEPRECATED RDW RBC: 50.8 FL (ref 39–50)
EOSINOPHIL # BLD: 0 K/MCL (ref 0–0.5)
EOSINOPHIL NFR BLD: 0 %
ERYTHROCYTE [DISTWIDTH] IN BLOOD: 16.7 % (ref 11–15)
FASTING DURATION TIME PATIENT: ABNORMAL H
FASTING DURATION TIME PATIENT: ABNORMAL H
GFR SERPLBLD BASED ON 1.73 SQ M-ARVRAT: 24 ML/MIN/1.73M2
GFR SERPLBLD BASED ON 1.73 SQ M-ARVRAT: 26 ML/MIN/1.73M2
GLOBULIN SER-MCNC: 2.8 G/DL (ref 2–4)
GLUCOSE BLDC GLUCOMTR-MCNC: 124 MG/DL (ref 70–99)
GLUCOSE BLDC GLUCOMTR-MCNC: 141 MG/DL (ref 70–99)
GLUCOSE BLDC GLUCOMTR-MCNC: 150 MG/DL (ref 70–99)
GLUCOSE BLDC GLUCOMTR-MCNC: 164 MG/DL (ref 70–99)
GLUCOSE SERPL-MCNC: 160 MG/DL (ref 65–99)
GLUCOSE SERPL-MCNC: 174 MG/DL (ref 65–99)
HCT VFR BLD CALC: 31.1 % (ref 36–46.5)
HGB BLD-MCNC: 11 G/DL (ref 12–15.5)
LACTATE BLDV-SCNC: 1.4 MMOL/L (ref 0–2)
LACTATE BLDV-SCNC: 2.1 MMOL/L (ref 0–2)
LG PLATELETS BLD QL SMEAR: PRESENT
LYMPHOCYTES # BLD: 0.8 K/MCL (ref 1–4)
LYMPHOCYTES NFR BLD: 2 %
MAGNESIUM SERPL-MCNC: 2 MG/DL (ref 1.7–2.4)
MCH RBC QN AUTO: 30.3 PG (ref 26–34)
MCHC RBC AUTO-ENTMCNC: 35.4 G/DL (ref 32–36.5)
MCV RBC AUTO: 85.7 FL (ref 78–100)
METAMYELOCYTES NFR BLD: 1 % (ref 0–2)
MONOCYTES # BLD: 0.8 K/MCL (ref 0.3–0.9)
MONOCYTES NFR BLD: 2 %
MRSA DNA SPEC QL NAA+PROBE: NOT DETECTED
MYELOCYTES # BLD MANUAL: 3 %
NEUTROPHILS # BLD: 35.1 K/MCL (ref 1.8–7.7)
NEUTS BAND NFR BLD: 2 % (ref 0–10)
NEUTS SEG NFR BLD: 90 %
NRBC BLD MANUAL-RTO: 9 /100 WBC
PHOSPHATE SERPL-MCNC: 6.1 MG/DL (ref 2.4–4.7)
PLATELET # BLD AUTO: 101 K/MCL (ref 140–450)
POLYCHROMASIA BLD QL SMEAR: ABNORMAL
POTASSIUM SERPL-SCNC: 4.6 MMOL/L (ref 3.4–5.1)
POTASSIUM SERPL-SCNC: 4.8 MMOL/L (ref 3.4–5.1)
PROT SERPL-MCNC: 4.7 G/DL (ref 6.4–8.2)
RBC # BLD: 3.63 MIL/MCL (ref 4–5.2)
SODIUM SERPL-SCNC: 141 MMOL/L (ref 135–145)
SODIUM SERPL-SCNC: 141 MMOL/L (ref 135–145)
WBC # BLD: 38.1 K/MCL (ref 4.2–11)
WBC TOXIC VACUOLES BLD QL SMEAR: PRESENT

## 2021-06-22 PROCEDURE — 80048 BASIC METABOLIC PNL TOTAL CA: CPT | Performed by: INTERNAL MEDICINE

## 2021-06-22 PROCEDURE — 85730 THROMBOPLASTIN TIME PARTIAL: CPT | Performed by: INTERNAL MEDICINE

## 2021-06-22 PROCEDURE — 10002807 HB RX 258: Performed by: INTERNAL MEDICINE

## 2021-06-22 PROCEDURE — 84100 ASSAY OF PHOSPHORUS: CPT

## 2021-06-22 PROCEDURE — 97168 OT RE-EVAL EST PLAN CARE: CPT

## 2021-06-22 PROCEDURE — 10002800 HB RX 250 W HCPCS: Performed by: NURSE PRACTITIONER

## 2021-06-22 PROCEDURE — 85027 COMPLETE CBC AUTOMATED: CPT | Performed by: HOSPITALIST

## 2021-06-22 PROCEDURE — G1004 CDSM NDSC: HCPCS

## 2021-06-22 PROCEDURE — 87641 MR-STAPH DNA AMP PROBE: CPT | Performed by: INTERNAL MEDICINE

## 2021-06-22 PROCEDURE — 10002800 HB RX 250 W HCPCS: Performed by: HOSPITALIST

## 2021-06-22 PROCEDURE — 97530 THERAPEUTIC ACTIVITIES: CPT

## 2021-06-22 PROCEDURE — 10002803 HB RX 637: Performed by: EMERGENCY MEDICINE

## 2021-06-22 PROCEDURE — C9113 INJ PANTOPRAZOLE SODIUM, VIA: HCPCS | Performed by: INTERNAL MEDICINE

## 2021-06-22 PROCEDURE — 83735 ASSAY OF MAGNESIUM: CPT

## 2021-06-22 PROCEDURE — 10004281 HB COUNTER-STAFF TIME PER 15 MIN

## 2021-06-22 PROCEDURE — 10002800 HB RX 250 W HCPCS: Performed by: INTERNAL MEDICINE

## 2021-06-22 PROCEDURE — 10002807 HB RX 258: Performed by: NURSE PRACTITIONER

## 2021-06-22 PROCEDURE — 83605 ASSAY OF LACTIC ACID: CPT | Performed by: INTERNAL MEDICINE

## 2021-06-22 PROCEDURE — 97164 PT RE-EVAL EST PLAN CARE: CPT

## 2021-06-22 PROCEDURE — 94640 AIRWAY INHALATION TREATMENT: CPT

## 2021-06-22 PROCEDURE — 10002801 HB RX 250 W/O HCPCS: Performed by: THORACIC SURGERY (CARDIOTHORACIC VASCULAR SURGERY)

## 2021-06-22 PROCEDURE — 74176 CT ABD & PELVIS W/O CONTRAST: CPT

## 2021-06-22 PROCEDURE — 10002801 HB RX 250 W/O HCPCS: Performed by: INTERNAL MEDICINE

## 2021-06-22 PROCEDURE — 80053 COMPREHEN METABOLIC PANEL: CPT | Performed by: HOSPITALIST

## 2021-06-22 PROCEDURE — 93926 LOWER EXTREMITY STUDY: CPT

## 2021-06-22 PROCEDURE — 10000008 HB ROOM CHARGE ICU OR CCU

## 2021-06-22 RX ADMIN — PANTOPRAZOLE SODIUM 40 MG: 40 INJECTION, POWDER, FOR SOLUTION INTRAVENOUS at 19:22

## 2021-06-22 RX ADMIN — MEROPENEM 1 G: 1 INJECTION INTRAVENOUS at 01:59

## 2021-06-22 RX ADMIN — MEROPENEM 1 G: 1 INJECTION INTRAVENOUS at 12:33

## 2021-06-22 RX ADMIN — LIDOCAINE 1 PATCH: 246 PATCH TOPICAL at 09:28

## 2021-06-22 RX ADMIN — INSULIN GLARGINE 5 UNITS: 100 INJECTION, SOLUTION SUBCUTANEOUS at 17:54

## 2021-06-22 RX ADMIN — I.V. FAT EMULSION 250 ML: 20 EMULSION INTRAVENOUS at 22:19

## 2021-06-22 RX ADMIN — ALBUTEROL SULFATE 2.5 MG: 2.5 SOLUTION RESPIRATORY (INHALATION) at 20:22

## 2021-06-22 RX ADMIN — ALBUTEROL SULFATE 2.5 MG: 2.5 SOLUTION RESPIRATORY (INHALATION) at 08:54

## 2021-06-22 RX ADMIN — PANTOPRAZOLE SODIUM 40 MG: 40 INJECTION, POWDER, FOR SOLUTION INTRAVENOUS at 09:29

## 2021-06-22 RX ADMIN — LYSINE, LEUCINE, PHENYLALANINE, VALINE, HISTIDINE, ISOLEUCINE, METHIONINE, THREONINE, TRYPTOPHAN, ALANINE, ARGININE, GLYCINE, PROLINE, GLUTAMIC ACID, SERINE, ASPARTIC ACID, TYROSINE
1.18; 1.04; 1.04; 960; 894; 749; 749; 749; 250; 2.17; 1.47; 1.04; 894; 749; 592; 434; 39 INJECTION, SOLUTION INTRAVENOUS at 22:11

## 2021-06-22 RX ADMIN — INSULIN HUMAN 2 UNITS: 100 INJECTION, SOLUTION PARENTERAL at 12:31

## 2021-06-22 RX ADMIN — INSULIN HUMAN 2 UNITS: 100 INJECTION, SOLUTION PARENTERAL at 17:54

## 2021-06-22 ASSESSMENT — PAIN SCALES - GENERAL
PAINLEVEL_OUTOF10: 0

## 2021-06-22 ASSESSMENT — MOVEMENT AND STRENGTH ASSESSMENTS
RLE_ASSESSMENT: FAIR/COMPLETE ROM AGAINST GRAVITY, NO ADDED RESISTANCE
LLE_ASSESSMENT: FAIR/COMPLETE ROM AGAINST GRAVITY, NO ADDED RESISTANCE
LUE_ASSESSMENT: FAIR/COMPLETE ROM AGAINST GRAVITY, NO ADDED RESISTANCE
RUE_ASSESSMENT: FAIR/COMPLETE ROM AGAINST GRAVITY, NO ADDED RESISTANCE
LUE_ASSESSMENT: FAIR/COMPLETE ROM AGAINST GRAVITY, NO ADDED RESISTANCE
RLE_ASSESSMENT: FAIR/COMPLETE ROM AGAINST GRAVITY, NO ADDED RESISTANCE
LLE_ASSESSMENT: FAIR/COMPLETE ROM AGAINST GRAVITY, NO ADDED RESISTANCE
RUE_ASSESSMENT: FAIR/COMPLETE ROM AGAINST GRAVITY, NO ADDED RESISTANCE

## 2021-06-22 ASSESSMENT — COGNITIVE AND FUNCTIONAL STATUS - GENERAL
REMEMBERING 5 ERRANDS WITH NO LIST: A LOT
BASIC_MOBILITY_RAW_SCORE: 8
DAILY_ACTIVITY_CONVERTED_SCORE: 22.86
DAILY_ACTIVITY_RAW_SCORE: 8
APPLIED_COGNITIVE_RAW_SCORE: 18
HELP NEEDED DRESSING REGULAR UPPER BODY CLOTHING: TOTAL
HELP NEEDED FOR BATHING: TOTAL
APPLIED_COGNITIVE_CONVERTED_SCORE: 38.07
REMEMBERING TO TAKE MEDICATION: A LITTLE
HELP NEEDED FOR PERSONAL GROOMING: A LOT
TAKING CARE OF COMPLICATED TASKS: A LOT
REMEMBERING WHERE THINGS ARE: A LITTLE
HELP NEEDED FOR TOILETING: TOTAL
HELP NEEDED DRESSING REGULAR LOWER BODY CLOTHING: TOTAL
BASIC_MOBILITY_CONVERTED_SCORE: 22.61

## 2021-06-22 ASSESSMENT — PAIN SCALES - WONG BAKER
WONGBAKER_NUMERICALRESPONSE: 0

## 2021-06-22 ASSESSMENT — ENCOUNTER SYMPTOMS: PAIN SEVERITY NOW: 0

## 2021-06-23 LAB
ALBUMIN SERPL-MCNC: 1.9 G/DL (ref 3.6–5.1)
ALBUMIN/GLOB SERPL: 0.7 {RATIO} (ref 1–2.4)
ALP SERPL-CCNC: 79 UNITS/L (ref 45–117)
ALT SERPL-CCNC: 2001 UNITS/L
ANION GAP SERPL CALC-SCNC: 13 MMOL/L (ref 10–20)
APTT PPP: 23 SEC (ref 22–30)
AST SERPL-CCNC: 1104 UNITS/L
BASOPHILS # BLD: 0 K/MCL (ref 0–0.3)
BASOPHILS NFR BLD: 0 %
BILIRUB SERPL-MCNC: 1 MG/DL (ref 0.2–1)
BUN SERPL-MCNC: 99 MG/DL (ref 6–20)
BUN/CREAT SERPL: 54 (ref 7–25)
BURR CELLS BLD QL SMEAR: ABNORMAL
CALCIUM SERPL-MCNC: 7.6 MG/DL (ref 8.4–10.2)
CHLORIDE SERPL-SCNC: 106 MMOL/L (ref 98–107)
CO2 SERPL-SCNC: 29 MMOL/L (ref 21–32)
CREAT SERPL-MCNC: 1.84 MG/DL (ref 0.51–0.95)
DEPRECATED RDW RBC: 52.1 FL (ref 39–50)
EOSINOPHIL # BLD: 0 K/MCL (ref 0–0.5)
EOSINOPHIL NFR BLD: 0 %
ERYTHROCYTE [DISTWIDTH] IN BLOOD: 17.1 % (ref 11–15)
FASTING DURATION TIME PATIENT: ABNORMAL H
GFR SERPLBLD BASED ON 1.73 SQ M-ARVRAT: 24 ML/MIN/1.73M2
GLOBULIN SER-MCNC: 2.7 G/DL (ref 2–4)
GLUCOSE BLDC GLUCOMTR-MCNC: 119 MG/DL (ref 70–99)
GLUCOSE BLDC GLUCOMTR-MCNC: 129 MG/DL (ref 70–99)
GLUCOSE BLDC GLUCOMTR-MCNC: 131 MG/DL (ref 70–99)
GLUCOSE BLDC GLUCOMTR-MCNC: 140 MG/DL (ref 70–99)
GLUCOSE BLDC GLUCOMTR-MCNC: 153 MG/DL (ref 70–99)
GLUCOSE SERPL-MCNC: 131 MG/DL (ref 65–99)
HCT VFR BLD CALC: 26.2 % (ref 36–46.5)
HGB BLD-MCNC: 9.2 G/DL (ref 12–15.5)
INR PPP: 1.1
LG PLATELETS BLD QL SMEAR: PRESENT
LYMPHOCYTES # BLD: 1.2 K/MCL (ref 1–4)
LYMPHOCYTES NFR BLD: 4 %
MAGNESIUM SERPL-MCNC: 2.3 MG/DL (ref 1.7–2.4)
MCH RBC QN AUTO: 30.6 PG (ref 26–34)
MCHC RBC AUTO-ENTMCNC: 35.1 G/DL (ref 32–36.5)
MCV RBC AUTO: 87 FL (ref 78–100)
MONOCYTES # BLD: 0.9 K/MCL (ref 0.3–0.9)
MONOCYTES NFR BLD: 3 %
NEUTROPHILS # BLD: 28.6 K/MCL (ref 1.8–7.7)
NEUTS BAND NFR BLD: 1 % (ref 0–10)
NEUTS SEG NFR BLD: 92 %
NRBC BLD MANUAL-RTO: 2 /100 WBC
PHOSPHATE SERPL-MCNC: 6.2 MG/DL (ref 2.4–4.7)
PLATELET # BLD AUTO: 80 K/MCL (ref 140–450)
POLYCHROMASIA BLD QL SMEAR: ABNORMAL
POTASSIUM SERPL-SCNC: 4.3 MMOL/L (ref 3.4–5.1)
PROT SERPL-MCNC: 4.6 G/DL (ref 6.4–8.2)
PROTHROMBIN TIME: 11.9 SEC (ref 9.7–11.8)
RBC # BLD: 3.01 MIL/MCL (ref 4–5.2)
SODIUM SERPL-SCNC: 144 MMOL/L (ref 135–145)
WBC # BLD: 30.8 K/MCL (ref 4.2–11)
WBC TOXIC VACUOLES BLD QL SMEAR: PRESENT

## 2021-06-23 PROCEDURE — 80053 COMPREHEN METABOLIC PANEL: CPT | Performed by: HOSPITALIST

## 2021-06-23 PROCEDURE — 84100 ASSAY OF PHOSPHORUS: CPT

## 2021-06-23 PROCEDURE — 10002800 HB RX 250 W HCPCS: Performed by: INTERNAL MEDICINE

## 2021-06-23 PROCEDURE — 10002800 HB RX 250 W HCPCS: Performed by: NURSE PRACTITIONER

## 2021-06-23 PROCEDURE — 92610 EVALUATE SWALLOWING FUNCTION: CPT

## 2021-06-23 PROCEDURE — C9113 INJ PANTOPRAZOLE SODIUM, VIA: HCPCS | Performed by: INTERNAL MEDICINE

## 2021-06-23 PROCEDURE — 10002800 HB RX 250 W HCPCS: Performed by: HOSPITALIST

## 2021-06-23 PROCEDURE — 97110 THERAPEUTIC EXERCISES: CPT

## 2021-06-23 PROCEDURE — 10002807 HB RX 258: Performed by: INTERNAL MEDICINE

## 2021-06-23 PROCEDURE — 10002800 HB RX 250 W HCPCS

## 2021-06-23 PROCEDURE — 10003585 HB ROOM CHARGE INTERMEDIATE CARE

## 2021-06-23 PROCEDURE — 10002807 HB RX 258: Performed by: NURSE PRACTITIONER

## 2021-06-23 PROCEDURE — 85730 THROMBOPLASTIN TIME PARTIAL: CPT | Performed by: INTERNAL MEDICINE

## 2021-06-23 PROCEDURE — 85610 PROTHROMBIN TIME: CPT | Performed by: INTERNAL MEDICINE

## 2021-06-23 PROCEDURE — 97530 THERAPEUTIC ACTIVITIES: CPT

## 2021-06-23 PROCEDURE — 83735 ASSAY OF MAGNESIUM: CPT

## 2021-06-23 PROCEDURE — 85027 COMPLETE CBC AUTOMATED: CPT | Performed by: HOSPITALIST

## 2021-06-23 PROCEDURE — 10002803 HB RX 637: Performed by: EMERGENCY MEDICINE

## 2021-06-23 RX ADMIN — LIDOCAINE 1 PATCH: 246 PATCH TOPICAL at 08:04

## 2021-06-23 RX ADMIN — INSULIN GLARGINE 5 UNITS: 100 INJECTION, SOLUTION SUBCUTANEOUS at 17:19

## 2021-06-23 RX ADMIN — MEROPENEM 1 G: 1 INJECTION INTRAVENOUS at 12:44

## 2021-06-23 RX ADMIN — PANTOPRAZOLE SODIUM 40 MG: 40 INJECTION, POWDER, FOR SOLUTION INTRAVENOUS at 19:52

## 2021-06-23 RX ADMIN — PANTOPRAZOLE SODIUM 40 MG: 40 INJECTION, POWDER, FOR SOLUTION INTRAVENOUS at 08:03

## 2021-06-23 RX ADMIN — MORPHINE SULFATE 2 MG: 2 INJECTION, SOLUTION INTRAMUSCULAR; INTRAVENOUS at 15:34

## 2021-06-23 RX ADMIN — AMIODARONE HYDROCHLORIDE 0.5 MG/MIN: 50 INJECTION, SOLUTION INTRAVENOUS at 21:21

## 2021-06-23 RX ADMIN — INSULIN HUMAN 2 UNITS: 100 INJECTION, SOLUTION PARENTERAL at 17:17

## 2021-06-23 RX ADMIN — LYSINE, LEUCINE, PHENYLALANINE, VALINE, HISTIDINE, ISOLEUCINE, METHIONINE, THREONINE, TRYPTOPHAN, ALANINE, ARGININE, GLYCINE, PROLINE, GLUTAMIC ACID, SERINE, ASPARTIC ACID, TYROSINE
1.18; 1.04; 1.04; 960; 894; 749; 749; 749; 250; 2.17; 1.47; 1.04; 894; 749; 592; 434; 39 INJECTION, SOLUTION INTRAVENOUS at 21:51

## 2021-06-23 RX ADMIN — AMIODARONE HYDROCHLORIDE 0.5 MG/MIN: 50 INJECTION, SOLUTION INTRAVENOUS at 05:58

## 2021-06-23 RX ADMIN — MEROPENEM 1 G: 1 INJECTION INTRAVENOUS at 00:47

## 2021-06-23 RX ADMIN — I.V. FAT EMULSION 250 ML: 20 EMULSION INTRAVENOUS at 21:52

## 2021-06-23 RX ADMIN — MEROPENEM 1 G: 1 INJECTION INTRAVENOUS at 23:47

## 2021-06-23 ASSESSMENT — COGNITIVE AND FUNCTIONAL STATUS - GENERAL
HELP NEEDED FOR TOILETING: TOTAL
DAILY_ACTIVITY_RAW_SCORE: 8
APPLIED_COGNITIVE_RAW_SCORE: 18
REMEMBERING TO TAKE MEDICATION: A LITTLE
HELP NEEDED FOR PERSONAL GROOMING: A LOT
HELP NEEDED DRESSING REGULAR UPPER BODY CLOTHING: TOTAL
DAILY_ACTIVITY_CONVERTED_SCORE: 22.86
REMEMBERING WHERE THINGS ARE: A LITTLE
APPLIED_COGNITIVE_CONVERTED_SCORE: 38.07
TAKING CARE OF COMPLICATED TASKS: A LOT
HELP NEEDED FOR BATHING: TOTAL
REMEMBERING 5 ERRANDS WITH NO LIST: A LOT
HELP NEEDED DRESSING REGULAR LOWER BODY CLOTHING: TOTAL

## 2021-06-23 ASSESSMENT — PAIN SCALES - GENERAL
PAINLEVEL_OUTOF10: 0

## 2021-06-23 ASSESSMENT — MOVEMENT AND STRENGTH ASSESSMENTS
RUE_ASSESSMENT: FAIR/COMPLETE ROM AGAINST GRAVITY, NO ADDED RESISTANCE
RLE_ASSESSMENT: FAIR/COMPLETE ROM AGAINST GRAVITY, NO ADDED RESISTANCE
RLE_ASSESSMENT: FAIR/COMPLETE ROM AGAINST GRAVITY, NO ADDED RESISTANCE
LUE_ASSESSMENT: FAIR/COMPLETE ROM AGAINST GRAVITY, NO ADDED RESISTANCE
LLE_ASSESSMENT: FAIR/COMPLETE ROM AGAINST GRAVITY, NO ADDED RESISTANCE
LUE_ASSESSMENT: FAIR/COMPLETE ROM AGAINST GRAVITY, NO ADDED RESISTANCE
LLE_ASSESSMENT: FAIR/COMPLETE ROM AGAINST GRAVITY, NO ADDED RESISTANCE
RUE_ASSESSMENT: FAIR/COMPLETE ROM AGAINST GRAVITY, NO ADDED RESISTANCE

## 2021-06-23 ASSESSMENT — PAIN SCALES - BEHAVIORAL PAIN SCALE (BPS): BPS_SCORE: 3

## 2021-06-23 ASSESSMENT — PAIN SCALES - WONG BAKER: WONGBAKER_NUMERICALRESPONSE: 0

## 2021-06-24 LAB
ALBUMIN SERPL-MCNC: 1.8 G/DL (ref 3.6–5.1)
ALBUMIN/GLOB SERPL: 0.6 {RATIO} (ref 1–2.4)
ALP SERPL-CCNC: 80 UNITS/L (ref 45–117)
ALT SERPL-CCNC: 1212 UNITS/L
ANION GAP SERPL CALC-SCNC: 10 MMOL/L (ref 10–20)
APTT PPP: 23 SEC (ref 22–30)
AST SERPL-CCNC: 378 UNITS/L
BASOPHILS # BLD: 0.1 K/MCL (ref 0–0.3)
BASOPHILS NFR BLD: 0 %
BILIRUB SERPL-MCNC: 0.9 MG/DL (ref 0.2–1)
BUN SERPL-MCNC: 90 MG/DL (ref 6–20)
BUN/CREAT SERPL: 58 (ref 7–25)
CALCIUM SERPL-MCNC: 7.8 MG/DL (ref 8.4–10.2)
CHLORIDE SERPL-SCNC: 110 MMOL/L (ref 98–107)
CO2 SERPL-SCNC: 31 MMOL/L (ref 21–32)
CREAT SERPL-MCNC: 1.54 MG/DL (ref 0.51–0.95)
DEPRECATED RDW RBC: 54.9 FL (ref 39–50)
EOSINOPHIL # BLD: 0 K/MCL (ref 0–0.5)
EOSINOPHIL NFR BLD: 0 %
ERYTHROCYTE [DISTWIDTH] IN BLOOD: 17.4 % (ref 11–15)
FASTING DURATION TIME PATIENT: ABNORMAL H
GFR SERPLBLD BASED ON 1.73 SQ M-ARVRAT: 29 ML/MIN/1.73M2
GLOBULIN SER-MCNC: 2.9 G/DL (ref 2–4)
GLUCOSE BLDC GLUCOMTR-MCNC: 126 MG/DL (ref 70–99)
GLUCOSE BLDC GLUCOMTR-MCNC: 133 MG/DL (ref 70–99)
GLUCOSE BLDC GLUCOMTR-MCNC: 134 MG/DL (ref 70–99)
GLUCOSE SERPL-MCNC: 124 MG/DL (ref 65–99)
HCT VFR BLD CALC: 24.8 % (ref 36–46.5)
HGB BLD-MCNC: 8.5 G/DL (ref 12–15.5)
IMM GRANULOCYTES # BLD AUTO: 0.7 K/MCL (ref 0–0.2)
IMM GRANULOCYTES # BLD: 3 %
LYMPHOCYTES # BLD: 0.6 K/MCL (ref 1–4)
LYMPHOCYTES NFR BLD: 2 %
MAGNESIUM SERPL-MCNC: 2.4 MG/DL (ref 1.7–2.4)
MCH RBC QN AUTO: 31.3 PG (ref 26–34)
MCHC RBC AUTO-ENTMCNC: 34.3 G/DL (ref 32–36.5)
MCV RBC AUTO: 91.2 FL (ref 78–100)
MONOCYTES # BLD: 0.8 K/MCL (ref 0.3–0.9)
MONOCYTES NFR BLD: 3 %
NEUTROPHILS # BLD: 24.4 K/MCL (ref 1.8–7.7)
NEUTROPHILS NFR BLD: 92 %
NRBC BLD MANUAL-RTO: 1 /100 WBC
PHOSPHATE SERPL-MCNC: 4.2 MG/DL (ref 2.4–4.7)
PLATELET # BLD AUTO: 61 K/MCL (ref 140–450)
POTASSIUM SERPL-SCNC: 3.8 MMOL/L (ref 3.4–5.1)
PROT SERPL-MCNC: 4.7 G/DL (ref 6.4–8.2)
RBC # BLD: 2.72 MIL/MCL (ref 4–5.2)
SODIUM SERPL-SCNC: 147 MMOL/L (ref 135–145)
WBC # BLD: 26.6 K/MCL (ref 4.2–11)

## 2021-06-24 PROCEDURE — 10002807 HB RX 258: Performed by: INTERNAL MEDICINE

## 2021-06-24 PROCEDURE — 85025 COMPLETE CBC W/AUTO DIFF WBC: CPT | Performed by: HOSPITALIST

## 2021-06-24 PROCEDURE — 10002807 HB RX 258: Performed by: NURSE PRACTITIONER

## 2021-06-24 PROCEDURE — 83735 ASSAY OF MAGNESIUM: CPT

## 2021-06-24 PROCEDURE — 84100 ASSAY OF PHOSPHORUS: CPT

## 2021-06-24 PROCEDURE — 80053 COMPREHEN METABOLIC PANEL: CPT | Performed by: HOSPITALIST

## 2021-06-24 PROCEDURE — 10002800 HB RX 250 W HCPCS: Performed by: HOSPITALIST

## 2021-06-24 PROCEDURE — 10002803 HB RX 637: Performed by: EMERGENCY MEDICINE

## 2021-06-24 PROCEDURE — 85730 THROMBOPLASTIN TIME PARTIAL: CPT | Performed by: INTERNAL MEDICINE

## 2021-06-24 PROCEDURE — 10002800 HB RX 250 W HCPCS: Performed by: NURSE PRACTITIONER

## 2021-06-24 PROCEDURE — 10003585 HB ROOM CHARGE INTERMEDIATE CARE

## 2021-06-24 PROCEDURE — 10002800 HB RX 250 W HCPCS: Performed by: INTERNAL MEDICINE

## 2021-06-24 PROCEDURE — C9113 INJ PANTOPRAZOLE SODIUM, VIA: HCPCS | Performed by: INTERNAL MEDICINE

## 2021-06-24 PROCEDURE — 13003289 HB OXYGEN THERAPY DAILY

## 2021-06-24 RX ADMIN — LYSINE, LEUCINE, PHENYLALANINE, VALINE, HISTIDINE, ISOLEUCINE, METHIONINE, THREONINE, TRYPTOPHAN, ALANINE, ARGININE, GLYCINE, PROLINE, GLUTAMIC ACID, SERINE, ASPARTIC ACID, TYROSINE
1.18; 1.04; 1.04; 960; 894; 749; 749; 749; 250; 2.17; 1.47; 1.04; 894; 749; 592; 434; 39 INJECTION, SOLUTION INTRAVENOUS at 22:31

## 2021-06-24 RX ADMIN — INSULIN GLARGINE 5 UNITS: 100 INJECTION, SOLUTION SUBCUTANEOUS at 18:21

## 2021-06-24 RX ADMIN — I.V. FAT EMULSION 250 ML: 20 EMULSION INTRAVENOUS at 22:43

## 2021-06-24 RX ADMIN — AMIODARONE HYDROCHLORIDE 0.5 MG/MIN: 50 INJECTION, SOLUTION INTRAVENOUS at 14:29

## 2021-06-24 RX ADMIN — MEROPENEM 1 G: 1 INJECTION INTRAVENOUS at 14:25

## 2021-06-24 RX ADMIN — PANTOPRAZOLE SODIUM 40 MG: 40 INJECTION, POWDER, FOR SOLUTION INTRAVENOUS at 10:17

## 2021-06-24 RX ADMIN — LIDOCAINE 1 PATCH: 246 PATCH TOPICAL at 10:17

## 2021-06-24 RX ADMIN — PANTOPRAZOLE SODIUM 40 MG: 40 INJECTION, POWDER, FOR SOLUTION INTRAVENOUS at 22:00

## 2021-06-24 ASSESSMENT — PAIN SCALES - GENERAL: PAINLEVEL_OUTOF10: 0

## 2021-06-25 ENCOUNTER — APPOINTMENT (OUTPATIENT)
Dept: CT IMAGING | Age: 86
DRG: 266 | End: 2021-06-25
Attending: HOSPITALIST

## 2021-06-25 LAB
ALBUMIN SERPL-MCNC: 1.6 G/DL (ref 3.6–5.1)
ALBUMIN/GLOB SERPL: 0.5 {RATIO} (ref 1–2.4)
ALP SERPL-CCNC: 95 UNITS/L (ref 45–117)
ALT SERPL-CCNC: 771 UNITS/L
AMMONIA PLAS-SCNC: <10 MCMOL/L
ANION GAP SERPL CALC-SCNC: 8 MMOL/L (ref 10–20)
APTT PPP: 22 SEC (ref 22–30)
AST SERPL-CCNC: 155 UNITS/L
BASE EXCESS / DEFICIT, ARTERIAL - RESPIRATORY: 9 MMOL/L (ref -2–3)
BASOPHILS # BLD: 0 K/MCL (ref 0–0.3)
BASOPHILS NFR BLD: 0 %
BDY SITE: ABNORMAL
BILIRUB SERPL-MCNC: 0.9 MG/DL (ref 0.2–1)
BODY TEMPERATURE: 36.7 DEGREES
BUN SERPL-MCNC: 78 MG/DL (ref 6–20)
BUN/CREAT SERPL: 60 (ref 7–25)
CALCIUM SERPL-MCNC: 8 MG/DL (ref 8.4–10.2)
CHLORIDE SERPL-SCNC: 113 MMOL/L (ref 98–107)
CO2 SERPL-SCNC: 30 MMOL/L (ref 21–32)
CONDITION: ABNORMAL
CREAT SERPL-MCNC: 1.3 MG/DL (ref 0.51–0.95)
DEPRECATED RDW RBC: 55.8 FL (ref 39–50)
EOSINOPHIL # BLD: 0 K/MCL (ref 0–0.5)
EOSINOPHIL NFR BLD: 0 %
ERYTHROCYTE [DISTWIDTH] IN BLOOD: 17.3 % (ref 11–15)
FASTING DURATION TIME PATIENT: ABNORMAL H
GFR SERPLBLD BASED ON 1.73 SQ M-ARVRAT: 36 ML/MIN/1.73M2
GLOBULIN SER-MCNC: 3.3 G/DL (ref 2–4)
GLUCOSE BLDC GLUCOMTR-MCNC: 136 MG/DL (ref 70–99)
GLUCOSE BLDC GLUCOMTR-MCNC: 149 MG/DL (ref 70–99)
GLUCOSE BLDC GLUCOMTR-MCNC: 150 MG/DL (ref 70–99)
GLUCOSE BLDC GLUCOMTR-MCNC: 156 MG/DL (ref 70–99)
GLUCOSE SERPL-MCNC: 143 MG/DL (ref 65–99)
HCO3 BLDA-SCNC: 31 MMOL/L (ref 22–28)
HCT VFR BLD CALC: 26 % (ref 36–46.5)
HCT VFR BLD CALC: 27 % (ref 36–46.5)
HGB BLD-MCNC: 8.3 G/DL (ref 12–15.5)
IMM GRANULOCYTES # BLD AUTO: 0.3 K/MCL (ref 0–0.2)
IMM GRANULOCYTES # BLD: 1 %
INR PPP: 1
LACTATE BLDV-SCNC: 0.7 MMOL/L (ref 0–2)
LYMPHOCYTES # BLD: 0.5 K/MCL (ref 1–4)
LYMPHOCYTES NFR BLD: 2 %
MAGNESIUM SERPL-MCNC: 2.3 MG/DL (ref 1.7–2.4)
MCH RBC QN AUTO: 29.9 PG (ref 26–34)
MCHC RBC AUTO-ENTMCNC: 31.9 G/DL (ref 32–36.5)
MCV RBC AUTO: 93.5 FL (ref 78–100)
MONOCYTES # BLD: 1 K/MCL (ref 0.3–0.9)
MONOCYTES NFR BLD: 5 %
NEUTROPHILS # BLD: 20 K/MCL (ref 1.8–7.7)
NEUTROPHILS NFR BLD: 92 %
NRBC BLD MANUAL-RTO: 0 /100 WBC
PCO2 BLDA: 33 MM HG (ref 32–45)
PH BLDA: 7.58 UNITS (ref 7.35–7.45)
PHOSPHATE SERPL-MCNC: 3 MG/DL (ref 2.4–4.7)
PLATELET # BLD AUTO: 60 K/MCL (ref 140–450)
PO2 BLDA: 67 MM HG (ref 83–108)
POTASSIUM SERPL-SCNC: 3.5 MMOL/L (ref 3.4–5.1)
PROT SERPL-MCNC: 4.9 G/DL (ref 6.4–8.2)
PROTHROMBIN TIME: 10.7 SEC (ref 9.7–11.8)
RBC # BLD: 2.78 MIL/MCL (ref 4–5.2)
SODIUM SERPL-SCNC: 147 MMOL/L (ref 135–145)
WBC # BLD: 21.9 K/MCL (ref 4.2–11)

## 2021-06-25 PROCEDURE — 10002800 HB RX 250 W HCPCS: Performed by: HOSPITALIST

## 2021-06-25 PROCEDURE — C9113 INJ PANTOPRAZOLE SODIUM, VIA: HCPCS | Performed by: INTERNAL MEDICINE

## 2021-06-25 PROCEDURE — G1004 CDSM NDSC: HCPCS

## 2021-06-25 PROCEDURE — 10002800 HB RX 250 W HCPCS: Performed by: INTERNAL MEDICINE

## 2021-06-25 PROCEDURE — 10002807 HB RX 258: Performed by: NURSE PRACTITIONER

## 2021-06-25 PROCEDURE — 10002800 HB RX 250 W HCPCS: Performed by: NURSE PRACTITIONER

## 2021-06-25 PROCEDURE — 10004281 HB COUNTER-STAFF TIME PER 15 MIN

## 2021-06-25 PROCEDURE — 10002807 HB RX 258: Performed by: INTERNAL MEDICINE

## 2021-06-25 PROCEDURE — 36600 WITHDRAWAL OF ARTERIAL BLOOD: CPT

## 2021-06-25 PROCEDURE — 83605 ASSAY OF LACTIC ACID: CPT | Performed by: HOSPITALIST

## 2021-06-25 PROCEDURE — 10002803 HB RX 637: Performed by: EMERGENCY MEDICINE

## 2021-06-25 PROCEDURE — 10003585 HB ROOM CHARGE INTERMEDIATE CARE

## 2021-06-25 PROCEDURE — 82140 ASSAY OF AMMONIA: CPT | Performed by: HOSPITALIST

## 2021-06-25 PROCEDURE — 85610 PROTHROMBIN TIME: CPT | Performed by: HOSPITALIST

## 2021-06-25 PROCEDURE — 85730 THROMBOPLASTIN TIME PARTIAL: CPT | Performed by: HOSPITALIST

## 2021-06-25 PROCEDURE — 84100 ASSAY OF PHOSPHORUS: CPT | Performed by: INTERNAL MEDICINE

## 2021-06-25 PROCEDURE — 85025 COMPLETE CBC W/AUTO DIFF WBC: CPT | Performed by: HOSPITALIST

## 2021-06-25 PROCEDURE — 83735 ASSAY OF MAGNESIUM: CPT | Performed by: INTERNAL MEDICINE

## 2021-06-25 PROCEDURE — 70450 CT HEAD/BRAIN W/O DYE: CPT

## 2021-06-25 PROCEDURE — 13003289 HB OXYGEN THERAPY DAILY

## 2021-06-25 PROCEDURE — 82803 BLOOD GASES ANY COMBINATION: CPT

## 2021-06-25 PROCEDURE — 80053 COMPREHEN METABOLIC PANEL: CPT | Performed by: HOSPITALIST

## 2021-06-25 PROCEDURE — 36415 COLL VENOUS BLD VENIPUNCTURE: CPT | Performed by: HOSPITALIST

## 2021-06-25 PROCEDURE — P9047 ALBUMIN (HUMAN), 25%, 50ML: HCPCS | Performed by: HOSPITALIST

## 2021-06-25 RX ORDER — FUROSEMIDE 10 MG/ML
20 INJECTION INTRAMUSCULAR; INTRAVENOUS ONCE
Status: COMPLETED | OUTPATIENT
Start: 2021-06-25 | End: 2021-06-25

## 2021-06-25 RX ORDER — HEPARIN SODIUM 1000 [USP'U]/ML
30 INJECTION, SOLUTION INTRAVENOUS; SUBCUTANEOUS PRN
Status: DISCONTINUED | OUTPATIENT
Start: 2021-06-25 | End: 2021-07-07

## 2021-06-25 RX ORDER — ALBUMIN (HUMAN) 12.5 G/50ML
25 SOLUTION INTRAVENOUS ONCE
Status: COMPLETED | OUTPATIENT
Start: 2021-06-25 | End: 2021-06-30

## 2021-06-25 RX ORDER — HEPARIN SODIUM 1000 [USP'U]/ML
60 INJECTION, SOLUTION INTRAVENOUS; SUBCUTANEOUS ONCE
Status: DISCONTINUED | OUTPATIENT
Start: 2021-06-25 | End: 2021-06-25

## 2021-06-25 RX ORDER — HEPARIN SODIUM 1000 [USP'U]/ML
60 INJECTION, SOLUTION INTRAVENOUS; SUBCUTANEOUS PRN
Status: DISCONTINUED | OUTPATIENT
Start: 2021-06-25 | End: 2021-07-07

## 2021-06-25 RX ORDER — HEPARIN SODIUM 10000 [USP'U]/100ML
0-30 INJECTION, SOLUTION INTRAVENOUS CONTINUOUS
Status: DISCONTINUED | OUTPATIENT
Start: 2021-06-25 | End: 2021-07-07

## 2021-06-25 RX ADMIN — PANTOPRAZOLE SODIUM 40 MG: 40 INJECTION, POWDER, FOR SOLUTION INTRAVENOUS at 08:34

## 2021-06-25 RX ADMIN — LYSINE, LEUCINE, PHENYLALANINE, VALINE, HISTIDINE, ISOLEUCINE, METHIONINE, THREONINE, TRYPTOPHAN, ALANINE, ARGININE, GLYCINE, PROLINE, GLUTAMIC ACID, SERINE, ASPARTIC ACID, TYROSINE
1.18; 1.04; 1.04; 960; 894; 749; 749; 749; 250; 2.17; 1.47; 1.04; 894; 749; 592; 434; 39 INJECTION, SOLUTION INTRAVENOUS at 22:19

## 2021-06-25 RX ADMIN — INSULIN HUMAN 2 UNITS: 100 INJECTION, SOLUTION PARENTERAL at 18:51

## 2021-06-25 RX ADMIN — ALBUMIN HUMAN 25 G: 0.25 SOLUTION INTRAVENOUS at 18:45

## 2021-06-25 RX ADMIN — INSULIN GLARGINE 5 UNITS: 100 INJECTION, SOLUTION SUBCUTANEOUS at 18:45

## 2021-06-25 RX ADMIN — MORPHINE SULFATE 2 MG: 2 INJECTION, SOLUTION INTRAMUSCULAR; INTRAVENOUS at 02:29

## 2021-06-25 RX ADMIN — MEROPENEM 1 G: 1 INJECTION INTRAVENOUS at 23:00

## 2021-06-25 RX ADMIN — AMIODARONE HYDROCHLORIDE 0.5 MG/MIN: 50 INJECTION, SOLUTION INTRAVENOUS at 07:37

## 2021-06-25 RX ADMIN — MEROPENEM 1 G: 1 INJECTION INTRAVENOUS at 13:20

## 2021-06-25 RX ADMIN — PANTOPRAZOLE SODIUM 40 MG: 40 INJECTION, POWDER, FOR SOLUTION INTRAVENOUS at 22:14

## 2021-06-25 RX ADMIN — LIDOCAINE 1 PATCH: 246 PATCH TOPICAL at 08:34

## 2021-06-25 RX ADMIN — AMIODARONE HYDROCHLORIDE 0.5 MG/MIN: 50 INJECTION, SOLUTION INTRAVENOUS at 22:36

## 2021-06-25 RX ADMIN — INSULIN HUMAN 2 UNITS: 100 INJECTION, SOLUTION PARENTERAL at 13:32

## 2021-06-25 RX ADMIN — MEROPENEM 1 G: 1 INJECTION INTRAVENOUS at 00:25

## 2021-06-25 RX ADMIN — FUROSEMIDE 20 MG: 10 INJECTION, SOLUTION INTRAMUSCULAR; INTRAVENOUS at 22:14

## 2021-06-25 RX ADMIN — I.V. FAT EMULSION 250 ML: 20 EMULSION INTRAVENOUS at 22:21

## 2021-06-26 LAB
ALBUMIN SERPL-MCNC: 2.2 G/DL (ref 3.6–5.1)
ALBUMIN/GLOB SERPL: 0.6 {RATIO} (ref 1–2.4)
ALP SERPL-CCNC: 94 UNITS/L (ref 45–117)
ALT SERPL-CCNC: 420 UNITS/L
ANION GAP SERPL CALC-SCNC: 8 MMOL/L (ref 10–20)
AST SERPL-CCNC: 62 UNITS/L
BASOPHILS # BLD: 0 K/MCL (ref 0–0.3)
BASOPHILS NFR BLD: 0 %
BILIRUB SERPL-MCNC: 1.3 MG/DL (ref 0.2–1)
BUN SERPL-MCNC: 67 MG/DL (ref 6–20)
BUN/CREAT SERPL: 61 (ref 7–25)
CALCIUM SERPL-MCNC: 8.5 MG/DL (ref 8.4–10.2)
CHLORIDE SERPL-SCNC: 110 MMOL/L (ref 98–107)
CO2 SERPL-SCNC: 32 MMOL/L (ref 21–32)
CREAT SERPL-MCNC: 1.09 MG/DL (ref 0.51–0.95)
DEPRECATED RDW RBC: 58.3 FL (ref 39–50)
EOSINOPHIL # BLD: 0.1 K/MCL (ref 0–0.5)
EOSINOPHIL NFR BLD: 0 %
ERYTHROCYTE [DISTWIDTH] IN BLOOD: 17.2 % (ref 11–15)
FASTING DURATION TIME PATIENT: ABNORMAL H
GFR SERPLBLD BASED ON 1.73 SQ M-ARVRAT: 44 ML/MIN/1.73M2
GLOBULIN SER-MCNC: 3.5 G/DL (ref 2–4)
GLUCOSE BLDC GLUCOMTR-MCNC: 127 MG/DL (ref 70–99)
GLUCOSE BLDC GLUCOMTR-MCNC: 128 MG/DL (ref 70–99)
GLUCOSE BLDC GLUCOMTR-MCNC: 131 MG/DL (ref 70–99)
GLUCOSE BLDC GLUCOMTR-MCNC: 139 MG/DL (ref 70–99)
GLUCOSE BLDC GLUCOMTR-MCNC: 149 MG/DL (ref 70–99)
GLUCOSE SERPL-MCNC: 146 MG/DL (ref 65–99)
HCT VFR BLD CALC: 26.7 % (ref 36–46.5)
HGB BLD-MCNC: 8.4 G/DL (ref 12–15.5)
IMM GRANULOCYTES # BLD AUTO: 0.4 K/MCL (ref 0–0.2)
IMM GRANULOCYTES # BLD: 2 %
LYMPHOCYTES # BLD: 0.5 K/MCL (ref 1–4)
LYMPHOCYTES NFR BLD: 2 %
MAGNESIUM SERPL-MCNC: 2.3 MG/DL (ref 1.7–2.4)
MCH RBC QN AUTO: 30 PG (ref 26–34)
MCHC RBC AUTO-ENTMCNC: 31.5 G/DL (ref 32–36.5)
MCV RBC AUTO: 95.4 FL (ref 78–100)
MONOCYTES # BLD: 1 K/MCL (ref 0.3–0.9)
MONOCYTES NFR BLD: 4 %
NEUTROPHILS # BLD: 21.1 K/MCL (ref 1.8–7.7)
NEUTROPHILS NFR BLD: 92 %
NRBC BLD MANUAL-RTO: 0 /100 WBC
PHOSPHATE SERPL-MCNC: 2.4 MG/DL (ref 2.4–4.7)
PLATELET # BLD AUTO: 66 K/MCL (ref 140–450)
POTASSIUM SERPL-SCNC: 3.3 MMOL/L (ref 3.4–5.1)
PROT SERPL-MCNC: 5.7 G/DL (ref 6.4–8.2)
RBC # BLD: 2.8 MIL/MCL (ref 4–5.2)
SODIUM SERPL-SCNC: 147 MMOL/L (ref 135–145)
WBC # BLD: 23.1 K/MCL (ref 4.2–11)

## 2021-06-26 PROCEDURE — 80053 COMPREHEN METABOLIC PANEL: CPT | Performed by: INTERNAL MEDICINE

## 2021-06-26 PROCEDURE — 10002801 HB RX 250 W/O HCPCS: Performed by: INTERNAL MEDICINE

## 2021-06-26 PROCEDURE — 13003289 HB OXYGEN THERAPY DAILY

## 2021-06-26 PROCEDURE — 84100 ASSAY OF PHOSPHORUS: CPT | Performed by: INTERNAL MEDICINE

## 2021-06-26 PROCEDURE — 10002800 HB RX 250 W HCPCS: Performed by: HOSPITALIST

## 2021-06-26 PROCEDURE — 10003585 HB ROOM CHARGE INTERMEDIATE CARE

## 2021-06-26 PROCEDURE — 10002807 HB RX 258: Performed by: INTERNAL MEDICINE

## 2021-06-26 PROCEDURE — 83735 ASSAY OF MAGNESIUM: CPT | Performed by: INTERNAL MEDICINE

## 2021-06-26 PROCEDURE — 10002800 HB RX 250 W HCPCS: Performed by: INTERNAL MEDICINE

## 2021-06-26 PROCEDURE — 94640 AIRWAY INHALATION TREATMENT: CPT

## 2021-06-26 PROCEDURE — C9113 INJ PANTOPRAZOLE SODIUM, VIA: HCPCS | Performed by: INTERNAL MEDICINE

## 2021-06-26 PROCEDURE — 10002803 HB RX 637: Performed by: EMERGENCY MEDICINE

## 2021-06-26 PROCEDURE — 10002800 HB RX 250 W HCPCS: Performed by: NURSE PRACTITIONER

## 2021-06-26 PROCEDURE — 85025 COMPLETE CBC W/AUTO DIFF WBC: CPT | Performed by: INTERNAL MEDICINE

## 2021-06-26 PROCEDURE — 10002807 HB RX 258: Performed by: NURSE PRACTITIONER

## 2021-06-26 RX ORDER — ACETAMINOPHEN 10 MG/ML
1000 INJECTION, SOLUTION INTRAVENOUS ONCE
Status: COMPLETED | OUTPATIENT
Start: 2021-06-26 | End: 2021-06-30

## 2021-06-26 RX ADMIN — PANTOPRAZOLE SODIUM 40 MG: 40 INJECTION, POWDER, FOR SOLUTION INTRAVENOUS at 22:35

## 2021-06-26 RX ADMIN — MEROPENEM 1 G: 1 INJECTION INTRAVENOUS at 12:50

## 2021-06-26 RX ADMIN — INSULIN GLARGINE 5 UNITS: 100 INJECTION, SOLUTION SUBCUTANEOUS at 18:47

## 2021-06-26 RX ADMIN — I.V. FAT EMULSION 250 ML: 20 EMULSION INTRAVENOUS at 22:48

## 2021-06-26 RX ADMIN — MEROPENEM 1 G: 1 INJECTION INTRAVENOUS at 23:59

## 2021-06-26 RX ADMIN — PANTOPRAZOLE SODIUM 40 MG: 40 INJECTION, POWDER, FOR SOLUTION INTRAVENOUS at 08:31

## 2021-06-26 RX ADMIN — POTASSIUM PHOSPHATE, MONOBASIC AND POTASSIUM PHOSPHATE, DIBASIC 45 MMOL: 224; 236 INJECTION, SOLUTION, CONCENTRATE INTRAVENOUS at 08:27

## 2021-06-26 RX ADMIN — ACETAMINOPHEN 1000 MG: 10 INJECTION, SOLUTION INTRAVENOUS at 05:05

## 2021-06-26 RX ADMIN — AMIODARONE HYDROCHLORIDE 0.5 MG/MIN: 50 INJECTION, SOLUTION INTRAVENOUS at 14:10

## 2021-06-26 RX ADMIN — LIDOCAINE 1 PATCH: 246 PATCH TOPICAL at 08:30

## 2021-06-26 RX ADMIN — ALBUTEROL SULFATE 2.5 MG: 2.5 SOLUTION RESPIRATORY (INHALATION) at 04:39

## 2021-06-26 RX ADMIN — LYSINE, LEUCINE, PHENYLALANINE, VALINE, HISTIDINE, ISOLEUCINE, METHIONINE, THREONINE, TRYPTOPHAN, ALANINE, ARGININE, GLYCINE, PROLINE, GLUTAMIC ACID, SERINE, ASPARTIC ACID, TYROSINE
1.18; 1.04; 1.04; 960; 894; 749; 749; 749; 250; 2.17; 1.47; 1.04; 894; 749; 592; 434; 39 INJECTION, SOLUTION INTRAVENOUS at 22:45

## 2021-06-26 ASSESSMENT — MOVEMENT AND STRENGTH ASSESSMENTS
LUE_ASSESSMENT: FAIR/COMPLETE ROM AGAINST GRAVITY, NO ADDED RESISTANCE
RUE_ASSESSMENT: FAIR/COMPLETE ROM AGAINST GRAVITY, NO ADDED RESISTANCE
LLE_ASSESSMENT: FAIR/COMPLETE ROM AGAINST GRAVITY, NO ADDED RESISTANCE

## 2021-06-26 ASSESSMENT — PAIN SCALES - WONG BAKER
WONGBAKER_NUMERICALRESPONSE: 0
WONGBAKER_NUMERICALRESPONSE: 0

## 2021-06-26 ASSESSMENT — PAIN SCALES - GENERAL
PAINLEVEL_OUTOF10: 0
PAINLEVEL_OUTOF10: 0

## 2021-06-27 LAB
ALBUMIN SERPL-MCNC: 1.8 G/DL (ref 3.6–5.1)
ALBUMIN/GLOB SERPL: 0.5 {RATIO} (ref 1–2.4)
ALP SERPL-CCNC: 103 UNITS/L (ref 45–117)
ALT SERPL-CCNC: 254 UNITS/L
ANION GAP SERPL CALC-SCNC: 9 MMOL/L (ref 10–20)
APTT PPP: 21 SEC (ref 22–30)
AST SERPL-CCNC: 51 UNITS/L
BILIRUB SERPL-MCNC: 1.2 MG/DL (ref 0.2–1)
BUN SERPL-MCNC: 65 MG/DL (ref 6–20)
BUN/CREAT SERPL: 60 (ref 7–25)
CALCIUM SERPL-MCNC: 8.5 MG/DL (ref 8.4–10.2)
CHLORIDE SERPL-SCNC: 110 MMOL/L (ref 98–107)
CO2 SERPL-SCNC: 33 MMOL/L (ref 21–32)
CREAT SERPL-MCNC: 1.08 MG/DL (ref 0.51–0.95)
DEPRECATED RDW RBC: 56.7 FL (ref 39–50)
ERYTHROCYTE [DISTWIDTH] IN BLOOD: 16.9 % (ref 11–15)
FASTING DURATION TIME PATIENT: ABNORMAL H
FASTING DURATION TIME PATIENT: NORMAL H
GFR SERPLBLD BASED ON 1.73 SQ M-ARVRAT: 45 ML/MIN/1.73M2
GLOBULIN SER-MCNC: 3.5 G/DL (ref 2–4)
GLUCOSE BLDC GLUCOMTR-MCNC: 119 MG/DL (ref 70–99)
GLUCOSE BLDC GLUCOMTR-MCNC: 123 MG/DL (ref 70–99)
GLUCOSE BLDC GLUCOMTR-MCNC: 131 MG/DL (ref 70–99)
GLUCOSE BLDC GLUCOMTR-MCNC: 148 MG/DL (ref 70–99)
GLUCOSE SERPL-MCNC: 120 MG/DL (ref 65–99)
HCT VFR BLD CALC: 25.1 % (ref 36–46.5)
HGB BLD-MCNC: 7.8 G/DL (ref 12–15.5)
MAGNESIUM SERPL-MCNC: 2.2 MG/DL (ref 1.7–2.4)
MCH RBC QN AUTO: 29.3 PG (ref 26–34)
MCHC RBC AUTO-ENTMCNC: 31.1 G/DL (ref 32–36.5)
MCV RBC AUTO: 94.4 FL (ref 78–100)
NRBC BLD MANUAL-RTO: 0 /100 WBC
PHOSPHATE SERPL-MCNC: 3.3 MG/DL (ref 2.4–4.7)
PLATELET # BLD AUTO: 64 K/MCL (ref 140–450)
POTASSIUM SERPL-SCNC: 3.8 MMOL/L (ref 3.4–5.1)
PROCALCITONIN SERPL IA-MCNC: 0.4 NG/ML
PROT SERPL-MCNC: 5.3 G/DL (ref 6.4–8.2)
RBC # BLD: 2.66 MIL/MCL (ref 4–5.2)
SODIUM SERPL-SCNC: 148 MMOL/L (ref 135–145)
TRIGL SERPL-MCNC: 79 MG/DL
WBC # BLD: 19.5 K/MCL (ref 4.2–11)

## 2021-06-27 PROCEDURE — 10002803 HB RX 637: Performed by: EMERGENCY MEDICINE

## 2021-06-27 PROCEDURE — 85027 COMPLETE CBC AUTOMATED: CPT | Performed by: HOSPITALIST

## 2021-06-27 PROCEDURE — 10002800 HB RX 250 W HCPCS: Performed by: NURSE PRACTITIONER

## 2021-06-27 PROCEDURE — 84145 PROCALCITONIN (PCT): CPT | Performed by: INTERNAL MEDICINE

## 2021-06-27 PROCEDURE — 13003289 HB OXYGEN THERAPY DAILY

## 2021-06-27 PROCEDURE — 10003585 HB ROOM CHARGE INTERMEDIATE CARE

## 2021-06-27 PROCEDURE — 10002807 HB RX 258: Performed by: INTERNAL MEDICINE

## 2021-06-27 PROCEDURE — 84100 ASSAY OF PHOSPHORUS: CPT | Performed by: INTERNAL MEDICINE

## 2021-06-27 PROCEDURE — 10002803 HB RX 637: Performed by: INTERNAL MEDICINE

## 2021-06-27 PROCEDURE — 10002800 HB RX 250 W HCPCS: Performed by: INTERNAL MEDICINE

## 2021-06-27 PROCEDURE — 10002807 HB RX 258: Performed by: NURSE PRACTITIONER

## 2021-06-27 PROCEDURE — 80053 COMPREHEN METABOLIC PANEL: CPT

## 2021-06-27 PROCEDURE — 85730 THROMBOPLASTIN TIME PARTIAL: CPT | Performed by: INTERNAL MEDICINE

## 2021-06-27 PROCEDURE — 83735 ASSAY OF MAGNESIUM: CPT | Performed by: INTERNAL MEDICINE

## 2021-06-27 PROCEDURE — C9113 INJ PANTOPRAZOLE SODIUM, VIA: HCPCS | Performed by: INTERNAL MEDICINE

## 2021-06-27 PROCEDURE — 10002800 HB RX 250 W HCPCS: Performed by: HOSPITALIST

## 2021-06-27 PROCEDURE — 10002807 HB RX 258: Performed by: HOSPITALIST

## 2021-06-27 PROCEDURE — 84478 ASSAY OF TRIGLYCERIDES: CPT

## 2021-06-27 RX ORDER — ACETAMINOPHEN 650 MG/1
650 SUPPOSITORY RECTAL EVERY 6 HOURS PRN
Status: DISCONTINUED | OUTPATIENT
Start: 2021-06-27 | End: 2021-07-08 | Stop reason: HOSPADM

## 2021-06-27 RX ADMIN — AMIODARONE HYDROCHLORIDE 0.5 MG/MIN: 50 INJECTION, SOLUTION INTRAVENOUS at 22:25

## 2021-06-27 RX ADMIN — PANTOPRAZOLE SODIUM 40 MG: 40 INJECTION, POWDER, FOR SOLUTION INTRAVENOUS at 09:55

## 2021-06-27 RX ADMIN — MEROPENEM 1 G: 1 INJECTION INTRAVENOUS at 12:30

## 2021-06-27 RX ADMIN — LYSINE, LEUCINE, PHENYLALANINE, VALINE, HISTIDINE, ISOLEUCINE, METHIONINE, THREONINE, TRYPTOPHAN, ALANINE, ARGININE, GLYCINE, PROLINE, GLUTAMIC ACID, SERINE, ASPARTIC ACID, TYROSINE
1.18; 1.04; 1.04; 960; 894; 749; 749; 749; 250; 2.17; 1.47; 1.04; 894; 749; 592; 434; 39 INJECTION, SOLUTION INTRAVENOUS at 21:52

## 2021-06-27 RX ADMIN — DEXTROSE MONOHYDRATE 250 ML: 50 INJECTION, SOLUTION INTRAVENOUS at 15:33

## 2021-06-27 RX ADMIN — AMIODARONE HYDROCHLORIDE 0.5 MG/MIN: 50 INJECTION, SOLUTION INTRAVENOUS at 06:04

## 2021-06-27 RX ADMIN — INSULIN GLARGINE 5 UNITS: 100 INJECTION, SOLUTION SUBCUTANEOUS at 18:19

## 2021-06-27 RX ADMIN — ACETAMINOPHEN 650 MG: 650 SUPPOSITORY RECTAL at 01:44

## 2021-06-27 RX ADMIN — LIDOCAINE 1 PATCH: 246 PATCH TOPICAL at 09:55

## 2021-06-27 RX ADMIN — PANTOPRAZOLE SODIUM 40 MG: 40 INJECTION, POWDER, FOR SOLUTION INTRAVENOUS at 21:03

## 2021-06-27 RX ADMIN — I.V. FAT EMULSION 250 ML: 20 EMULSION INTRAVENOUS at 21:54

## 2021-06-27 ASSESSMENT — PAIN SCALES - GENERAL
PAINLEVEL_OUTOF10: 0

## 2021-06-28 LAB
ABO + RH BLD: NORMAL
ALBUMIN SERPL-MCNC: 1.5 G/DL (ref 3.6–5.1)
ALBUMIN/GLOB SERPL: 0.4 {RATIO} (ref 1–2.4)
ALP SERPL-CCNC: 110 UNITS/L (ref 45–117)
ALT SERPL-CCNC: 174 UNITS/L
ANION GAP SERPL CALC-SCNC: 6 MMOL/L (ref 10–20)
APTT PPP: 27 SEC (ref 22–30)
AST SERPL-CCNC: 47 UNITS/L
BILIRUB SERPL-MCNC: 1.1 MG/DL (ref 0.2–1)
BLD GP AB SCN SERPL QL GEL: NEGATIVE
BUN SERPL-MCNC: 56 MG/DL (ref 6–20)
BUN/CREAT SERPL: 57 (ref 7–25)
CALCIUM SERPL-MCNC: 8.2 MG/DL (ref 8.4–10.2)
CHLORIDE SERPL-SCNC: 105 MMOL/L (ref 98–107)
CO2 SERPL-SCNC: 34 MMOL/L (ref 21–32)
CREAT SERPL-MCNC: 0.98 MG/DL (ref 0.51–0.95)
DEPRECATED RDW RBC: 56.6 FL (ref 39–50)
ERYTHROCYTE [DISTWIDTH] IN BLOOD: 16.4 % (ref 11–15)
FASTING DURATION TIME PATIENT: ABNORMAL H
FASTING DURATION TIME PATIENT: NORMAL H
GFR SERPLBLD BASED ON 1.73 SQ M-ARVRAT: 51 ML/MIN/1.73M2
GLOBULIN SER-MCNC: 3.6 G/DL (ref 2–4)
GLUCOSE BLDC GLUCOMTR-MCNC: 107 MG/DL (ref 70–99)
GLUCOSE BLDC GLUCOMTR-MCNC: 119 MG/DL (ref 70–99)
GLUCOSE BLDC GLUCOMTR-MCNC: 130 MG/DL (ref 70–99)
GLUCOSE BLDC GLUCOMTR-MCNC: 131 MG/DL (ref 70–99)
GLUCOSE SERPL-MCNC: 210 MG/DL (ref 65–99)
HCT VFR BLD CALC: 22.7 % (ref 36–46.5)
HGB BLD-MCNC: 7.1 G/DL (ref 12–15.5)
MAGNESIUM SERPL-MCNC: 2 MG/DL (ref 1.7–2.4)
MCH RBC QN AUTO: 30.1 PG (ref 26–34)
MCHC RBC AUTO-ENTMCNC: 31.3 G/DL (ref 32–36.5)
MCV RBC AUTO: 96.2 FL (ref 78–100)
NRBC BLD MANUAL-RTO: 0 /100 WBC
PHOSPHATE SERPL-MCNC: 2.3 MG/DL (ref 2.4–4.7)
PLATELET # BLD AUTO: 67 K/MCL (ref 140–450)
POTASSIUM SERPL-SCNC: 3.9 MMOL/L (ref 3.4–5.1)
PROT SERPL-MCNC: 5.1 G/DL (ref 6.4–8.2)
RBC # BLD: 2.36 MIL/MCL (ref 4–5.2)
SODIUM SERPL-SCNC: 141 MMOL/L (ref 135–145)
TRIGL SERPL-MCNC: 97 MG/DL
TYPE AND SCREEN EXPIRATION DATE: NORMAL
WBC # BLD: 16.9 K/MCL (ref 4.2–11)

## 2021-06-28 PROCEDURE — 13003289 HB OXYGEN THERAPY DAILY

## 2021-06-28 PROCEDURE — 85027 COMPLETE CBC AUTOMATED: CPT | Performed by: HOSPITALIST

## 2021-06-28 PROCEDURE — 84100 ASSAY OF PHOSPHORUS: CPT

## 2021-06-28 PROCEDURE — 10002807 HB RX 258: Performed by: INTERNAL MEDICINE

## 2021-06-28 PROCEDURE — C9113 INJ PANTOPRAZOLE SODIUM, VIA: HCPCS | Performed by: INTERNAL MEDICINE

## 2021-06-28 PROCEDURE — 10002807 HB RX 258: Performed by: NURSE PRACTITIONER

## 2021-06-28 PROCEDURE — 84478 ASSAY OF TRIGLYCERIDES: CPT | Performed by: INTERNAL MEDICINE

## 2021-06-28 PROCEDURE — 80053 COMPREHEN METABOLIC PANEL: CPT | Performed by: HOSPITALIST

## 2021-06-28 PROCEDURE — 10003585 HB ROOM CHARGE INTERMEDIATE CARE

## 2021-06-28 PROCEDURE — 10002800 HB RX 250 W HCPCS: Performed by: NURSE PRACTITIONER

## 2021-06-28 PROCEDURE — 10002800 HB RX 250 W HCPCS: Performed by: INTERNAL MEDICINE

## 2021-06-28 PROCEDURE — 86901 BLOOD TYPING SEROLOGIC RH(D): CPT | Performed by: INTERNAL MEDICINE

## 2021-06-28 PROCEDURE — 10002800 HB RX 250 W HCPCS: Performed by: HOSPITALIST

## 2021-06-28 PROCEDURE — 10002803 HB RX 637: Performed by: EMERGENCY MEDICINE

## 2021-06-28 PROCEDURE — 85730 THROMBOPLASTIN TIME PARTIAL: CPT | Performed by: INTERNAL MEDICINE

## 2021-06-28 PROCEDURE — 83735 ASSAY OF MAGNESIUM: CPT

## 2021-06-28 PROCEDURE — 97110 THERAPEUTIC EXERCISES: CPT

## 2021-06-28 RX ADMIN — MEROPENEM 1 G: 1 INJECTION INTRAVENOUS at 23:56

## 2021-06-28 RX ADMIN — PANTOPRAZOLE SODIUM 40 MG: 40 INJECTION, POWDER, FOR SOLUTION INTRAVENOUS at 22:15

## 2021-06-28 RX ADMIN — INSULIN GLARGINE 5 UNITS: 100 INJECTION, SOLUTION SUBCUTANEOUS at 17:32

## 2021-06-28 RX ADMIN — AMIODARONE HYDROCHLORIDE 0.5 MG/MIN: 50 INJECTION, SOLUTION INTRAVENOUS at 15:49

## 2021-06-28 RX ADMIN — MEROPENEM 1 G: 1 INJECTION INTRAVENOUS at 00:27

## 2021-06-28 RX ADMIN — SOYBEAN OIL 250 ML: 20 INJECTION, SOLUTION INTRAVENOUS at 22:21

## 2021-06-28 RX ADMIN — PANTOPRAZOLE SODIUM 40 MG: 40 INJECTION, POWDER, FOR SOLUTION INTRAVENOUS at 08:47

## 2021-06-28 RX ADMIN — MEROPENEM 1 G: 1 INJECTION INTRAVENOUS at 12:53

## 2021-06-28 RX ADMIN — LYSINE, LEUCINE, PHENYLALANINE, VALINE, HISTIDINE, ISOLEUCINE, METHIONINE, THREONINE, TRYPTOPHAN, ALANINE, ARGININE, GLYCINE, PROLINE, GLUTAMIC ACID, SERINE, ASPARTIC ACID, TYROSINE
1.18; 1.04; 1.04; 960; 894; 749; 749; 749; 250; 2.17; 1.47; 1.04; 894; 749; 592; 434; 39 INJECTION, SOLUTION INTRAVENOUS at 22:23

## 2021-06-28 RX ADMIN — LIDOCAINE 1 PATCH: 246 PATCH TOPICAL at 08:48

## 2021-06-28 ASSESSMENT — COGNITIVE AND FUNCTIONAL STATUS - GENERAL
DAILY_ACTIVITY_CONVERTED_SCORE: 17.06
APPLIED_COGNITIVE_RAW_SCORE: 7
TAKING CARE OF COMPLICATED TASKS: UNABLE
FOLLOWS FAMILIAR CONVERSATION: UNABLE
UNDERSTANDING 10 TO 15 MIN SPEECH: A LOT
HELP NEEDED DRESSING REGULAR LOWER BODY CLOTHING: TOTAL
REMEMBERING 5 ERRANDS WITH NO LIST: UNABLE
REMEMBERING WHERE THINGS ARE: UNABLE
HELP NEEDED FOR PERSONAL GROOMING: TOTAL
DAILY_ACTIVITY_RAW_SCORE: 6
HELP NEEDED FOR TOILETING: TOTAL
HELP NEEDED DRESSING REGULAR UPPER BODY CLOTHING: TOTAL
APPLIED_COGNITIVE_CONVERTED_SCORE: 15.17
REMEMBERING TO TAKE MEDICATION: UNABLE
HELP NEEDED FOR BATHING: TOTAL

## 2021-06-28 ASSESSMENT — PAIN SCALES - GENERAL: PAINLEVEL_OUTOF10: 0

## 2021-06-29 ENCOUNTER — APPOINTMENT (OUTPATIENT)
Dept: GENERAL RADIOLOGY | Age: 86
DRG: 266 | End: 2021-06-29
Attending: INTERNAL MEDICINE

## 2021-06-29 ENCOUNTER — ANESTHESIA (OUTPATIENT)
Dept: GASTROENTEROLOGY | Age: 86
DRG: 266 | End: 2021-06-29

## 2021-06-29 ENCOUNTER — ANESTHESIA EVENT (OUTPATIENT)
Dept: GASTROENTEROLOGY | Age: 86
DRG: 266 | End: 2021-06-29

## 2021-06-29 ENCOUNTER — APPOINTMENT (OUTPATIENT)
Dept: GASTROENTEROLOGY | Age: 86
DRG: 266 | End: 2021-06-29
Attending: INTERNAL MEDICINE

## 2021-06-29 LAB
ALBUMIN SERPL-MCNC: 1.4 G/DL (ref 3.6–5.1)
ALBUMIN/GLOB SERPL: 0.4 {RATIO} (ref 1–2.4)
ALP SERPL-CCNC: 128 UNITS/L (ref 45–117)
ALT SERPL-CCNC: 148 UNITS/L
ANION GAP SERPL CALC-SCNC: 9 MMOL/L (ref 10–20)
AST SERPL-CCNC: 62 UNITS/L
BILIRUB SERPL-MCNC: 1 MG/DL (ref 0.2–1)
BUN SERPL-MCNC: 57 MG/DL (ref 6–20)
BUN/CREAT SERPL: 59 (ref 7–25)
CALCIUM SERPL-MCNC: 8.1 MG/DL (ref 8.4–10.2)
CHLORIDE SERPL-SCNC: 106 MMOL/L (ref 98–107)
CO2 SERPL-SCNC: 31 MMOL/L (ref 21–32)
CREAT SERPL-MCNC: 0.97 MG/DL (ref 0.51–0.95)
DEPRECATED RDW RBC: 55.8 FL (ref 39–50)
ERYTHROCYTE [DISTWIDTH] IN BLOOD: 16.3 % (ref 11–15)
FASTING DURATION TIME PATIENT: ABNORMAL H
FASTING DURATION TIME PATIENT: NORMAL H
GFR SERPLBLD BASED ON 1.73 SQ M-ARVRAT: 51 ML/MIN/1.73M2
GLOBULIN SER-MCNC: 3.8 G/DL (ref 2–4)
GLUCOSE BLDC GLUCOMTR-MCNC: 121 MG/DL (ref 70–99)
GLUCOSE BLDC GLUCOMTR-MCNC: 123 MG/DL (ref 70–99)
GLUCOSE BLDC GLUCOMTR-MCNC: 133 MG/DL (ref 70–99)
GLUCOSE BLDC GLUCOMTR-MCNC: 135 MG/DL (ref 70–99)
GLUCOSE SERPL-MCNC: 130 MG/DL (ref 65–99)
HCT VFR BLD CALC: 23.6 % (ref 36–46.5)
HCT VFR BLD CALC: 23.6 % (ref 36–46.5)
HGB BLD-MCNC: 7.4 G/DL (ref 12–15.5)
HGB BLD-MCNC: 7.5 G/DL (ref 12–15.5)
MAGNESIUM SERPL-MCNC: 2 MG/DL (ref 1.7–2.4)
MCH RBC QN AUTO: 30 PG (ref 26–34)
MCHC RBC AUTO-ENTMCNC: 31.8 G/DL (ref 32–36.5)
MCV RBC AUTO: 94.4 FL (ref 78–100)
NRBC BLD MANUAL-RTO: 0 /100 WBC
PHOSPHATE SERPL-MCNC: 2.3 MG/DL (ref 2.4–4.7)
PLATELET # BLD AUTO: 85 K/MCL (ref 140–450)
POTASSIUM SERPL-SCNC: 4.3 MMOL/L (ref 3.4–5.1)
PROT SERPL-MCNC: 5.2 G/DL (ref 6.4–8.2)
RBC # BLD: 2.5 MIL/MCL (ref 4–5.2)
SODIUM SERPL-SCNC: 142 MMOL/L (ref 135–145)
TRIGL SERPL-MCNC: 75 MG/DL
WBC # BLD: 16.1 K/MCL (ref 4.2–11)

## 2021-06-29 PROCEDURE — C9113 INJ PANTOPRAZOLE SODIUM, VIA: HCPCS | Performed by: INTERNAL MEDICINE

## 2021-06-29 PROCEDURE — 10000008 HB ROOM CHARGE ICU OR CCU

## 2021-06-29 PROCEDURE — 10002807 HB RX 258: Performed by: NURSE PRACTITIONER

## 2021-06-29 PROCEDURE — 84100 ASSAY OF PHOSPHORUS: CPT | Performed by: INTERNAL MEDICINE

## 2021-06-29 PROCEDURE — 10004451 HB PACU RECOVERY 1ST 30 MINUTES

## 2021-06-29 PROCEDURE — 13000024 HB GI COMPLEX CASE S/U + 1ST 15 MIN

## 2021-06-29 PROCEDURE — 84478 ASSAY OF TRIGLYCERIDES: CPT

## 2021-06-29 PROCEDURE — 10002807 HB RX 258: Performed by: INTERNAL MEDICINE

## 2021-06-29 PROCEDURE — 13000008 HB ANESTHESIA MAC OUTSIDE OR

## 2021-06-29 PROCEDURE — 13000001 HB PHASE II RECOVERY EA 30 MINUTES

## 2021-06-29 PROCEDURE — 85014 HEMATOCRIT: CPT | Performed by: EMERGENCY MEDICINE

## 2021-06-29 PROCEDURE — 13003289 HB OXYGEN THERAPY DAILY

## 2021-06-29 PROCEDURE — 13003292 HB HHF OXYGEN THERAPY DAILY

## 2021-06-29 PROCEDURE — 10006023 HB SUPPLY 272

## 2021-06-29 PROCEDURE — 10002800 HB RX 250 W HCPCS: Performed by: NURSE PRACTITIONER

## 2021-06-29 PROCEDURE — 10002803 HB RX 637: Performed by: EMERGENCY MEDICINE

## 2021-06-29 PROCEDURE — 0DP58DZ REMOVAL OF INTRALUMINAL DEVICE FROM ESOPHAGUS, VIA NATURAL OR ARTIFICIAL OPENING ENDOSCOPIC: ICD-10-PCS | Performed by: INTERNAL MEDICINE

## 2021-06-29 PROCEDURE — 83735 ASSAY OF MAGNESIUM: CPT | Performed by: INTERNAL MEDICINE

## 2021-06-29 PROCEDURE — 10002800 HB RX 250 W HCPCS: Performed by: INTERNAL MEDICINE

## 2021-06-29 PROCEDURE — 10002800 HB RX 250 W HCPCS: Performed by: ANESTHESIOLOGY

## 2021-06-29 PROCEDURE — 10004281 HB COUNTER-STAFF TIME PER 15 MIN

## 2021-06-29 PROCEDURE — 85027 COMPLETE CBC AUTOMATED: CPT | Performed by: HOSPITALIST

## 2021-06-29 PROCEDURE — 80053 COMPREHEN METABOLIC PANEL: CPT | Performed by: HOSPITALIST

## 2021-06-29 PROCEDURE — 71045 X-RAY EXAM CHEST 1 VIEW: CPT

## 2021-06-29 RX ORDER — PHENYLEPHRINE HYDROCHLORIDE 10 MG/ML
INJECTION, SOLUTION INTRAMUSCULAR; INTRAVENOUS; SUBCUTANEOUS PRN
Status: DISCONTINUED | OUTPATIENT
Start: 2021-06-29 | End: 2021-06-29

## 2021-06-29 RX ORDER — PROPOFOL 10 MG/ML
INJECTION, EMULSION INTRAVENOUS PRN
Status: DISCONTINUED | OUTPATIENT
Start: 2021-06-29 | End: 2021-06-29

## 2021-06-29 RX ADMIN — MEROPENEM 500 MG: 500 INJECTION INTRAVENOUS at 23:40

## 2021-06-29 RX ADMIN — I.V. FAT EMULSION 250 ML: 20 EMULSION INTRAVENOUS at 22:42

## 2021-06-29 RX ADMIN — AMIODARONE HYDROCHLORIDE 0.5 MG/MIN: 50 INJECTION, SOLUTION INTRAVENOUS at 22:41

## 2021-06-29 RX ADMIN — PANTOPRAZOLE SODIUM 40 MG: 40 INJECTION, POWDER, FOR SOLUTION INTRAVENOUS at 20:23

## 2021-06-29 RX ADMIN — PANTOPRAZOLE SODIUM 40 MG: 40 INJECTION, POWDER, FOR SOLUTION INTRAVENOUS at 08:37

## 2021-06-29 RX ADMIN — LIDOCAINE 1 PATCH: 246 PATCH TOPICAL at 08:36

## 2021-06-29 RX ADMIN — PROPOFOL 25 MG: 10 INJECTION, EMULSION INTRAVENOUS at 14:08

## 2021-06-29 RX ADMIN — MEROPENEM 500 MG: 500 INJECTION INTRAVENOUS at 08:46

## 2021-06-29 RX ADMIN — LYSINE, LEUCINE, PHENYLALANINE, VALINE, HISTIDINE, ISOLEUCINE, METHIONINE, THREONINE, TRYPTOPHAN, ALANINE, ARGININE, GLYCINE, PROLINE, GLUTAMIC ACID, SERINE, ASPARTIC ACID, TYROSINE
1.18; 1.04; 1.04; 960; 894; 749; 749; 749; 250; 2.17; 1.47; 1.04; 894; 749; 592; 434; 39 INJECTION, SOLUTION INTRAVENOUS at 22:43

## 2021-06-29 RX ADMIN — PHENYLEPHRINE HYDROCHLORIDE 100 MCG: 10 INJECTION INTRAVENOUS at 14:15

## 2021-06-29 RX ADMIN — FENTANYL CITRATE 50 MCG: 50 INJECTION, SOLUTION INTRAMUSCULAR; INTRAVENOUS at 14:08

## 2021-06-29 RX ADMIN — PROPOFOL 50 MCG/KG/MIN: 10 INJECTION, EMULSION INTRAVENOUS at 14:08

## 2021-06-29 RX ADMIN — AMIODARONE HYDROCHLORIDE 0.5 MG/MIN: 50 INJECTION, SOLUTION INTRAVENOUS at 07:17

## 2021-06-29 ASSESSMENT — PAIN SCALES - WONG BAKER
WONGBAKER_NUMERICALRESPONSE: 0

## 2021-06-29 ASSESSMENT — PAIN SCALES - GENERAL: PAINLEVEL_OUTOF10: 0

## 2021-06-30 LAB
ALBUMIN SERPL-MCNC: 1.4 G/DL (ref 3.6–5.1)
ALBUMIN/GLOB SERPL: 0.4 {RATIO} (ref 1–2.4)
ALP SERPL-CCNC: 141 UNITS/L (ref 45–117)
ALT SERPL-CCNC: 121 UNITS/L
ANION GAP SERPL CALC-SCNC: 7 MMOL/L (ref 10–20)
AST SERPL-CCNC: 48 UNITS/L
BASOPHILS # BLD: 0 K/MCL (ref 0–0.3)
BASOPHILS NFR BLD: 0 %
BILIRUB SERPL-MCNC: 1.1 MG/DL (ref 0.2–1)
BLOOD EXPIRATION DATE: NORMAL
BUN SERPL-MCNC: 59 MG/DL (ref 6–20)
BUN/CREAT SERPL: 66 (ref 7–25)
CALCIUM SERPL-MCNC: 8.1 MG/DL (ref 8.4–10.2)
CHLORIDE SERPL-SCNC: 105 MMOL/L (ref 98–107)
CO2 SERPL-SCNC: 30 MMOL/L (ref 21–32)
CREAT SERPL-MCNC: 0.89 MG/DL (ref 0.51–0.95)
CROSSMATCH RESULT: NORMAL
DEPRECATED RDW RBC: 54.4 FL (ref 39–50)
DISPENSE STATUS: NORMAL
EOSINOPHIL # BLD: 0.1 K/MCL (ref 0–0.5)
EOSINOPHIL NFR BLD: 1 %
ERYTHROCYTE [DISTWIDTH] IN BLOOD: 16 % (ref 11–15)
FASTING DURATION TIME PATIENT: ABNORMAL H
GFR SERPLBLD BASED ON 1.73 SQ M-ARVRAT: 57 ML/MIN/1.73M2
GLOBULIN SER-MCNC: 4 G/DL (ref 2–4)
GLUCOSE BLDC GLUCOMTR-MCNC: 123 MG/DL (ref 70–99)
GLUCOSE BLDC GLUCOMTR-MCNC: 123 MG/DL (ref 70–99)
GLUCOSE BLDC GLUCOMTR-MCNC: 125 MG/DL (ref 70–99)
GLUCOSE SERPL-MCNC: 126 MG/DL (ref 65–99)
HCT VFR BLD CALC: 22.3 % (ref 36–46.5)
HCT VFR BLD CALC: 26.5 % (ref 36–46.5)
HGB BLD-MCNC: 7 G/DL (ref 12–15.5)
HGB BLD-MCNC: 8.5 G/DL (ref 12–15.5)
IMM GRANULOCYTES # BLD AUTO: 0.2 K/MCL (ref 0–0.2)
IMM GRANULOCYTES # BLD: 1 %
ISBT BLOOD TYPE: 1700
ISSUE DATE/TIME: NORMAL
LYMPHOCYTES # BLD: 0.6 K/MCL (ref 1–4)
LYMPHOCYTES NFR BLD: 4 %
MAGNESIUM SERPL-MCNC: 2 MG/DL (ref 1.7–2.4)
MCH RBC QN AUTO: 29.4 PG (ref 26–34)
MCHC RBC AUTO-ENTMCNC: 31.4 G/DL (ref 32–36.5)
MCV RBC AUTO: 93.7 FL (ref 78–100)
MONOCYTES # BLD: 0.7 K/MCL (ref 0.3–0.9)
MONOCYTES NFR BLD: 5 %
NEUTROPHILS # BLD: 13.2 K/MCL (ref 1.8–7.7)
NEUTROPHILS NFR BLD: 89 %
NRBC BLD MANUAL-RTO: 0 /100 WBC
PHOSPHATE SERPL-MCNC: 2.7 MG/DL (ref 2.4–4.7)
PLATELET # BLD AUTO: 87 K/MCL (ref 140–450)
POTASSIUM SERPL-SCNC: 4.4 MMOL/L (ref 3.4–5.1)
PRODUCT CODE: NORMAL
PRODUCT DESCRIPTION: NORMAL
PRODUCT ID: NORMAL
PROT SERPL-MCNC: 5.4 G/DL (ref 6.4–8.2)
RBC # BLD: 2.38 MIL/MCL (ref 4–5.2)
SODIUM SERPL-SCNC: 138 MMOL/L (ref 135–145)
UNIT BLOOD TYPE: NORMAL
UNIT NUMBER: NORMAL
WBC # BLD: 14.8 K/MCL (ref 4.2–11)

## 2021-06-30 PROCEDURE — 10004281 HB COUNTER-STAFF TIME PER 15 MIN

## 2021-06-30 PROCEDURE — 10002803 HB RX 637: Performed by: EMERGENCY MEDICINE

## 2021-06-30 PROCEDURE — 10002800 HB RX 250 W HCPCS: Performed by: INTERNAL MEDICINE

## 2021-06-30 PROCEDURE — 36415 COLL VENOUS BLD VENIPUNCTURE: CPT | Performed by: INTERNAL MEDICINE

## 2021-06-30 PROCEDURE — 13003292 HB HHF OXYGEN THERAPY DAILY

## 2021-06-30 PROCEDURE — 84100 ASSAY OF PHOSPHORUS: CPT | Performed by: EMERGENCY MEDICINE

## 2021-06-30 PROCEDURE — 10002803 HB RX 637: Performed by: HOSPITALIST

## 2021-06-30 PROCEDURE — 10002807 HB RX 258: Performed by: INTERNAL MEDICINE

## 2021-06-30 PROCEDURE — 87040 BLOOD CULTURE FOR BACTERIA: CPT | Performed by: INTERNAL MEDICINE

## 2021-06-30 PROCEDURE — 85018 HEMOGLOBIN: CPT | Performed by: INTERNAL MEDICINE

## 2021-06-30 PROCEDURE — 85025 COMPLETE CBC W/AUTO DIFF WBC: CPT | Performed by: EMERGENCY MEDICINE

## 2021-06-30 PROCEDURE — 10000002 HB ROOM CHARGE MED SURG

## 2021-06-30 PROCEDURE — 10002800 HB RX 250 W HCPCS: Performed by: NURSE PRACTITIONER

## 2021-06-30 PROCEDURE — 10002807 HB RX 258: Performed by: HOSPITALIST

## 2021-06-30 PROCEDURE — 10002807 HB RX 258: Performed by: NURSE PRACTITIONER

## 2021-06-30 PROCEDURE — 80053 COMPREHEN METABOLIC PANEL: CPT | Performed by: EMERGENCY MEDICINE

## 2021-06-30 PROCEDURE — P9016 RBC LEUKOCYTES REDUCED: HCPCS

## 2021-06-30 PROCEDURE — C9113 INJ PANTOPRAZOLE SODIUM, VIA: HCPCS | Performed by: INTERNAL MEDICINE

## 2021-06-30 PROCEDURE — 83735 ASSAY OF MAGNESIUM: CPT | Performed by: EMERGENCY MEDICINE

## 2021-06-30 PROCEDURE — 10002800 HB RX 250 W HCPCS: Performed by: HOSPITALIST

## 2021-06-30 RX ORDER — BALSAM PERU/CASTOR OIL
OINTMENT (GRAM) TOPICAL 2 TIMES DAILY
Status: DISCONTINUED | OUTPATIENT
Start: 2021-06-30 | End: 2021-07-08 | Stop reason: HOSPADM

## 2021-06-30 RX ORDER — SODIUM CHLORIDE 9 MG/ML
INJECTION, SOLUTION INTRAVENOUS CONTINUOUS PRN
Status: DISCONTINUED | OUTPATIENT
Start: 2021-06-30 | End: 2021-07-08 | Stop reason: HOSPADM

## 2021-06-30 RX ORDER — FUROSEMIDE 10 MG/ML
20 INJECTION INTRAMUSCULAR; INTRAVENOUS ONCE
Status: COMPLETED | OUTPATIENT
Start: 2021-06-30 | End: 2021-06-30

## 2021-06-30 RX ADMIN — AMIODARONE HYDROCHLORIDE 0.5 MG/MIN: 50 INJECTION, SOLUTION INTRAVENOUS at 12:03

## 2021-06-30 RX ADMIN — MEROPENEM 500 MG: 500 INJECTION INTRAVENOUS at 16:31

## 2021-06-30 RX ADMIN — MEROPENEM 500 MG: 500 INJECTION INTRAVENOUS at 23:51

## 2021-06-30 RX ADMIN — PANTOPRAZOLE SODIUM 40 MG: 40 INJECTION, POWDER, FOR SOLUTION INTRAVENOUS at 08:13

## 2021-06-30 RX ADMIN — FUROSEMIDE 20 MG: 10 INJECTION, SOLUTION INTRAMUSCULAR; INTRAVENOUS at 11:20

## 2021-06-30 RX ADMIN — PANTOPRAZOLE SODIUM 40 MG: 40 INJECTION, POWDER, FOR SOLUTION INTRAVENOUS at 21:02

## 2021-06-30 RX ADMIN — LIDOCAINE 1 PATCH: 246 PATCH TOPICAL at 08:13

## 2021-06-30 RX ADMIN — LYSINE, LEUCINE, PHENYLALANINE, VALINE, HISTIDINE, ISOLEUCINE, METHIONINE, THREONINE, TRYPTOPHAN, ALANINE, ARGININE, GLYCINE, PROLINE, GLUTAMIC ACID, SERINE, ASPARTIC ACID, TYROSINE
1.18; 1.04; 1.04; 960; 894; 749; 749; 749; 250; 2.17; 1.47; 1.04; 894; 749; 592; 434; 39 INJECTION, SOLUTION INTRAVENOUS at 21:00

## 2021-06-30 RX ADMIN — MEROPENEM 500 MG: 500 INJECTION INTRAVENOUS at 08:13

## 2021-06-30 RX ADMIN — Medication: at 21:02

## 2021-06-30 RX ADMIN — I.V. FAT EMULSION 250 ML: 20 EMULSION INTRAVENOUS at 20:59

## 2021-06-30 ASSESSMENT — PAIN SCALES - GENERAL
PAINLEVEL_OUTOF10: 0

## 2021-06-30 ASSESSMENT — MOVEMENT AND STRENGTH ASSESSMENTS
LUE_ASSESSMENT: FAIR/COMPLETE ROM AGAINST GRAVITY, NO ADDED RESISTANCE
LLE_ASSESSMENT: FAIR/COMPLETE ROM AGAINST GRAVITY, NO ADDED RESISTANCE
RUE_ASSESSMENT: FAIR/COMPLETE ROM AGAINST GRAVITY, NO ADDED RESISTANCE
RLE_ASSESSMENT: FAIR/COMPLETE ROM AGAINST GRAVITY, NO ADDED RESISTANCE
RUE_ASSESSMENT: FAIR/COMPLETE ROM AGAINST GRAVITY, NO ADDED RESISTANCE
LLE_ASSESSMENT: FAIR/COMPLETE ROM AGAINST GRAVITY, NO ADDED RESISTANCE
RLE_ASSESSMENT: FAIR/COMPLETE ROM AGAINST GRAVITY, NO ADDED RESISTANCE
LLE_ASSESSMENT: FAIR/COMPLETE ROM AGAINST GRAVITY, NO ADDED RESISTANCE
RUE_ASSESSMENT: FAIR/COMPLETE ROM AGAINST GRAVITY, NO ADDED RESISTANCE
RLE_ASSESSMENT: FAIR/COMPLETE ROM AGAINST GRAVITY, NO ADDED RESISTANCE

## 2021-06-30 ASSESSMENT — PAIN SCALES - BEHAVIORAL PAIN SCALE (BPS): BPS_SCORE: 3

## 2021-07-01 LAB
ALBUMIN SERPL-MCNC: 1.3 G/DL (ref 3.6–5.1)
ALBUMIN/GLOB SERPL: 0.3 {RATIO} (ref 1–2.4)
ALP SERPL-CCNC: 160 UNITS/L (ref 45–117)
ALT SERPL-CCNC: 99 UNITS/L
ANION GAP SERPL CALC-SCNC: 7 MMOL/L (ref 10–20)
AST SERPL-CCNC: 52 UNITS/L
BASOPHILS # BLD: 0 K/MCL (ref 0–0.3)
BASOPHILS NFR BLD: 0 %
BILIRUB SERPL-MCNC: 1.2 MG/DL (ref 0.2–1)
BUN SERPL-MCNC: 53 MG/DL (ref 6–20)
BUN/CREAT SERPL: 62 (ref 7–25)
CALCIUM SERPL-MCNC: 7.7 MG/DL (ref 8.4–10.2)
CHLORIDE SERPL-SCNC: 103 MMOL/L (ref 98–107)
CO2 SERPL-SCNC: 29 MMOL/L (ref 21–32)
CREAT SERPL-MCNC: 0.86 MG/DL (ref 0.51–0.95)
DEPRECATED RDW RBC: 53.1 FL (ref 39–50)
EOSINOPHIL # BLD: 0.2 K/MCL (ref 0–0.5)
EOSINOPHIL NFR BLD: 1 %
ERYTHROCYTE [DISTWIDTH] IN BLOOD: 17 % (ref 11–15)
FASTING DURATION TIME PATIENT: ABNORMAL H
GFR SERPLBLD BASED ON 1.73 SQ M-ARVRAT: 59 ML/MIN/1.73M2
GLOBULIN SER-MCNC: 4.1 G/DL (ref 2–4)
GLUCOSE BLDC GLUCOMTR-MCNC: 130 MG/DL (ref 70–99)
GLUCOSE BLDC GLUCOMTR-MCNC: 132 MG/DL (ref 70–99)
GLUCOSE BLDC GLUCOMTR-MCNC: 94 MG/DL (ref 70–99)
GLUCOSE BLDC GLUCOMTR-MCNC: 98 MG/DL (ref 70–99)
GLUCOSE SERPL-MCNC: 122 MG/DL (ref 65–99)
HCT VFR BLD CALC: 25.5 % (ref 36–46.5)
HGB BLD-MCNC: 8.3 G/DL (ref 12–15.5)
LYMPHOCYTES # BLD: 0.2 K/MCL (ref 1–4)
LYMPHOCYTES NFR BLD: 1 %
MAGNESIUM SERPL-MCNC: 1.8 MG/DL (ref 1.7–2.4)
MCH RBC QN AUTO: 28.5 PG (ref 26–34)
MCHC RBC AUTO-ENTMCNC: 32.5 G/DL (ref 32–36.5)
MCV RBC AUTO: 87.6 FL (ref 78–100)
MONOCYTES # BLD: 0.3 K/MCL (ref 0.3–0.9)
MONOCYTES NFR BLD: 2 %
NEUTROPHILS # BLD: 14.6 K/MCL (ref 1.8–7.7)
NEUTS BAND NFR BLD: 1 % (ref 0–10)
NEUTS SEG NFR BLD: 95 %
NRBC BLD MANUAL-RTO: 0 /100 WBC
PHOSPHATE SERPL-MCNC: 2.9 MG/DL (ref 2.4–4.7)
PLAT MORPH BLD: NORMAL
PLATELET # BLD AUTO: 89 K/MCL (ref 140–450)
POLYCHROMASIA BLD QL SMEAR: ABNORMAL
POTASSIUM SERPL-SCNC: 3.9 MMOL/L (ref 3.4–5.1)
PROCALCITONIN SERPL IA-MCNC: 0.23 NG/ML
PROT SERPL-MCNC: 5.4 G/DL (ref 6.4–8.2)
RBC # BLD: 2.91 MIL/MCL (ref 4–5.2)
SODIUM SERPL-SCNC: 135 MMOL/L (ref 135–145)
WBC # BLD: 15.2 K/MCL (ref 4.2–11)
WBC TOXIC VACUOLES BLD QL SMEAR: PRESENT

## 2021-07-01 PROCEDURE — 80053 COMPREHEN METABOLIC PANEL: CPT | Performed by: EMERGENCY MEDICINE

## 2021-07-01 PROCEDURE — 97168 OT RE-EVAL EST PLAN CARE: CPT

## 2021-07-01 PROCEDURE — 10002807 HB RX 258: Performed by: NURSE PRACTITIONER

## 2021-07-01 PROCEDURE — 10002800 HB RX 250 W HCPCS: Performed by: INTERNAL MEDICINE

## 2021-07-01 PROCEDURE — 10002807 HB RX 258: Performed by: INTERNAL MEDICINE

## 2021-07-01 PROCEDURE — 10004281 HB COUNTER-STAFF TIME PER 15 MIN

## 2021-07-01 PROCEDURE — 97162 PT EVAL MOD COMPLEX 30 MIN: CPT

## 2021-07-01 PROCEDURE — 13003292 HB HHF OXYGEN THERAPY DAILY

## 2021-07-01 PROCEDURE — 10003585 HB ROOM CHARGE INTERMEDIATE CARE

## 2021-07-01 PROCEDURE — 97530 THERAPEUTIC ACTIVITIES: CPT

## 2021-07-01 PROCEDURE — 83735 ASSAY OF MAGNESIUM: CPT | Performed by: EMERGENCY MEDICINE

## 2021-07-01 PROCEDURE — 10002807 HB RX 258: Performed by: HOSPITALIST

## 2021-07-01 PROCEDURE — C9113 INJ PANTOPRAZOLE SODIUM, VIA: HCPCS | Performed by: INTERNAL MEDICINE

## 2021-07-01 PROCEDURE — 84145 PROCALCITONIN (PCT): CPT | Performed by: INTERNAL MEDICINE

## 2021-07-01 PROCEDURE — 85027 COMPLETE CBC AUTOMATED: CPT | Performed by: EMERGENCY MEDICINE

## 2021-07-01 PROCEDURE — 10002800 HB RX 250 W HCPCS: Performed by: NURSE PRACTITIONER

## 2021-07-01 PROCEDURE — 84100 ASSAY OF PHOSPHORUS: CPT | Performed by: INTERNAL MEDICINE

## 2021-07-01 PROCEDURE — 10002803 HB RX 637: Performed by: EMERGENCY MEDICINE

## 2021-07-01 PROCEDURE — 10002800 HB RX 250 W HCPCS: Performed by: HOSPITALIST

## 2021-07-01 RX ORDER — FUROSEMIDE 10 MG/ML
20 INJECTION INTRAMUSCULAR; INTRAVENOUS DAILY
Status: DISCONTINUED | OUTPATIENT
Start: 2021-07-01 | End: 2021-07-08

## 2021-07-01 RX ORDER — POTASSIUM CHLORIDE 14.9 MG/ML
20 INJECTION INTRAVENOUS ONCE
Status: COMPLETED | OUTPATIENT
Start: 2021-07-01 | End: 2021-07-01

## 2021-07-01 RX ORDER — POTASSIUM CHLORIDE 14.9 MG/ML
20 INJECTION INTRAVENOUS ONCE
Status: DISCONTINUED | OUTPATIENT
Start: 2021-07-01 | End: 2021-07-08

## 2021-07-01 RX ADMIN — PANTOPRAZOLE SODIUM 40 MG: 40 INJECTION, POWDER, FOR SOLUTION INTRAVENOUS at 08:31

## 2021-07-01 RX ADMIN — MORPHINE SULFATE 0.5 MG: 2 INJECTION, SOLUTION INTRAMUSCULAR; INTRAVENOUS at 21:45

## 2021-07-01 RX ADMIN — POTASSIUM CHLORIDE 20 MEQ: 14.9 INJECTION, SOLUTION INTRAVENOUS at 09:41

## 2021-07-01 RX ADMIN — Medication: at 09:43

## 2021-07-01 RX ADMIN — I.V. FAT EMULSION 250 ML: 20 EMULSION INTRAVENOUS at 21:25

## 2021-07-01 RX ADMIN — FUROSEMIDE 20 MG: 10 INJECTION, SOLUTION INTRAMUSCULAR; INTRAVENOUS at 14:38

## 2021-07-01 RX ADMIN — LYSINE, LEUCINE, PHENYLALANINE, VALINE, HISTIDINE, ISOLEUCINE, METHIONINE, THREONINE, TRYPTOPHAN, ALANINE, ARGININE, GLYCINE, PROLINE, GLUTAMIC ACID, SERINE, ASPARTIC ACID, TYROSINE
1.18; 1.04; 1.04; 960; 894; 749; 749; 749; 250; 2.17; 1.47; 1.04; 894; 749; 592; 434; 39 INJECTION, SOLUTION INTRAVENOUS at 21:26

## 2021-07-01 RX ADMIN — LIDOCAINE 1 PATCH: 246 PATCH TOPICAL at 09:00

## 2021-07-01 RX ADMIN — PANTOPRAZOLE SODIUM 40 MG: 40 INJECTION, POWDER, FOR SOLUTION INTRAVENOUS at 21:16

## 2021-07-01 RX ADMIN — MEROPENEM 500 MG: 500 INJECTION INTRAVENOUS at 23:16

## 2021-07-01 RX ADMIN — MEROPENEM 500 MG: 500 INJECTION INTRAVENOUS at 16:31

## 2021-07-01 RX ADMIN — AMIODARONE HYDROCHLORIDE 0.5 MG/MIN: 50 INJECTION, SOLUTION INTRAVENOUS at 04:18

## 2021-07-01 RX ADMIN — MEROPENEM 500 MG: 500 INJECTION INTRAVENOUS at 08:31

## 2021-07-01 RX ADMIN — AMIODARONE HYDROCHLORIDE 0.5 MG/MIN: 50 INJECTION, SOLUTION INTRAVENOUS at 21:17

## 2021-07-01 RX ADMIN — MORPHINE SULFATE 0.5 MG: 2 INJECTION, SOLUTION INTRAMUSCULAR; INTRAVENOUS at 09:41

## 2021-07-01 ASSESSMENT — MOVEMENT AND STRENGTH ASSESSMENTS
LUE_ASSESSMENT: POOR/COMPLETE ROM WITH GRAVITY ELIMINATED
RUE_ASSESSMENT: POOR/COMPLETE ROM WITH GRAVITY ELIMINATED
LLE_ASSESSMENT: POOR/COMPLETE ROM WITH GRAVITY ELIMINATED
RLE_ASSESSMENT: POOR/COMPLETE ROM WITH GRAVITY ELIMINATED

## 2021-07-01 ASSESSMENT — COGNITIVE AND FUNCTIONAL STATUS - GENERAL
REMEMBERING WHERE THINGS ARE: A LOT
HELP NEEDED DRESSING REGULAR LOWER BODY CLOTHING: TOTAL
DAILY_ACTIVITY_RAW_SCORE: 6
UNDERSTANDING 10 TO 15 MIN SPEECH: A LOT
BASIC_MOBILITY_RAW_SCORE: 8
REMEMBERING 5 ERRANDS WITH NO LIST: UNABLE
REMEMBERING TO TAKE MEDICATION: UNABLE
HELP NEEDED DRESSING REGULAR UPPER BODY CLOTHING: TOTAL
HELP NEEDED FOR TOILETING: TOTAL
FOLLOWS FAMILIAR CONVERSATION: A LITTLE
BASIC_MOBILITY_CONVERTED_SCORE: 22.61
APPLIED_COGNITIVE_RAW_SCORE: 10
HELP NEEDED FOR BATHING: TOTAL
HELP NEEDED FOR PERSONAL GROOMING: TOTAL
DAILY_ACTIVITY_CONVERTED_SCORE: 17.06
TAKING CARE OF COMPLICATED TASKS: UNABLE
APPLIED_COGNITIVE_CONVERTED_SCORE: 24.98

## 2021-07-01 ASSESSMENT — PAIN SCALES - BEHAVIORAL PAIN SCALE (BPS)
BPS_SCORE: 3
BPS_SCORE: 3

## 2021-07-01 ASSESSMENT — PAIN SCALES - GENERAL
PAINLEVEL_OUTOF10: 0
PAINLEVEL_OUTOF10: 3

## 2021-07-02 LAB
ALBUMIN SERPL-MCNC: 1.3 G/DL (ref 3.6–5.1)
ALBUMIN/GLOB SERPL: 0.3 {RATIO} (ref 1–2.4)
ALP SERPL-CCNC: 155 UNITS/L (ref 45–117)
ALT SERPL-CCNC: 78 UNITS/L
ANION GAP SERPL CALC-SCNC: 11 MMOL/L (ref 10–20)
AST SERPL-CCNC: 29 UNITS/L
BASOPHILS # BLD: 0 K/MCL (ref 0–0.3)
BASOPHILS NFR BLD: 0 %
BILIRUB SERPL-MCNC: 1 MG/DL (ref 0.2–1)
BUN SERPL-MCNC: 52 MG/DL (ref 6–20)
BUN/CREAT SERPL: 63 (ref 7–25)
CALCIUM SERPL-MCNC: 7.9 MG/DL (ref 8.4–10.2)
CHLORIDE SERPL-SCNC: 102 MMOL/L (ref 98–107)
CO2 SERPL-SCNC: 26 MMOL/L (ref 21–32)
CREAT SERPL-MCNC: 0.83 MG/DL (ref 0.51–0.95)
DEPRECATED RDW RBC: 53.9 FL (ref 39–50)
EOSINOPHIL # BLD: 0.1 K/MCL (ref 0–0.5)
EOSINOPHIL NFR BLD: 1 %
ERYTHROCYTE [DISTWIDTH] IN BLOOD: 16.6 % (ref 11–15)
FASTING DURATION TIME PATIENT: ABNORMAL H
GFR SERPLBLD BASED ON 1.73 SQ M-ARVRAT: 62 ML/MIN/1.73M2
GLOBULIN SER-MCNC: 4.1 G/DL (ref 2–4)
GLUCOSE BLDC GLUCOMTR-MCNC: 120 MG/DL (ref 70–99)
GLUCOSE BLDC GLUCOMTR-MCNC: 130 MG/DL (ref 70–99)
GLUCOSE BLDC GLUCOMTR-MCNC: 134 MG/DL (ref 70–99)
GLUCOSE BLDC GLUCOMTR-MCNC: 136 MG/DL (ref 70–99)
GLUCOSE SERPL-MCNC: 121 MG/DL (ref 65–99)
HCT VFR BLD CALC: 26.6 % (ref 36–46.5)
HGB BLD-MCNC: 8.5 G/DL (ref 12–15.5)
LYMPHOCYTES # BLD: 0.3 K/MCL (ref 1–4)
LYMPHOCYTES NFR BLD: 2 %
MAGNESIUM SERPL-MCNC: 1.9 MG/DL (ref 1.7–2.4)
MCH RBC QN AUTO: 28.3 PG (ref 26–34)
MCHC RBC AUTO-ENTMCNC: 32 G/DL (ref 32–36.5)
MCV RBC AUTO: 88.7 FL (ref 78–100)
MONOCYTES # BLD: 0.3 K/MCL (ref 0.3–0.9)
MONOCYTES NFR BLD: 2 %
NEUTROPHILS # BLD: 12.8 K/MCL (ref 1.8–7.7)
NEUTS SEG NFR BLD: 95 %
NRBC BLD MANUAL-RTO: 0 /100 WBC
PHOSPHATE SERPL-MCNC: 3.3 MG/DL (ref 2.4–4.7)
PLAT MORPH BLD: NORMAL
PLATELET # BLD AUTO: 99 K/MCL (ref 140–450)
POLYCHROMASIA BLD QL SMEAR: ABNORMAL
POTASSIUM SERPL-SCNC: 3.9 MMOL/L (ref 3.4–5.1)
PROT SERPL-MCNC: 5.4 G/DL (ref 6.4–8.2)
RBC # BLD: 3 MIL/MCL (ref 4–5.2)
SODIUM SERPL-SCNC: 135 MMOL/L (ref 135–145)
WBC # BLD: 13.5 K/MCL (ref 4.2–11)
WBC TOXIC VACUOLES BLD QL SMEAR: PRESENT

## 2021-07-02 PROCEDURE — 13003289 HB OXYGEN THERAPY DAILY

## 2021-07-02 PROCEDURE — 10002807 HB RX 258: Performed by: INTERNAL MEDICINE

## 2021-07-02 PROCEDURE — 97535 SELF CARE MNGMENT TRAINING: CPT

## 2021-07-02 PROCEDURE — 10002800 HB RX 250 W HCPCS: Performed by: INTERNAL MEDICINE

## 2021-07-02 PROCEDURE — 85027 COMPLETE CBC AUTOMATED: CPT | Performed by: EMERGENCY MEDICINE

## 2021-07-02 PROCEDURE — 10002800 HB RX 250 W HCPCS: Performed by: NURSE PRACTITIONER

## 2021-07-02 PROCEDURE — 10002807 HB RX 258: Performed by: HOSPITALIST

## 2021-07-02 PROCEDURE — 10002807 HB RX 258: Performed by: NURSE PRACTITIONER

## 2021-07-02 PROCEDURE — 97110 THERAPEUTIC EXERCISES: CPT

## 2021-07-02 PROCEDURE — 10002803 HB RX 637: Performed by: EMERGENCY MEDICINE

## 2021-07-02 PROCEDURE — 10004281 HB COUNTER-STAFF TIME PER 15 MIN

## 2021-07-02 PROCEDURE — 84100 ASSAY OF PHOSPHORUS: CPT | Performed by: INTERNAL MEDICINE

## 2021-07-02 PROCEDURE — 83735 ASSAY OF MAGNESIUM: CPT | Performed by: EMERGENCY MEDICINE

## 2021-07-02 PROCEDURE — 10003585 HB ROOM CHARGE INTERMEDIATE CARE

## 2021-07-02 PROCEDURE — 80053 COMPREHEN METABOLIC PANEL: CPT | Performed by: EMERGENCY MEDICINE

## 2021-07-02 PROCEDURE — C9113 INJ PANTOPRAZOLE SODIUM, VIA: HCPCS | Performed by: INTERNAL MEDICINE

## 2021-07-02 RX ADMIN — Medication: at 21:28

## 2021-07-02 RX ADMIN — MORPHINE SULFATE 2 MG: 2 INJECTION, SOLUTION INTRAMUSCULAR; INTRAVENOUS at 18:41

## 2021-07-02 RX ADMIN — MORPHINE SULFATE 1 MG: 2 INJECTION, SOLUTION INTRAMUSCULAR; INTRAVENOUS at 16:04

## 2021-07-02 RX ADMIN — ACYCLOVIR SODIUM 239 MG: 1000 INJECTION, SOLUTION INTRAVENOUS at 18:35

## 2021-07-02 RX ADMIN — MORPHINE SULFATE 2 MG: 2 INJECTION, SOLUTION INTRAMUSCULAR; INTRAVENOUS at 21:40

## 2021-07-02 RX ADMIN — AMIODARONE HYDROCHLORIDE 0.5 MG/MIN: 50 INJECTION, SOLUTION INTRAVENOUS at 14:01

## 2021-07-02 RX ADMIN — I.V. FAT EMULSION 250 ML: 20 EMULSION INTRAVENOUS at 21:28

## 2021-07-02 RX ADMIN — MORPHINE SULFATE 0.5 MG: 2 INJECTION, SOLUTION INTRAMUSCULAR; INTRAVENOUS at 01:35

## 2021-07-02 RX ADMIN — MEROPENEM 500 MG: 500 INJECTION INTRAVENOUS at 23:32

## 2021-07-02 RX ADMIN — Medication: at 11:18

## 2021-07-02 RX ADMIN — MEROPENEM 500 MG: 500 INJECTION INTRAVENOUS at 11:11

## 2021-07-02 RX ADMIN — PANTOPRAZOLE SODIUM 40 MG: 40 INJECTION, POWDER, FOR SOLUTION INTRAVENOUS at 11:11

## 2021-07-02 RX ADMIN — MEROPENEM 500 MG: 500 INJECTION INTRAVENOUS at 16:53

## 2021-07-02 RX ADMIN — LIDOCAINE 1 PATCH: 246 PATCH TOPICAL at 11:17

## 2021-07-02 RX ADMIN — FUROSEMIDE 20 MG: 10 INJECTION, SOLUTION INTRAMUSCULAR; INTRAVENOUS at 11:15

## 2021-07-02 RX ADMIN — PANTOPRAZOLE SODIUM 40 MG: 40 INJECTION, POWDER, FOR SOLUTION INTRAVENOUS at 21:25

## 2021-07-02 RX ADMIN — LYSINE, LEUCINE, PHENYLALANINE, VALINE, HISTIDINE, ISOLEUCINE, METHIONINE, THREONINE, TRYPTOPHAN, ALANINE, ARGININE, GLYCINE, PROLINE, GLUTAMIC ACID, SERINE, ASPARTIC ACID, TYROSINE
1.18; 1.04; 1.04; 960; 894; 749; 749; 749; 250; 2.17; 1.47; 1.04; 894; 749; 592; 434; 39 INJECTION, SOLUTION INTRAVENOUS at 21:28

## 2021-07-02 RX ADMIN — MORPHINE SULFATE 0.5 MG: 2 INJECTION, SOLUTION INTRAMUSCULAR; INTRAVENOUS at 12:56

## 2021-07-02 ASSESSMENT — PAIN SCALES - WONG BAKER
WONGBAKER_NUMERICALRESPONSE: 0
WONGBAKER_NUMERICALRESPONSE: 3

## 2021-07-02 ASSESSMENT — COGNITIVE AND FUNCTIONAL STATUS - GENERAL
HELP NEEDED FOR TOILETING: TOTAL
DAILY_ACTIVITY_CONVERTED_SCORE: 17.06
HELP NEEDED FOR PERSONAL GROOMING: TOTAL
TAKING CARE OF COMPLICATED TASKS: UNABLE
REMEMBERING TO TAKE MEDICATION: UNABLE
REMEMBERING WHERE THINGS ARE: A LOT
HELP NEEDED DRESSING REGULAR UPPER BODY CLOTHING: TOTAL
HELP NEEDED FOR BATHING: TOTAL
REMEMBERING 5 ERRANDS WITH NO LIST: UNABLE
DAILY_ACTIVITY_RAW_SCORE: 6
APPLIED_COGNITIVE_RAW_SCORE: 11
HELP NEEDED DRESSING REGULAR LOWER BODY CLOTHING: TOTAL
APPLIED_COGNITIVE_CONVERTED_SCORE: 27.03
UNDERSTANDING 10 TO 15 MIN SPEECH: A LITTLE
FOLLOWS FAMILIAR CONVERSATION: A LITTLE

## 2021-07-02 ASSESSMENT — ACTIVITIES OF DAILY LIVING (ADL): HOME_MANAGEMENT_TIME_ENTRY: 8

## 2021-07-02 ASSESSMENT — PAIN SCALES - GENERAL
PAINLEVEL_OUTOF10: 0
PAINLEVEL_OUTOF10: 0

## 2021-07-03 LAB
ALBUMIN SERPL-MCNC: 1.3 G/DL (ref 3.6–5.1)
ALBUMIN/GLOB SERPL: 0.3 {RATIO} (ref 1–2.4)
ALP SERPL-CCNC: 155 UNITS/L (ref 45–117)
ALT SERPL-CCNC: 63 UNITS/L
ANION GAP SERPL CALC-SCNC: 10 MMOL/L (ref 10–20)
AST SERPL-CCNC: 25 UNITS/L
BASOPHILS # BLD: 0 K/MCL (ref 0–0.3)
BASOPHILS NFR BLD: 0 %
BILIRUB SERPL-MCNC: 1.2 MG/DL (ref 0.2–1)
BUN SERPL-MCNC: 50 MG/DL (ref 6–20)
BUN/CREAT SERPL: 62 (ref 7–25)
CALCIUM SERPL-MCNC: 7.8 MG/DL (ref 8.4–10.2)
CHLORIDE SERPL-SCNC: 102 MMOL/L (ref 98–107)
CO2 SERPL-SCNC: 26 MMOL/L (ref 21–32)
CREAT SERPL-MCNC: 0.81 MG/DL (ref 0.51–0.95)
DEPRECATED RDW RBC: 52.5 FL (ref 39–50)
EOSINOPHIL # BLD: 0.1 K/MCL (ref 0–0.5)
EOSINOPHIL NFR BLD: 1 %
ERYTHROCYTE [DISTWIDTH] IN BLOOD: 16.4 % (ref 11–15)
FASTING DURATION TIME PATIENT: ABNORMAL H
GFR SERPLBLD BASED ON 1.73 SQ M-ARVRAT: 64 ML/MIN/1.73M2
GLOBULIN SER-MCNC: 4.3 G/DL (ref 2–4)
GLUCOSE BLDC GLUCOMTR-MCNC: 123 MG/DL (ref 70–99)
GLUCOSE BLDC GLUCOMTR-MCNC: 124 MG/DL (ref 70–99)
GLUCOSE BLDC GLUCOMTR-MCNC: 128 MG/DL (ref 70–99)
GLUCOSE BLDC GLUCOMTR-MCNC: 135 MG/DL (ref 70–99)
GLUCOSE SERPL-MCNC: 124 MG/DL (ref 65–99)
HCT VFR BLD CALC: 26.3 % (ref 36–46.5)
HGB BLD-MCNC: 8.4 G/DL (ref 12–15.5)
LIPASE SERPL-CCNC: 268 UNITS/L (ref 73–393)
LYMPHOCYTES # BLD: 0.4 K/MCL (ref 1–4)
LYMPHOCYTES NFR BLD: 3 %
MAGNESIUM SERPL-MCNC: 1.7 MG/DL (ref 1.7–2.4)
MCH RBC QN AUTO: 28.3 PG (ref 26–34)
MCHC RBC AUTO-ENTMCNC: 31.9 G/DL (ref 32–36.5)
MCV RBC AUTO: 88.6 FL (ref 78–100)
MONOCYTES # BLD: 0.7 K/MCL (ref 0.3–0.9)
MONOCYTES NFR BLD: 5 %
NEUTROPHILS # BLD: 12.5 K/MCL (ref 1.8–7.7)
NEUTS SEG NFR BLD: 91 %
NRBC BLD MANUAL-RTO: 0 /100 WBC
PHOSPHATE SERPL-MCNC: 3.2 MG/DL (ref 2.4–4.7)
PLAT MORPH BLD: NORMAL
PLATELET # BLD AUTO: 118 K/MCL (ref 140–450)
POTASSIUM SERPL-SCNC: 3.9 MMOL/L (ref 3.4–5.1)
PROT SERPL-MCNC: 5.6 G/DL (ref 6.4–8.2)
RBC # BLD: 2.97 MIL/MCL (ref 4–5.2)
RBC MORPH BLD: NORMAL
SODIUM SERPL-SCNC: 134 MMOL/L (ref 135–145)
WBC # BLD: 13.7 K/MCL (ref 4.2–11)
WBC MORPH BLD: ABNORMAL
WBC TOXIC VACUOLES BLD QL SMEAR: PRESENT

## 2021-07-03 PROCEDURE — 13003289 HB OXYGEN THERAPY DAILY

## 2021-07-03 PROCEDURE — 10002800 HB RX 250 W HCPCS: Performed by: INTERNAL MEDICINE

## 2021-07-03 PROCEDURE — 80053 COMPREHEN METABOLIC PANEL: CPT | Performed by: EMERGENCY MEDICINE

## 2021-07-03 PROCEDURE — 83735 ASSAY OF MAGNESIUM: CPT | Performed by: EMERGENCY MEDICINE

## 2021-07-03 PROCEDURE — 10002807 HB RX 258: Performed by: NURSE PRACTITIONER

## 2021-07-03 PROCEDURE — C9113 INJ PANTOPRAZOLE SODIUM, VIA: HCPCS | Performed by: INTERNAL MEDICINE

## 2021-07-03 PROCEDURE — 84100 ASSAY OF PHOSPHORUS: CPT | Performed by: EMERGENCY MEDICINE

## 2021-07-03 PROCEDURE — 10003585 HB ROOM CHARGE INTERMEDIATE CARE

## 2021-07-03 PROCEDURE — 10002800 HB RX 250 W HCPCS: Performed by: NURSE PRACTITIONER

## 2021-07-03 PROCEDURE — 85027 COMPLETE CBC AUTOMATED: CPT | Performed by: EMERGENCY MEDICINE

## 2021-07-03 PROCEDURE — 10002807 HB RX 258: Performed by: HOSPITALIST

## 2021-07-03 PROCEDURE — 10002807 HB RX 258: Performed by: INTERNAL MEDICINE

## 2021-07-03 PROCEDURE — 10002803 HB RX 637: Performed by: EMERGENCY MEDICINE

## 2021-07-03 PROCEDURE — 83690 ASSAY OF LIPASE: CPT | Performed by: INTERNAL MEDICINE

## 2021-07-03 RX ADMIN — MORPHINE SULFATE 2 MG: 2 INJECTION, SOLUTION INTRAMUSCULAR; INTRAVENOUS at 16:35

## 2021-07-03 RX ADMIN — ACYCLOVIR SODIUM 239 MG: 1000 INJECTION, SOLUTION INTRAVENOUS at 20:46

## 2021-07-03 RX ADMIN — PANTOPRAZOLE SODIUM 40 MG: 40 INJECTION, POWDER, FOR SOLUTION INTRAVENOUS at 08:08

## 2021-07-03 RX ADMIN — Medication: at 08:12

## 2021-07-03 RX ADMIN — PANTOPRAZOLE SODIUM 40 MG: 40 INJECTION, POWDER, FOR SOLUTION INTRAVENOUS at 20:45

## 2021-07-03 RX ADMIN — MORPHINE SULFATE 2 MG: 2 INJECTION, SOLUTION INTRAMUSCULAR; INTRAVENOUS at 21:56

## 2021-07-03 RX ADMIN — MEROPENEM 500 MG: 500 INJECTION INTRAVENOUS at 08:08

## 2021-07-03 RX ADMIN — LIDOCAINE 1 PATCH: 246 PATCH TOPICAL at 08:12

## 2021-07-03 RX ADMIN — MORPHINE SULFATE 2 MG: 2 INJECTION, SOLUTION INTRAMUSCULAR; INTRAVENOUS at 11:43

## 2021-07-03 RX ADMIN — Medication: at 20:46

## 2021-07-03 RX ADMIN — FUROSEMIDE 20 MG: 10 INJECTION, SOLUTION INTRAMUSCULAR; INTRAVENOUS at 08:11

## 2021-07-03 RX ADMIN — MEROPENEM 500 MG: 500 INJECTION INTRAVENOUS at 18:43

## 2021-07-03 RX ADMIN — AMIODARONE HYDROCHLORIDE 0.5 MG/MIN: 50 INJECTION, SOLUTION INTRAVENOUS at 20:50

## 2021-07-03 RX ADMIN — AMIODARONE HYDROCHLORIDE 0.5 MG/MIN: 50 INJECTION, SOLUTION INTRAVENOUS at 05:58

## 2021-07-03 RX ADMIN — MORPHINE SULFATE 2 MG: 2 INJECTION, SOLUTION INTRAMUSCULAR; INTRAVENOUS at 07:41

## 2021-07-03 RX ADMIN — I.V. FAT EMULSION 250 ML: 20 EMULSION INTRAVENOUS at 21:47

## 2021-07-03 RX ADMIN — LYSINE, LEUCINE, PHENYLALANINE, VALINE, HISTIDINE, ISOLEUCINE, METHIONINE, THREONINE, TRYPTOPHAN, ALANINE, ARGININE, GLYCINE, PROLINE, GLUTAMIC ACID, SERINE, ASPARTIC ACID, TYROSINE
1.18; 1.04; 1.04; 960; 894; 749; 749; 749; 250; 2.17; 1.47; 1.04; 894; 749; 592; 434; 39 INJECTION, SOLUTION INTRAVENOUS at 21:45

## 2021-07-03 RX ADMIN — ACYCLOVIR SODIUM 239 MG: 1000 INJECTION, SOLUTION INTRAVENOUS at 11:40

## 2021-07-03 RX ADMIN — MORPHINE SULFATE 2 MG: 2 INJECTION, SOLUTION INTRAMUSCULAR; INTRAVENOUS at 02:04

## 2021-07-03 RX ADMIN — MAGNESIUM SULFATE HEPTAHYDRATE 2 G: 40 INJECTION, SOLUTION INTRAVENOUS at 13:30

## 2021-07-03 ASSESSMENT — PAIN SCALES - GENERAL
PAINLEVEL_OUTOF10: 9
PAINLEVEL_OUTOF10: 0
PAINLEVEL_OUTOF10: 9

## 2021-07-03 ASSESSMENT — PAIN SCALES - WONG BAKER
WONGBAKER_NUMERICALRESPONSE: 0
WONGBAKER_NUMERICALRESPONSE: 0
WONGBAKER_NUMERICALRESPONSE: 9

## 2021-07-04 LAB
ALBUMIN SERPL-MCNC: 1.2 G/DL (ref 3.6–5.1)
ALBUMIN/GLOB SERPL: 0.3 {RATIO} (ref 1–2.4)
ALP SERPL-CCNC: 143 UNITS/L (ref 45–117)
ALT SERPL-CCNC: 46 UNITS/L
ANION GAP SERPL CALC-SCNC: 10 MMOL/L (ref 10–20)
AST SERPL-CCNC: 21 UNITS/L
BASOPHILS # BLD: 0 K/MCL (ref 0–0.3)
BASOPHILS NFR BLD: 0 %
BILIRUB SERPL-MCNC: 1.6 MG/DL (ref 0.2–1)
BUN SERPL-MCNC: 49 MG/DL (ref 6–20)
BUN/CREAT SERPL: 60 (ref 7–25)
CALCIUM SERPL-MCNC: 7.6 MG/DL (ref 8.4–10.2)
CHLORIDE SERPL-SCNC: 104 MMOL/L (ref 98–107)
CO2 SERPL-SCNC: 24 MMOL/L (ref 21–32)
CREAT SERPL-MCNC: 0.82 MG/DL (ref 0.51–0.95)
DEPRECATED RDW RBC: 51.1 FL (ref 39–50)
EOSINOPHIL # BLD: 0.1 K/MCL (ref 0–0.5)
EOSINOPHIL NFR BLD: 1 %
ERYTHROCYTE [DISTWIDTH] IN BLOOD: 16.4 % (ref 11–15)
FASTING DURATION TIME PATIENT: ABNORMAL H
GFR SERPLBLD BASED ON 1.73 SQ M-ARVRAT: 63 ML/MIN/1.73M2
GLOBULIN SER-MCNC: 4.1 G/DL (ref 2–4)
GLUCOSE BLDC GLUCOMTR-MCNC: 121 MG/DL (ref 70–99)
GLUCOSE BLDC GLUCOMTR-MCNC: 123 MG/DL (ref 70–99)
GLUCOSE BLDC GLUCOMTR-MCNC: 124 MG/DL (ref 70–99)
GLUCOSE BLDC GLUCOMTR-MCNC: 132 MG/DL (ref 70–99)
GLUCOSE BLDC GLUCOMTR-MCNC: 136 MG/DL (ref 70–99)
GLUCOSE SERPL-MCNC: 121 MG/DL (ref 65–99)
HCT VFR BLD CALC: 23.6 % (ref 36–46.5)
HGB BLD-MCNC: 7.6 G/DL (ref 12–15.5)
LYMPHOCYTES # BLD: 0.3 K/MCL (ref 1–4)
LYMPHOCYTES NFR BLD: 3 %
MAGNESIUM SERPL-MCNC: 2.3 MG/DL (ref 1.7–2.4)
MCH RBC QN AUTO: 28 PG (ref 26–34)
MCHC RBC AUTO-ENTMCNC: 32.2 G/DL (ref 32–36.5)
MCV RBC AUTO: 87.1 FL (ref 78–100)
MONOCYTES # BLD: 0.7 K/MCL (ref 0.3–0.9)
MONOCYTES NFR BLD: 6 %
NEUTROPHILS # BLD: 10 K/MCL (ref 1.8–7.7)
NEUTS BAND NFR BLD: 7 % (ref 0–10)
NEUTS SEG NFR BLD: 83 %
NRBC BLD MANUAL-RTO: 0 /100 WBC
PHOSPHATE SERPL-MCNC: 3.2 MG/DL (ref 2.4–4.7)
PLAT MORPH BLD: NORMAL
PLATELET # BLD AUTO: 116 K/MCL (ref 140–450)
POTASSIUM SERPL-SCNC: 3.8 MMOL/L (ref 3.4–5.1)
PROT SERPL-MCNC: 5.3 G/DL (ref 6.4–8.2)
RBC # BLD: 2.71 MIL/MCL (ref 4–5.2)
RBC MORPH BLD: NORMAL
SODIUM SERPL-SCNC: 134 MMOL/L (ref 135–145)
WBC # BLD: 11.1 K/MCL (ref 4.2–11)
WBC MORPH BLD: NORMAL

## 2021-07-04 PROCEDURE — 10003585 HB ROOM CHARGE INTERMEDIATE CARE

## 2021-07-04 PROCEDURE — 10002807 HB RX 258: Performed by: INTERNAL MEDICINE

## 2021-07-04 PROCEDURE — 83735 ASSAY OF MAGNESIUM: CPT | Performed by: EMERGENCY MEDICINE

## 2021-07-04 PROCEDURE — 10002800 HB RX 250 W HCPCS: Performed by: HOSPITALIST

## 2021-07-04 PROCEDURE — 10002800 HB RX 250 W HCPCS: Performed by: NURSE PRACTITIONER

## 2021-07-04 PROCEDURE — 10002807 HB RX 258: Performed by: HOSPITALIST

## 2021-07-04 PROCEDURE — 84100 ASSAY OF PHOSPHORUS: CPT | Performed by: EMERGENCY MEDICINE

## 2021-07-04 PROCEDURE — 80053 COMPREHEN METABOLIC PANEL: CPT | Performed by: EMERGENCY MEDICINE

## 2021-07-04 PROCEDURE — C9113 INJ PANTOPRAZOLE SODIUM, VIA: HCPCS | Performed by: INTERNAL MEDICINE

## 2021-07-04 PROCEDURE — 10002800 HB RX 250 W HCPCS: Performed by: INTERNAL MEDICINE

## 2021-07-04 PROCEDURE — 10002803 HB RX 637: Performed by: EMERGENCY MEDICINE

## 2021-07-04 PROCEDURE — 10002807 HB RX 258: Performed by: NURSE PRACTITIONER

## 2021-07-04 PROCEDURE — 85027 COMPLETE CBC AUTOMATED: CPT | Performed by: EMERGENCY MEDICINE

## 2021-07-04 PROCEDURE — 13003289 HB OXYGEN THERAPY DAILY

## 2021-07-04 RX ORDER — POTASSIUM CHLORIDE 14.9 MG/ML
20 INJECTION INTRAVENOUS ONCE
Status: COMPLETED | OUTPATIENT
Start: 2021-07-04 | End: 2021-07-04

## 2021-07-04 RX ADMIN — MORPHINE SULFATE 2 MG: 2 INJECTION, SOLUTION INTRAMUSCULAR; INTRAVENOUS at 13:20

## 2021-07-04 RX ADMIN — PANTOPRAZOLE SODIUM 40 MG: 40 INJECTION, POWDER, FOR SOLUTION INTRAVENOUS at 21:18

## 2021-07-04 RX ADMIN — PANTOPRAZOLE SODIUM 40 MG: 40 INJECTION, POWDER, FOR SOLUTION INTRAVENOUS at 07:51

## 2021-07-04 RX ADMIN — MORPHINE SULFATE 2 MG: 2 INJECTION, SOLUTION INTRAMUSCULAR; INTRAVENOUS at 03:07

## 2021-07-04 RX ADMIN — LYSINE, LEUCINE, PHENYLALANINE, VALINE, HISTIDINE, ISOLEUCINE, METHIONINE, THREONINE, TRYPTOPHAN, ALANINE, ARGININE, GLYCINE, PROLINE, GLUTAMIC ACID, SERINE, ASPARTIC ACID, TYROSINE
1.18; 1.04; 1.04; 960; 894; 749; 749; 749; 250; 2.17; 1.47; 1.04; 894; 749; 592; 434; 39 INJECTION, SOLUTION INTRAVENOUS at 22:47

## 2021-07-04 RX ADMIN — MORPHINE SULFATE 1 MG: 2 INJECTION, SOLUTION INTRAMUSCULAR; INTRAVENOUS at 09:19

## 2021-07-04 RX ADMIN — Medication: at 07:55

## 2021-07-04 RX ADMIN — MEROPENEM 500 MG: 500 INJECTION INTRAVENOUS at 07:53

## 2021-07-04 RX ADMIN — Medication: at 22:51

## 2021-07-04 RX ADMIN — I.V. FAT EMULSION 250 ML: 20 EMULSION INTRAVENOUS at 22:51

## 2021-07-04 RX ADMIN — LIDOCAINE 1 PATCH: 246 PATCH TOPICAL at 07:51

## 2021-07-04 RX ADMIN — ACYCLOVIR SODIUM 250 MG: 50 INJECTION, SOLUTION INTRAVENOUS at 21:29

## 2021-07-04 RX ADMIN — FUROSEMIDE 20 MG: 10 INJECTION, SOLUTION INTRAMUSCULAR; INTRAVENOUS at 07:51

## 2021-07-04 RX ADMIN — MORPHINE SULFATE 2 MG: 2 INJECTION, SOLUTION INTRAMUSCULAR; INTRAVENOUS at 06:00

## 2021-07-04 RX ADMIN — MORPHINE SULFATE 2 MG: 2 INJECTION, SOLUTION INTRAMUSCULAR; INTRAVENOUS at 18:34

## 2021-07-04 RX ADMIN — MEROPENEM 500 MG: 500 INJECTION INTRAVENOUS at 00:37

## 2021-07-04 RX ADMIN — MEROPENEM 500 MG: 500 INJECTION INTRAVENOUS at 15:41

## 2021-07-04 RX ADMIN — MORPHINE SULFATE 2 MG: 2 INJECTION, SOLUTION INTRAMUSCULAR; INTRAVENOUS at 21:21

## 2021-07-04 RX ADMIN — AMIODARONE HYDROCHLORIDE 0.5 MG/MIN: 50 INJECTION, SOLUTION INTRAVENOUS at 11:23

## 2021-07-04 RX ADMIN — POTASSIUM CHLORIDE 20 MEQ: 14.9 INJECTION, SOLUTION INTRAVENOUS at 11:22

## 2021-07-04 RX ADMIN — MEROPENEM 500 MG: 500 INJECTION INTRAVENOUS at 23:56

## 2021-07-04 RX ADMIN — ACYCLOVIR SODIUM 239 MG: 500 INJECTION, SOLUTION INTRAVENOUS at 09:19

## 2021-07-04 ASSESSMENT — PAIN SCALES - GENERAL
PAINLEVEL_OUTOF10: 8
PAINLEVEL_OUTOF10: 5
PAINLEVEL_OUTOF10: 4
PAINLEVEL_OUTOF10: 4
PAINLEVEL_OUTOF10: 5
PAINLEVEL_OUTOF10: 8
PAINLEVEL_OUTOF10: 8
PAINLEVEL_OUTOF10: 9
PAINLEVEL_OUTOF10: 7
PAINLEVEL_OUTOF10: 8

## 2021-07-04 ASSESSMENT — MOVEMENT AND STRENGTH ASSESSMENTS
RUE_ASSESSMENT: GOOD/COMPLETE ROM AGAINST GRAVITY, SOME ADDED RESISTANCE
LLE_ASSESSMENT: FAIR/COMPLETE ROM AGAINST GRAVITY, NO ADDED RESISTANCE
RLE_ASSESSMENT: FAIR/COMPLETE ROM AGAINST GRAVITY, NO ADDED RESISTANCE
LUE_ASSESSMENT: GOOD/COMPLETE ROM AGAINST GRAVITY, SOME ADDED RESISTANCE

## 2021-07-05 LAB
ANION GAP SERPL CALC-SCNC: 11 MMOL/L (ref 10–20)
BACTERIA BLD CULT: NORMAL
BACTERIA BLD CULT: NORMAL
BUN SERPL-MCNC: 48 MG/DL (ref 6–20)
BUN/CREAT SERPL: 58 (ref 7–25)
CALCIUM SERPL-MCNC: 7.7 MG/DL (ref 8.4–10.2)
CHLORIDE SERPL-SCNC: 103 MMOL/L (ref 98–107)
CO2 SERPL-SCNC: 25 MMOL/L (ref 21–32)
CREAT SERPL-MCNC: 0.83 MG/DL (ref 0.51–0.95)
DEPRECATED RDW RBC: 50.8 FL (ref 39–50)
ERYTHROCYTE [DISTWIDTH] IN BLOOD: 16.5 % (ref 11–15)
FASTING DURATION TIME PATIENT: ABNORMAL H
FASTING DURATION TIME PATIENT: NORMAL H
GFR SERPLBLD BASED ON 1.73 SQ M-ARVRAT: 62 ML/MIN/1.73M2
GLUCOSE BLDC GLUCOMTR-MCNC: 122 MG/DL (ref 70–99)
GLUCOSE BLDC GLUCOMTR-MCNC: 123 MG/DL (ref 70–99)
GLUCOSE BLDC GLUCOMTR-MCNC: 153 MG/DL (ref 70–99)
GLUCOSE SERPL-MCNC: 118 MG/DL (ref 65–99)
HCT VFR BLD CALC: 24.9 % (ref 36–46.5)
HGB BLD-MCNC: 8.1 G/DL (ref 12–15.5)
MAGNESIUM SERPL-MCNC: 2.1 MG/DL (ref 1.7–2.4)
MCH RBC QN AUTO: 28.2 PG (ref 26–34)
MCHC RBC AUTO-ENTMCNC: 32.5 G/DL (ref 32–36.5)
MCV RBC AUTO: 86.8 FL (ref 78–100)
NRBC BLD MANUAL-RTO: 0 /100 WBC
PHOSPHATE SERPL-MCNC: 3.4 MG/DL (ref 2.4–4.7)
PLATELET # BLD AUTO: 151 K/MCL (ref 140–450)
POTASSIUM SERPL-SCNC: 3.7 MMOL/L (ref 3.4–5.1)
RBC # BLD: 2.87 MIL/MCL (ref 4–5.2)
SODIUM SERPL-SCNC: 135 MMOL/L (ref 135–145)
TRIGL SERPL-MCNC: 81 MG/DL
WBC # BLD: 12.4 K/MCL (ref 4.2–11)

## 2021-07-05 PROCEDURE — 84100 ASSAY OF PHOSPHORUS: CPT

## 2021-07-05 PROCEDURE — 10002800 HB RX 250 W HCPCS: Performed by: INTERNAL MEDICINE

## 2021-07-05 PROCEDURE — 10002807 HB RX 258: Performed by: HOSPITALIST

## 2021-07-05 PROCEDURE — 10002803 HB RX 637: Performed by: EMERGENCY MEDICINE

## 2021-07-05 PROCEDURE — 10003585 HB ROOM CHARGE INTERMEDIATE CARE

## 2021-07-05 PROCEDURE — 13003289 HB OXYGEN THERAPY DAILY

## 2021-07-05 PROCEDURE — 85027 COMPLETE CBC AUTOMATED: CPT | Performed by: HOSPITALIST

## 2021-07-05 PROCEDURE — 10002801 HB RX 250 W/O HCPCS: Performed by: HOSPITALIST

## 2021-07-05 PROCEDURE — 80048 BASIC METABOLIC PNL TOTAL CA: CPT | Performed by: HOSPITALIST

## 2021-07-05 PROCEDURE — 10002807 HB RX 258: Performed by: INTERNAL MEDICINE

## 2021-07-05 PROCEDURE — 10002800 HB RX 250 W HCPCS: Performed by: NURSE PRACTITIONER

## 2021-07-05 PROCEDURE — 10002807 HB RX 258: Performed by: NURSE PRACTITIONER

## 2021-07-05 PROCEDURE — C9113 INJ PANTOPRAZOLE SODIUM, VIA: HCPCS | Performed by: INTERNAL MEDICINE

## 2021-07-05 PROCEDURE — 84478 ASSAY OF TRIGLYCERIDES: CPT | Performed by: INTERNAL MEDICINE

## 2021-07-05 PROCEDURE — 83735 ASSAY OF MAGNESIUM: CPT

## 2021-07-05 RX ORDER — POTASSIUM CHLORIDE 29.8 MG/ML
40 INJECTION INTRAVENOUS ONCE
Status: COMPLETED | OUTPATIENT
Start: 2021-07-05 | End: 2021-07-05

## 2021-07-05 RX ADMIN — LYSINE, LEUCINE, PHENYLALANINE, VALINE, HISTIDINE, ISOLEUCINE, METHIONINE, THREONINE, TRYPTOPHAN, ALANINE, ARGININE, GLYCINE, PROLINE, GLUTAMIC ACID, SERINE, ASPARTIC ACID, TYROSINE
1.18; 1.04; 1.04; 960; 894; 749; 749; 749; 250; 2.17; 1.47; 1.04; 894; 749; 592; 434; 39 INJECTION, SOLUTION INTRAVENOUS at 22:51

## 2021-07-05 RX ADMIN — INSULIN HUMAN 2 UNITS: 100 INJECTION, SOLUTION PARENTERAL at 12:07

## 2021-07-05 RX ADMIN — MEROPENEM 500 MG: 500 INJECTION INTRAVENOUS at 10:15

## 2021-07-05 RX ADMIN — PANTOPRAZOLE SODIUM 40 MG: 40 INJECTION, POWDER, FOR SOLUTION INTRAVENOUS at 10:04

## 2021-07-05 RX ADMIN — I.V. FAT EMULSION 250 ML: 20 EMULSION INTRAVENOUS at 22:54

## 2021-07-05 RX ADMIN — ACYCLOVIR SODIUM 250 MG: 50 INJECTION, SOLUTION INTRAVENOUS at 10:52

## 2021-07-05 RX ADMIN — POTASSIUM CHLORIDE 40 MEQ: 29.8 INJECTION, SOLUTION INTRAVENOUS at 11:59

## 2021-07-05 RX ADMIN — AMIODARONE HYDROCHLORIDE 0.5 MG/MIN: 50 INJECTION, SOLUTION INTRAVENOUS at 03:21

## 2021-07-05 RX ADMIN — AMIODARONE HYDROCHLORIDE 0.5 MG/MIN: 50 INJECTION, SOLUTION INTRAVENOUS at 20:22

## 2021-07-05 RX ADMIN — PANTOPRAZOLE SODIUM 40 MG: 40 INJECTION, POWDER, FOR SOLUTION INTRAVENOUS at 20:52

## 2021-07-05 RX ADMIN — Medication: at 10:06

## 2021-07-05 RX ADMIN — MORPHINE SULFATE 2 MG: 2 INJECTION, SOLUTION INTRAMUSCULAR; INTRAVENOUS at 12:08

## 2021-07-05 RX ADMIN — Medication: at 20:53

## 2021-07-05 RX ADMIN — MORPHINE SULFATE 2 MG: 2 INJECTION, SOLUTION INTRAMUSCULAR; INTRAVENOUS at 10:04

## 2021-07-05 RX ADMIN — ACYCLOVIR SODIUM 250 MG: 50 INJECTION, SOLUTION INTRAVENOUS at 20:51

## 2021-07-05 RX ADMIN — MEROPENEM 500 MG: 500 INJECTION INTRAVENOUS at 18:12

## 2021-07-05 RX ADMIN — MORPHINE SULFATE 2 MG: 2 INJECTION, SOLUTION INTRAMUSCULAR; INTRAVENOUS at 01:57

## 2021-07-05 RX ADMIN — LIDOCAINE 1 PATCH: 246 PATCH TOPICAL at 14:00

## 2021-07-05 RX ADMIN — FUROSEMIDE 20 MG: 10 INJECTION, SOLUTION INTRAMUSCULAR; INTRAVENOUS at 10:04

## 2021-07-05 ASSESSMENT — PAIN SCALES - GENERAL
PAINLEVEL_OUTOF10: 0
PAINLEVEL_OUTOF10: 10
PAINLEVEL_OUTOF10: 8
PAINLEVEL_OUTOF10: 9
PAINLEVEL_OUTOF10: 10

## 2021-07-05 ASSESSMENT — MOVEMENT AND STRENGTH ASSESSMENTS
LUE_ASSESSMENT: GOOD/COMPLETE ROM AGAINST GRAVITY, SOME ADDED RESISTANCE
RLE_ASSESSMENT: FAIR/COMPLETE ROM AGAINST GRAVITY, NO ADDED RESISTANCE
LLE_ASSESSMENT: FAIR/COMPLETE ROM AGAINST GRAVITY, NO ADDED RESISTANCE
RUE_ASSESSMENT: GOOD/COMPLETE ROM AGAINST GRAVITY, SOME ADDED RESISTANCE

## 2021-07-06 LAB
ALBUMIN SERPL-MCNC: 1.2 G/DL (ref 3.6–5.1)
ANION GAP SERPL CALC-SCNC: 11 MMOL/L (ref 10–20)
BUN SERPL-MCNC: 47 MG/DL (ref 6–20)
BUN/CREAT SERPL: 52 (ref 7–25)
CALCIUM SERPL-MCNC: 7.2 MG/DL (ref 8.4–10.2)
CHLORIDE SERPL-SCNC: 103 MMOL/L (ref 98–107)
CO2 SERPL-SCNC: 25 MMOL/L (ref 21–32)
CREAT SERPL-MCNC: 0.9 MG/DL (ref 0.51–0.95)
DEPRECATED RDW RBC: 53.9 FL (ref 39–50)
ERYTHROCYTE [DISTWIDTH] IN BLOOD: 16.7 % (ref 11–15)
FASTING DURATION TIME PATIENT: ABNORMAL H
GFR SERPLBLD BASED ON 1.73 SQ M-ARVRAT: 56 ML/MIN/1.73M2
GLUCOSE BLDC GLUCOMTR-MCNC: 126 MG/DL (ref 70–99)
GLUCOSE BLDC GLUCOMTR-MCNC: 133 MG/DL (ref 70–99)
GLUCOSE BLDC GLUCOMTR-MCNC: 136 MG/DL (ref 70–99)
GLUCOSE BLDC GLUCOMTR-MCNC: 136 MG/DL (ref 70–99)
GLUCOSE SERPL-MCNC: 207 MG/DL (ref 65–99)
HCT VFR BLD CALC: 23.3 % (ref 36–46.5)
HGB BLD-MCNC: 7.7 G/DL (ref 12–15.5)
MAGNESIUM SERPL-MCNC: 2 MG/DL (ref 1.7–2.4)
MCH RBC QN AUTO: 29.5 PG (ref 26–34)
MCHC RBC AUTO-ENTMCNC: 33 G/DL (ref 32–36.5)
MCV RBC AUTO: 89.3 FL (ref 78–100)
NRBC BLD MANUAL-RTO: 0 /100 WBC
PHOSPHATE SERPL-MCNC: 3.7 MG/DL (ref 2.4–4.7)
PLATELET # BLD AUTO: 164 K/MCL (ref 140–450)
POTASSIUM SERPL-SCNC: 4.2 MMOL/L (ref 3.4–5.1)
RBC # BLD: 2.61 MIL/MCL (ref 4–5.2)
SODIUM SERPL-SCNC: 135 MMOL/L (ref 135–145)
WBC # BLD: 9.3 K/MCL (ref 4.2–11)

## 2021-07-06 PROCEDURE — 10002800 HB RX 250 W HCPCS: Performed by: HOSPITALIST

## 2021-07-06 PROCEDURE — 82040 ASSAY OF SERUM ALBUMIN: CPT | Performed by: INTERNAL MEDICINE

## 2021-07-06 PROCEDURE — 10002800 HB RX 250 W HCPCS: Performed by: NURSE PRACTITIONER

## 2021-07-06 PROCEDURE — 10003585 HB ROOM CHARGE INTERMEDIATE CARE

## 2021-07-06 PROCEDURE — 10002800 HB RX 250 W HCPCS: Performed by: INTERNAL MEDICINE

## 2021-07-06 PROCEDURE — 10002807 HB RX 258: Performed by: INTERNAL MEDICINE

## 2021-07-06 PROCEDURE — 10002807 HB RX 258: Performed by: NURSE PRACTITIONER

## 2021-07-06 PROCEDURE — 83735 ASSAY OF MAGNESIUM: CPT | Performed by: HOSPITALIST

## 2021-07-06 PROCEDURE — 85027 COMPLETE CBC AUTOMATED: CPT | Performed by: HOSPITALIST

## 2021-07-06 PROCEDURE — 84100 ASSAY OF PHOSPHORUS: CPT | Performed by: HOSPITALIST

## 2021-07-06 PROCEDURE — 13003289 HB OXYGEN THERAPY DAILY

## 2021-07-06 PROCEDURE — 10002807 HB RX 258: Performed by: HOSPITALIST

## 2021-07-06 PROCEDURE — C9113 INJ PANTOPRAZOLE SODIUM, VIA: HCPCS | Performed by: INTERNAL MEDICINE

## 2021-07-06 PROCEDURE — 10002803 HB RX 637: Performed by: EMERGENCY MEDICINE

## 2021-07-06 PROCEDURE — 10002803 HB RX 637: Performed by: INTERNAL MEDICINE

## 2021-07-06 PROCEDURE — 80048 BASIC METABOLIC PNL TOTAL CA: CPT | Performed by: HOSPITALIST

## 2021-07-06 RX ORDER — HEPARIN SODIUM 5000 [USP'U]/ML
5000 INJECTION, SOLUTION INTRAVENOUS; SUBCUTANEOUS EVERY 8 HOURS SCHEDULED
Status: DISCONTINUED | OUTPATIENT
Start: 2021-07-06 | End: 2021-07-08

## 2021-07-06 RX ORDER — SCOLOPAMINE TRANSDERMAL SYSTEM 1 MG/1
1 PATCH, EXTENDED RELEASE TRANSDERMAL
Status: DISCONTINUED | OUTPATIENT
Start: 2021-07-06 | End: 2021-07-08 | Stop reason: HOSPADM

## 2021-07-06 RX ADMIN — MORPHINE SULFATE 2 MG: 2 INJECTION, SOLUTION INTRAMUSCULAR; INTRAVENOUS at 13:16

## 2021-07-06 RX ADMIN — AMIODARONE HYDROCHLORIDE 0.5 MG/MIN: 50 INJECTION, SOLUTION INTRAVENOUS at 14:52

## 2021-07-06 RX ADMIN — LIDOCAINE 1 PATCH: 246 PATCH TOPICAL at 13:18

## 2021-07-06 RX ADMIN — MEROPENEM 500 MG: 500 INJECTION INTRAVENOUS at 01:09

## 2021-07-06 RX ADMIN — SCOLOPAMINE TRANSDERMAL SYSTEM 1 PATCH: 1 PATCH, EXTENDED RELEASE TRANSDERMAL at 01:35

## 2021-07-06 RX ADMIN — PANTOPRAZOLE SODIUM 40 MG: 40 INJECTION, POWDER, FOR SOLUTION INTRAVENOUS at 21:55

## 2021-07-06 RX ADMIN — MORPHINE SULFATE 2 MG: 2 INJECTION, SOLUTION INTRAMUSCULAR; INTRAVENOUS at 16:57

## 2021-07-06 RX ADMIN — FUROSEMIDE 20 MG: 10 INJECTION, SOLUTION INTRAMUSCULAR; INTRAVENOUS at 09:38

## 2021-07-06 RX ADMIN — MORPHINE SULFATE 2 MG: 2 INJECTION, SOLUTION INTRAMUSCULAR; INTRAVENOUS at 10:05

## 2021-07-06 RX ADMIN — MEROPENEM 500 MG: 500 INJECTION INTRAVENOUS at 18:39

## 2021-07-06 RX ADMIN — ACYCLOVIR SODIUM 250 MG: 50 INJECTION, SOLUTION INTRAVENOUS at 09:52

## 2021-07-06 RX ADMIN — I.V. FAT EMULSION 250 ML: 20 EMULSION INTRAVENOUS at 23:11

## 2021-07-06 RX ADMIN — PANTOPRAZOLE SODIUM 40 MG: 40 INJECTION, POWDER, FOR SOLUTION INTRAVENOUS at 09:38

## 2021-07-06 RX ADMIN — MEROPENEM 500 MG: 500 INJECTION INTRAVENOUS at 11:03

## 2021-07-06 RX ADMIN — MORPHINE SULFATE 2 MG: 2 INJECTION, SOLUTION INTRAMUSCULAR; INTRAVENOUS at 01:03

## 2021-07-06 RX ADMIN — HEPARIN SODIUM 5000 UNITS: 5000 INJECTION INTRAVENOUS; SUBCUTANEOUS at 13:33

## 2021-07-06 RX ADMIN — HEPARIN SODIUM 5000 UNITS: 5000 INJECTION INTRAVENOUS; SUBCUTANEOUS at 21:58

## 2021-07-06 RX ADMIN — Medication: at 09:38

## 2021-07-06 RX ADMIN — ACYCLOVIR SODIUM 250 MG: 50 INJECTION, SOLUTION INTRAVENOUS at 21:55

## 2021-07-06 RX ADMIN — LYSINE, LEUCINE, PHENYLALANINE, VALINE, HISTIDINE, ISOLEUCINE, METHIONINE, THREONINE, TRYPTOPHAN, ALANINE, ARGININE, GLYCINE, PROLINE, GLUTAMIC ACID, SERINE, ASPARTIC ACID, TYROSINE
1.18; 1.04; 1.04; 960; 894; 749; 749; 749; 250; 2.17; 1.47; 1.04; 894; 749; 592; 434; 39 INJECTION, SOLUTION INTRAVENOUS at 23:07

## 2021-07-06 ASSESSMENT — PAIN SCALES - GENERAL
PAINLEVEL_OUTOF10: 10
PAINLEVEL_OUTOF10: 0
PAINLEVEL_OUTOF10: 0
PAINLEVEL_OUTOF10: 4
PAINLEVEL_OUTOF10: 10
PAINLEVEL_OUTOF10: 8
PAINLEVEL_OUTOF10: 10
PAINLEVEL_OUTOF10: 8
PAINLEVEL_OUTOF10: 0
PAINLEVEL_OUTOF10: 8

## 2021-07-06 ASSESSMENT — MOVEMENT AND STRENGTH ASSESSMENTS
LLE_ASSESSMENT: POOR/COMPLETE ROM WITH GRAVITY ELIMINATED
RUE_ASSESSMENT: GOOD/COMPLETE ROM AGAINST GRAVITY, SOME ADDED RESISTANCE
LUE_ASSESSMENT: GOOD/COMPLETE ROM AGAINST GRAVITY, SOME ADDED RESISTANCE
RLE_ASSESSMENT: POOR/COMPLETE ROM WITH GRAVITY ELIMINATED

## 2021-07-06 ASSESSMENT — PAIN SCALES - WONG BAKER: WONGBAKER_NUMERICALRESPONSE: 0

## 2021-07-07 LAB
ANION GAP SERPL CALC-SCNC: 11 MMOL/L (ref 10–20)
BUN SERPL-MCNC: 46 MG/DL (ref 6–20)
BUN/CREAT SERPL: 53 (ref 7–25)
CALCIUM SERPL-MCNC: 7.8 MG/DL (ref 8.4–10.2)
CHLORIDE SERPL-SCNC: 105 MMOL/L (ref 98–107)
CO2 SERPL-SCNC: 24 MMOL/L (ref 21–32)
CREAT SERPL-MCNC: 0.86 MG/DL (ref 0.51–0.95)
DEPRECATED RDW RBC: 53.9 FL (ref 39–50)
ERYTHROCYTE [DISTWIDTH] IN BLOOD: 17 % (ref 11–15)
FASTING DURATION TIME PATIENT: ABNORMAL H
GFR SERPLBLD BASED ON 1.73 SQ M-ARVRAT: 59 ML/MIN/1.73M2
GLUCOSE BLDC GLUCOMTR-MCNC: 115 MG/DL (ref 70–99)
GLUCOSE BLDC GLUCOMTR-MCNC: 125 MG/DL (ref 70–99)
GLUCOSE BLDC GLUCOMTR-MCNC: 132 MG/DL (ref 70–99)
GLUCOSE BLDC GLUCOMTR-MCNC: 136 MG/DL (ref 70–99)
GLUCOSE BLDC GLUCOMTR-MCNC: 136 MG/DL (ref 70–99)
GLUCOSE SERPL-MCNC: 120 MG/DL (ref 65–99)
HCT VFR BLD CALC: 24.5 % (ref 36–46.5)
HGB BLD-MCNC: 7.8 G/DL (ref 12–15.5)
MAGNESIUM SERPL-MCNC: 2 MG/DL (ref 1.7–2.4)
MCH RBC QN AUTO: 28.6 PG (ref 26–34)
MCHC RBC AUTO-ENTMCNC: 31.8 G/DL (ref 32–36.5)
MCV RBC AUTO: 89.7 FL (ref 78–100)
NRBC BLD MANUAL-RTO: 0 /100 WBC
PHOSPHATE SERPL-MCNC: 3.5 MG/DL (ref 2.4–4.7)
PLATELET # BLD AUTO: 200 K/MCL (ref 140–450)
POTASSIUM SERPL-SCNC: 3.8 MMOL/L (ref 3.4–5.1)
RBC # BLD: 2.73 MIL/MCL (ref 4–5.2)
SODIUM SERPL-SCNC: 136 MMOL/L (ref 135–145)
WBC # BLD: 9.6 K/MCL (ref 4.2–11)

## 2021-07-07 PROCEDURE — 10002800 HB RX 250 W HCPCS: Performed by: INTERNAL MEDICINE

## 2021-07-07 PROCEDURE — 10003585 HB ROOM CHARGE INTERMEDIATE CARE

## 2021-07-07 PROCEDURE — 80048 BASIC METABOLIC PNL TOTAL CA: CPT | Performed by: HOSPITALIST

## 2021-07-07 PROCEDURE — 10002807 HB RX 258: Performed by: INTERNAL MEDICINE

## 2021-07-07 PROCEDURE — C9113 INJ PANTOPRAZOLE SODIUM, VIA: HCPCS | Performed by: INTERNAL MEDICINE

## 2021-07-07 PROCEDURE — 13003289 HB OXYGEN THERAPY DAILY

## 2021-07-07 PROCEDURE — 10002800 HB RX 250 W HCPCS: Performed by: HOSPITALIST

## 2021-07-07 PROCEDURE — 83735 ASSAY OF MAGNESIUM: CPT | Performed by: HOSPITALIST

## 2021-07-07 PROCEDURE — 10002803 HB RX 637: Performed by: EMERGENCY MEDICINE

## 2021-07-07 PROCEDURE — 10002807 HB RX 258: Performed by: NURSE PRACTITIONER

## 2021-07-07 PROCEDURE — 10002800 HB RX 250 W HCPCS: Performed by: NURSE PRACTITIONER

## 2021-07-07 PROCEDURE — 85027 COMPLETE CBC AUTOMATED: CPT | Performed by: HOSPITALIST

## 2021-07-07 PROCEDURE — 84100 ASSAY OF PHOSPHORUS: CPT | Performed by: HOSPITALIST

## 2021-07-07 RX ADMIN — ACYCLOVIR SODIUM 250 MG: 50 INJECTION, SOLUTION INTRAVENOUS at 21:09

## 2021-07-07 RX ADMIN — AMIODARONE HYDROCHLORIDE 0.5 MG/MIN: 50 INJECTION, SOLUTION INTRAVENOUS at 21:09

## 2021-07-07 RX ADMIN — I.V. FAT EMULSION 250 ML: 20 EMULSION INTRAVENOUS at 21:15

## 2021-07-07 RX ADMIN — LIDOCAINE 1 PATCH: 246 PATCH TOPICAL at 08:52

## 2021-07-07 RX ADMIN — MEROPENEM 500 MG: 500 INJECTION INTRAVENOUS at 18:20

## 2021-07-07 RX ADMIN — PANTOPRAZOLE SODIUM 40 MG: 40 INJECTION, POWDER, FOR SOLUTION INTRAVENOUS at 08:50

## 2021-07-07 RX ADMIN — ACYCLOVIR SODIUM 250 MG: 50 INJECTION, SOLUTION INTRAVENOUS at 08:49

## 2021-07-07 RX ADMIN — MEROPENEM 500 MG: 500 INJECTION INTRAVENOUS at 11:02

## 2021-07-07 RX ADMIN — HEPARIN SODIUM 5000 UNITS: 5000 INJECTION INTRAVENOUS; SUBCUTANEOUS at 06:24

## 2021-07-07 RX ADMIN — Medication: at 08:49

## 2021-07-07 RX ADMIN — MEROPENEM 500 MG: 500 INJECTION INTRAVENOUS at 02:15

## 2021-07-07 RX ADMIN — PANTOPRAZOLE SODIUM 40 MG: 40 INJECTION, POWDER, FOR SOLUTION INTRAVENOUS at 21:21

## 2021-07-07 RX ADMIN — HEPARIN SODIUM 5000 UNITS: 5000 INJECTION INTRAVENOUS; SUBCUTANEOUS at 21:21

## 2021-07-07 RX ADMIN — MORPHINE SULFATE 2 MG: 2 INJECTION, SOLUTION INTRAMUSCULAR; INTRAVENOUS at 10:10

## 2021-07-07 RX ADMIN — FUROSEMIDE 20 MG: 10 INJECTION, SOLUTION INTRAMUSCULAR; INTRAVENOUS at 08:49

## 2021-07-07 RX ADMIN — HEPARIN SODIUM 5000 UNITS: 5000 INJECTION INTRAVENOUS; SUBCUTANEOUS at 15:00

## 2021-07-07 RX ADMIN — AMIODARONE HYDROCHLORIDE 0.5 MG/MIN: 50 INJECTION, SOLUTION INTRAVENOUS at 04:25

## 2021-07-07 RX ADMIN — LYSINE, LEUCINE, PHENYLALANINE, VALINE, HISTIDINE, ISOLEUCINE, METHIONINE, THREONINE, TRYPTOPHAN, ALANINE, ARGININE, GLYCINE, PROLINE, GLUTAMIC ACID, SERINE, ASPARTIC ACID, TYROSINE
1.18; 1.04; 1.04; 960; 894; 749; 749; 749; 250; 2.17; 1.47; 1.04; 894; 749; 592; 434; 39 INJECTION, SOLUTION INTRAVENOUS at 21:13

## 2021-07-07 RX ADMIN — MORPHINE SULFATE 2 MG: 2 INJECTION, SOLUTION INTRAMUSCULAR; INTRAVENOUS at 01:12

## 2021-07-07 RX ADMIN — MORPHINE SULFATE 2 MG: 2 INJECTION, SOLUTION INTRAMUSCULAR; INTRAVENOUS at 17:00

## 2021-07-07 RX ADMIN — Medication: at 21:16

## 2021-07-07 ASSESSMENT — PAIN SCALES - GENERAL
PAINLEVEL_OUTOF10: 7
PAINLEVEL_OUTOF10: 0

## 2021-07-07 ASSESSMENT — PAIN SCALES - WONG BAKER
WONGBAKER_NUMERICALRESPONSE: 0
WONGBAKER_NUMERICALRESPONSE: 0

## 2021-07-08 VITALS
HEART RATE: 84 BPM | BODY MASS INDEX: 26.22 KG/M2 | RESPIRATION RATE: 31 BRPM | TEMPERATURE: 99 F | OXYGEN SATURATION: 95 % | HEIGHT: 61 IN | WEIGHT: 138.89 LBS | SYSTOLIC BLOOD PRESSURE: 122 MMHG | DIASTOLIC BLOOD PRESSURE: 60 MMHG

## 2021-07-08 DIAGNOSIS — I35.0 NONRHEUMATIC AORTIC (VALVE) STENOSIS: Primary | ICD-10-CM

## 2021-07-08 DIAGNOSIS — Z95.2 S/P TAVR (TRANSCATHETER AORTIC VALVE REPLACEMENT): ICD-10-CM

## 2021-07-08 LAB
ANION GAP SERPL CALC-SCNC: 10 MMOL/L (ref 10–20)
BUN SERPL-MCNC: 46 MG/DL (ref 6–20)
BUN/CREAT SERPL: 56 (ref 7–25)
CALCIUM SERPL-MCNC: 7.7 MG/DL (ref 8.4–10.2)
CHLORIDE SERPL-SCNC: 104 MMOL/L (ref 98–107)
CO2 SERPL-SCNC: 24 MMOL/L (ref 21–32)
CREAT SERPL-MCNC: 0.82 MG/DL (ref 0.51–0.95)
DEPRECATED RDW RBC: 55.1 FL (ref 39–50)
ERYTHROCYTE [DISTWIDTH] IN BLOOD: 17.2 % (ref 11–15)
FASTING DURATION TIME PATIENT: ABNORMAL H
GFR SERPLBLD BASED ON 1.73 SQ M-ARVRAT: 63 ML/MIN/1.73M2
GLUCOSE BLDC GLUCOMTR-MCNC: 130 MG/DL (ref 70–99)
GLUCOSE BLDC GLUCOMTR-MCNC: 132 MG/DL (ref 70–99)
GLUCOSE SERPL-MCNC: 121 MG/DL (ref 65–99)
HCT VFR BLD CALC: 31.4 % (ref 36–46.5)
HGB BLD-MCNC: 10.1 G/DL (ref 12–15.5)
MAGNESIUM SERPL-MCNC: 1.9 MG/DL (ref 1.7–2.4)
MCH RBC QN AUTO: 28.5 PG (ref 26–34)
MCHC RBC AUTO-ENTMCNC: 32.2 G/DL (ref 32–36.5)
MCV RBC AUTO: 88.5 FL (ref 78–100)
NRBC BLD MANUAL-RTO: 0 /100 WBC
PHOSPHATE SERPL-MCNC: 3.4 MG/DL (ref 2.4–4.7)
PLATELET # BLD AUTO: 165 K/MCL (ref 140–450)
POTASSIUM SERPL-SCNC: 3.4 MMOL/L (ref 3.4–5.1)
RBC # BLD: 3.55 MIL/MCL (ref 4–5.2)
SODIUM SERPL-SCNC: 135 MMOL/L (ref 135–145)
WBC # BLD: 8.3 K/MCL (ref 4.2–11)

## 2021-07-08 PROCEDURE — 80048 BASIC METABOLIC PNL TOTAL CA: CPT | Performed by: HOSPITALIST

## 2021-07-08 PROCEDURE — 10002800 HB RX 250 W HCPCS: Performed by: NURSE PRACTITIONER

## 2021-07-08 PROCEDURE — 10002803 HB RX 637: Performed by: EMERGENCY MEDICINE

## 2021-07-08 PROCEDURE — 10002800 HB RX 250 W HCPCS: Performed by: INTERNAL MEDICINE

## 2021-07-08 PROCEDURE — 10002807 HB RX 258: Performed by: NURSE PRACTITIONER

## 2021-07-08 PROCEDURE — 84100 ASSAY OF PHOSPHORUS: CPT | Performed by: INTERNAL MEDICINE

## 2021-07-08 PROCEDURE — 85027 COMPLETE CBC AUTOMATED: CPT | Performed by: HOSPITALIST

## 2021-07-08 PROCEDURE — 10002807 HB RX 258: Performed by: INTERNAL MEDICINE

## 2021-07-08 PROCEDURE — 83735 ASSAY OF MAGNESIUM: CPT | Performed by: INTERNAL MEDICINE

## 2021-07-08 PROCEDURE — 10002800 HB RX 250 W HCPCS: Performed by: HOSPITALIST

## 2021-07-08 PROCEDURE — C9113 INJ PANTOPRAZOLE SODIUM, VIA: HCPCS | Performed by: INTERNAL MEDICINE

## 2021-07-08 RX ORDER — POTASSIUM CHLORIDE 29.8 MG/ML
20 INJECTION INTRAVENOUS ONCE
Status: COMPLETED | OUTPATIENT
Start: 2021-07-08 | End: 2021-07-08

## 2021-07-08 RX ADMIN — MORPHINE SULFATE 2 MG: 2 INJECTION, SOLUTION INTRAMUSCULAR; INTRAVENOUS at 16:32

## 2021-07-08 RX ADMIN — MORPHINE SULFATE 2 MG: 2 INJECTION, SOLUTION INTRAMUSCULAR; INTRAVENOUS at 10:03

## 2021-07-08 RX ADMIN — MEROPENEM 500 MG: 500 INJECTION INTRAVENOUS at 10:11

## 2021-07-08 RX ADMIN — POTASSIUM CHLORIDE 20 MEQ: 29.8 INJECTION, SOLUTION INTRAVENOUS at 11:54

## 2021-07-08 RX ADMIN — POTASSIUM CHLORIDE 20 MEQ: 29.8 INJECTION, SOLUTION INTRAVENOUS at 10:07

## 2021-07-08 RX ADMIN — Medication: at 08:13

## 2021-07-08 RX ADMIN — HEPARIN SODIUM 5000 UNITS: 5000 INJECTION INTRAVENOUS; SUBCUTANEOUS at 05:17

## 2021-07-08 RX ADMIN — ACYCLOVIR SODIUM 250 MG: 50 INJECTION, SOLUTION INTRAVENOUS at 08:13

## 2021-07-08 RX ADMIN — PANTOPRAZOLE SODIUM 40 MG: 40 INJECTION, POWDER, FOR SOLUTION INTRAVENOUS at 08:21

## 2021-07-08 RX ADMIN — MEROPENEM 500 MG: 500 INJECTION INTRAVENOUS at 03:26

## 2021-07-08 RX ADMIN — HEPARIN SODIUM 5000 UNITS: 5000 INJECTION INTRAVENOUS; SUBCUTANEOUS at 13:00

## 2021-07-08 RX ADMIN — PANTOPRAZOLE SODIUM 40 MG: 40 INJECTION, POWDER, FOR SOLUTION INTRAVENOUS at 20:17

## 2021-07-08 RX ADMIN — FUROSEMIDE 20 MG: 10 INJECTION, SOLUTION INTRAMUSCULAR; INTRAVENOUS at 08:22

## 2021-07-08 RX ADMIN — AMIODARONE HYDROCHLORIDE 0.5 MG/MIN: 50 INJECTION, SOLUTION INTRAVENOUS at 12:58

## 2021-07-08 RX ADMIN — LIDOCAINE 1 PATCH: 246 PATCH TOPICAL at 08:28

## 2021-07-08 ASSESSMENT — PAIN SCALES - GENERAL
PAINLEVEL_OUTOF10: 3
PAINLEVEL_OUTOF10: 0
PAINLEVEL_OUTOF10: 3

## 2021-07-08 ASSESSMENT — PAIN SCALES - WONG BAKER
WONGBAKER_NUMERICALRESPONSE: 3
WONGBAKER_NUMERICALRESPONSE: 3
WONGBAKER_NUMERICALRESPONSE: 0
WONGBAKER_NUMERICALRESPONSE: 0

## 2021-07-14 LAB
FUNGUS SPEC CULT: NORMAL
FUNGUS SPEC FUNGUS STN: NORMAL

## 2025-02-06 NOTE — PROGRESS NOTES
ASSESSMENT/PLAN:     Impression: 1. Atrial fibrillation, new onset in a 25-year-old female with history aortic stenosis   2. Aortic stenosis, moderate by echo in 2018. She was evaluated for TAVR in 2019 but declined. 3.  Hypertension  4.   Chronic ki Oral Cap Take 1 capsule by mouth.      • VITAMIN D OR one tab daily     • VITAMIN E one tab daily     • VITAMIN-B COMPLEX OR one tab daily        Past Medical History:   Diagnosis Date   • Aortic stenosis    • Arthritis    • Bronchitis    • Concussion    • clubbing or cyanosis. SKIN:no rashes,lesions. NEURO/PSYCH:alert and oriented. Normal affect. CV: PALP:PMI not displaced; no lifts, thrills or rubs. AUSC: Irregular rhythm, normal S1, S2 diminished A2 without S3; systolic ejection murmur.  CAROTIDS:caroti No

## (undated) DEVICE — Device

## (undated) NOTE — ED AVS SNAPSHOT
[facility] AND [facility] in [facility] [address] [address] [address]    Phone:  [phone]    Fax:  [phone]           Duane Seller   MRN: [id number]    Department:  [facility] in [place]   Date of Visit:  1 physician may seek payment directly from you for amounts other than your deductible, co-payment, or co-insurance and for other services not covered under your health insurance plan.   Please contact your insurance company and physician's office to determine different from what your Primary Care doctor has instructed you - please continue to take your medications as instructed by your Primary Care doctor until you can check with your doctor. Please bring the medication list to your next doctor's appointment. Medicaid plans. To get signed up and covered, please call [phone] and ask to get set up for an insurance coverage that is in-network with Marcy Lazaro         Aplicajudy

## (undated) NOTE — IP AVS SNAPSHOT
[facility]            (For Outpatient Use Only) Initial Admit Date: 1/27/2021   Inpt/Obs Admit Date: Inpt: N/A / Obs: 01/28/21   Discharge Date:    Hospital Acct:  [de-identified]   MRN: [de-identified]   CSN: [id number]   CEID: [id number]        EN Subscriber ID: [id number] Pt Rel to Subscriber: SPOUSE   TERTIARY INSURANCE   Payor:  Plan:    Group Number:  Insurance Type:    Subscriber Name:  Subscriber :    Subscriber ID:  Pt Rel to Subscriber:    Hospital Account Financial Class: Medicare    Austin Aguilar

## (undated) NOTE — LETTER
May 17, 2021    [address] [facility] [id number]      Dear Shelia Abernathy: The following are the results of your recent tests ordered by [facility]. Please review the list of test results.   Your result is the number on the Yours,  [address] [facility] - Cardiology Department

## (undated) NOTE — LETTER
Dear Dr. Devorah Neri    This letter is to inform you that Sindhu Ortega has been attending Physical Therapy with me. See below for my most recent plan of care.        Patient Name: Sindhu Ortega, : 1930, MRN: [id number]   Date:  2018  Refe RSB: mod restrict - some pain into L shoulder  LSB: mod restrict  RRot: WNL  LRot: WNL     Thoracic Screen: increased thoracic kyphosis     AROM:  Shoulder  Elbow   Flexion: R 105; L 95  Extension: R 53; L 49  Abduction: R 102; L 100  ER: R 82; L 44  IR: R Plan: Continue skilled Physical Therapy 2 x/week or a total of 6 visits over a 90 day period, tapering to 1x/wk as indicated.  Treatment will include: therapeutic exercise, therapeutic activity, manual interventions, neuro re-ed, posture retraining, patient

## (undated) NOTE — LETTER
Dear Dr. Nishant Neri    This letter is to inform you that Sommer Peters has been attending Physical Therapy with me. See below for my most recent plan of care.        Patient Name: Sommer Peters, : 1930, MRN: [id number]   Date:  2018  Refer L shoulder.      Objective:     Observation/Posture: fwd head, rounded shoulders, increased thoracic kyphosis, abducted scapulae     Cervical Screen:    Flex: WNL  Ext: min restrict - some pain into L shoulder  RSB: mod-sig restrict - some pain into L shoul Current status G Code: Progress/Goal status, Carrying, Moving and Handling Objects, CK: 40-59% impaired, limited, or restricted  Goal status G Code: Carrying, Moving and Handling ObjectsCJ: 20-39% impaired, limited, or restricted    Rehab Potential: good f

## (undated) NOTE — LETTER
Patient Name: Ginny Lynch  YOB: 1930          MRN number:  [id number]  Date:  3/5/2018  Referring Physician: Kartik Neri     UPPER EXTREMITY EVALUATION:    Referring Physician: Dr. Gisela Driver  Diagnosis: B shoulder pain  Date of Onset: >9mont Past medical history was reviewed with Lico.  Significant findings include HTN (controlled by meds), aortic stenosis       ASSESSMENT:    Louis Cockayne is a 80year old y/o female who presents to PT with c/o L shoulder pain that began of insidious onse 10-12 visits over a 90 day period. Treatment will include: Manual Therapy; Therapeutic Exercises; Neuromuscular Re-education; Therapeutic Activity;  Electrical Stim; Patient education; Home exercise program instruction    Education or treatment limitation:

## (undated) NOTE — LETTER
Dear Dr. Henry Neri    This letter is to inform you that Daria Edmond has been attending Physical Therapy with me. See below for my most recent plan of care.        Patient Name: Daria Edmond, : 1930, MRN: [id number]   Date:  2018  Refe Abduction: R 100; L 76 (to 83 post-tx)  ER: R 65; L 36  IR: R T11; L sacral base WNL      PROM:  Shoulder  Elbow   Flexion: R 140; L 114  Abduction: R 152; L 83  ER at 30deg abd: R 62; L 23  IR at 45deg abd: R 80; L 70 WNL      Strength/MMT:  Shoulder Elbo Charges: Re-eval x1, TherEx x1  Timed Treatment: 20  Total Treatment Time: 45    Patient was advised of these findings, precautions, and treatment options and has agreed to actively participate in planning and for this course of care.     Thank you for your

## (undated) NOTE — IP AVS SNAPSHOT
Patient Demographics     Address  [address] [place] [address] [employer] Phone  [phone] (Home) *Preferred*  [phone] [facility]) E-mail Address  [email or web address]      Emergency Contact(s)     Name Relation Home Work Mobile    Renetta Rivsa Take 100 mg by mouth 2 (two) times daily.    Jess Negron MD         HYDROcodone-acetaminophen 5-325 MG Tabs  Commonly known as: [address]  Next dose due: LAST GIVEN AT 12:30, CAN TAKE ANY TIME AFTER 4:30      Take 1 tablet by mouth every 4 (four) hours a [id number] diltiazem (CARDIZEM CD) 24 hr cap 120 mg 10/06/20 0850 Given      [id number] docusate sodium (COLACE) cap 100 mg 10/05/20 2328 Given      [id number] docusate sodium (COLACE) cap 100 mg 10/06/20 0850 Given      [id number] mirtazapine (REMERON) tab 7 MAGNESIUM [[id number]] (Normal)  Resulted: 10/06/20 0809, Result status: Final result   Ordering provider: Nguyễn Correia MD  10/05/20 2300 Resulting lab: [facility])    Specimen Information    Type Source Co Type Source Collected On   Body fluid, unspecified Synovial fluid 10/05/20 1513            CRYSTAL EXAM,MISCELLANEOUS FL [[id number]]  Resulted: 10/05/20 1742, Result status: Final result   Ordering provider: Corrie Wilson MD  10/05/20 1511 Resulting lab: Tom [address] [place] [address]) [facility] ([facility]) Henrique Brown. Shannan Renner M.D. [facility]. [address] [place] [id number] 03/19/20 1442 - Present            Microbiology Results (All)     Procedure Component Value Units Date/Time    Rapid SARS Patient is a 80year old female admitted 10/5/2020 for R knee pain due to hemarthrosis. Patient's current functional deficits include R knee pain and decreased functional mobility, which are below the patient's pre-admission status.   Prior to admission pt Rehab Potential : Fair  Frequency (Obs): 5x/week       PHYSICAL THERAPY MEDICAL/SOCIAL HISTORY     History related to current admission: R knee pain due to hemarthrosis    Problem List  Principal Problem:    Hemarthrosis, right knee      Past Medical Histo Rating: (did not rate-R knee pain incr with mobility/WBing)  Location: R knee  Management Techniques:  Activity promotion;Relaxation;Repositioning(received pain meds prior to session)    COGNITION[MD.1]  · Overall Cognitive Status:  WFL - within functional Patient End of Session: Up in chair;Needs met;Call light within reach;RN aware of session/findings; All patient questions and concerns addressed; Alarm set    CURRENT GOALS    Goals to be met by:[MD.1] 10/13/2020[MD.2]  Patient Goal Patient's self-stated goa 10/6/2020 1:00 PM Steff Jarvis, [address]    11/13/2020 1:00 PM Vipin Stevenson MD Internal [address] , [facility]    3/15/2021 11:00 AM [facility] ECHO Cardiology - [address] [place]

## (undated) NOTE — IP AVS SNAPSHOT
Patient Demographics     Address  [address] [place] [address] Phone  [phone] (Home) *Preferred*  [phone] [facility]) E-mail Address  [email or web address]      Emergency Contact(s)     Name Relation Home Work Mobile    Renetta Rivas Yvette Farfan MD         mirtazapine 7.5 MG Tabs  Commonly known as: REMERON  Next dose due: Tonight 9 pm      TAKE 1 TABLET BY MOUTH NIGHTLY   Bishop Danay MD         Multivital-M Tabs  Next dose due:  Tomorrow 9 am      Take 1 tablet by mouth da Blood — 01/29/21 0526          Components    Component Value Reference Range Flag Lab   Glucose 111 70 - 99 mg/dL [facility])   Sodium 138 136 - 145 mmol/L — [facility])   Potassium 4.2 3.5 - 5.1 mmol/L — [facility])   Chloride 105 9 Order Status: Completed Lab Status: Final result Updated: 01/27/21 2249    Specimen: Other from Nares      Rapid SARS-CoV-2 by PCR Not Detected         H&P - H&P Note      H&P signed by Alicia Riggs MD at 1/28/2021  3:59 PM  Version 4 of 4    Author Concerns regarding plan of care were discussed with patient. Patient agrees with plan as detailed above.  Discussed plan of care with [GB.1] Davi[GB.2]    Jaz Peralta RN, NP  [address] Team  Pager [phone]  Answering Eliu Ely CARDIOVASCULAR: regular,nl S1 S2  ABDOMEN:  Soft, BS+; non distended, non tender    EXTREMITIES:[GB.1] right leg with knee immobilizer and ace wrap[GB. 2]     PMH  Past Medical History:   Diagnosis Date   • Aortic stenosis    • Arrhythmia     Controlled A- •  VITAMIN D OR, one tab daily, Disp: , Rfl:     •  VITAMIN E, one tab daily, Disp: , Rfl:     •  VITAMIN-B COMPLEX OR, one tab daily, Disp: , Rfl:         Soc Hx  Social History    Tobacco Use      Smoking status: Never Smoker      Smokeless tobacco: Cindy Will Patient examined and assessed independently. Agree with above APN assessment. [GV.1]    Ms. Cedric Ag is a 81 yo F with PMH of Afib on Eliquis, LE edema, CKD stage 3, HTN,[GB.1] OA shoulder, severe[GB. 2] aortic stenosis and hemiarthrosis of right knee who prese Samantha Triplett MD  [facility] hospitalist[GV.1]      Electronically signed by Rosey Villegas MD on 1/28/2021  3:59 PM   Attribution [employer]    GB. 1 - Steph Calvo NP on 1/28/2021  7:50 AM  GB. 2 - Steph Calvo NP on 1/28/2021  9:28 AM  GB. 3 - [facility] N, G -continue remeron     GO  - CODE- DNR/select confirmed with patient[GB.1]  -POA   Per pt[GB. 2]     FEN  -lytes in am  -diet-general     Prophy  -SCD  -holding AC    Dispo  -pending clinical course  PCP: Huan Gaytan MD      Concerns regarding GENERAL: no apparent distress  NEUROLOGIC: A/A; Ox3: strength normal; sensations intact  SKIN: no rashes  HEENT: normocephalic, normal nose, pharynx and TM's, sclera anicteric, conjunctiva normal  NECK: supple  RESPIRATORY: normal expansion; non labored, C •  Multiple Vitamins-Minerals (MULTIVITAL-M) Oral Tab, Take 1 tablet by mouth daily. , Disp: , Rfl:     •  Calcium Carbonate-Vitamin D (CALCIUM-VITAMIN D) 500-200 MG-UNIT Oral Tab, Take 1 tablet by mouth daily. , Disp: , Rfl:     •  Omega 3 1000 MG Oral Cap, CONCLUSION:   Diffuse bone demineralization. Large suprapatellar joint effusion. Calcified quadriceps enthesopathy. Mild degenerative changes of the knee. Findings are similar to the comparison study of 10/05/2020.   No evidence for acute osseous abnorm -1/2021 echo with normal EF 60-65%, severe aortic stenosis, mild AI, mild MR[GB.1]  -? TAVR  -hold bumex, uses for LE edema   -as per cards    PAF[GB.2]  -continue cardizem  -hold eliquis[GB.1]   -?  If recurrent hemarthrosis, then consider anti-arrhythmic, Patient states she went for an outpatient echo and Lexiscan nuclear stress test she was doing well. She uses a walking stick at home and a rolling walker while she is outside for safety purposes.   She then states she returned home from test and took a nap • Unspecified essential hypertension         PSH  Past Surgical History:   Procedure Laterality Date   • APPENDECTOMY     • APPENDECTOMY     • COLONOSCOPY     • HYSTERECTOMY      JOY - Dr. Sallie Armstrong   • LUMPECTOMY LEFT     • LUMPECTOMY RIGHT     • GIGI LOCAL • Cancer Other         breast   • Breast Cancer Other         m  aunt unsure of age   • Cancer Sister         breast   • Breast Cancer Sister 76       Review of Systems  A comprehensive 10 point review of systems was completed.   Pertinent positives and neg Admit Date:  1/28/21    ASSESSMENT / PLAN:   Ms. Real Turner is a 81 yo F with PMH of Afib on Eliquis, LE edema, CKD stage 3, HTN,[GB.1] OA shoulder, severe[GB. 2] aortic stenosis and hemiarthrosis of right knee who presents with right knee swelling and pain[GB. 1 Orthopaedic Surgery Consult - Initial    Consulting Service: [facility] NP  Reason for consult: left knee swelling  CC: left knee swelling and pain  HPI: 79yo f w pmh for mmp including a fib, aortic stenosis admitted yesterday night for left knee pain and swe Procedure Laterality Date   • APPENDECTOMY     • APPENDECTOMY     • COLONOSCOPY     • HYSTERECTOMY      DUB - Dr. Annett Carrel   • LUMPECTOMY LEFT     • LUMPECTOMY RIGHT     • GIGI LOCALIZATION WIRE 1 SITE LEFT (CPT=19281)     • GIGI LOCALIZATION WIRE 1 SITE RIG Weight Concern: Not Asked        Special Diet: Not Asked        Back Care: Not Asked        Exercise: No        Bike Helmet: Not Asked        Seat Belt: Not Asked        Self-Exams: Not Asked    Social History Narrative      Not on file    Social His Given atraumatic etiology and lack of fracture would consider vascular surgery consult if further issues as working dx related to their subspecialty     All questions and concerns were addressed to the patient and  liking.          Rosalia Coello, Patient is a[TL.3 80year old[TL.2] female admitted 1/27/2021 for hemarthrosis of R knee. Per ortho consult: \"no concerns for septic joint. No concerns for fracture. Atraumatic injury.  Recommend rest, compression w ace wrap, elevation above heart level, Patient will benefit from continued IP PT services to address these deficits in preparation for discharge.     DISCHARGE RECOMMENDATIONS[TL.1]  PT Discharge Recommendations: Sub-acute rehabilitation;24 hour care/supervision[TL.2]    PLAN[TL.1]  PT Treatment SUBJECTIVE  \"I'm sorry, I'm nervous I'm going to fall. The bed is so high! \"    PHYSICAL THERAPY EXAMINATION     OBJECTIVE[TL.1]  Precautions: Limb alert - right  Fall Risk: High fall risk[TL.2]    WEIGHT BEARING RESTRICTION[TL.1]  Weight Bearing Restri FUNCTIONAL ABILITY STATUS  Gait Assessment[TL.1]   Gait Assistance: Not tested           Stoop/Curb Assistance: Not tested[TL.2]   Exercise/Education Provided:  Bed mobility  Body mechanics  Energy conservation  Functional activity tolerated  Posture  Stre Presenting Problem: Hemarthrosis of R knee[TL. 2]  Reason for Therapy: Mobility Dysfunction and Discharge Planning    PHYSICAL THERAPY ASSESSMENT   During this session the following PPE was worn by this therapist: surgical mask, gloves, goggles    Patient i Patient functioning significantly lower than baseline status. Unsafe to return home based on deficits described above.  The patient's[TL.1] Approx Degree of Impairment: 72.57%[TL.2] has been calculated based on documentation in the [facility] '6 clicks' [employer] Type of Home: House   Home Layout: Two level; Able to live on main level  Stairs to Enter : 3  Railing: Yes     Lives With: Spouse  Drives: No(spouse drives)  Patient Owned Equipment: Rolling walker  Patient Regularly Uses: Glasses[TL. 2]    Prior Level of I How much help from another person does the patient currently need. ..[TL.1]   -   Moving to and from a bed to a chair (including a wheelchair)?: A Lot   -   Need to walk in hospital room?: Total   -   Climbing 3-5 steps with a railing?: Total[TL. 2]     AM-P No notes of this type exist for this encounter.      Immunizations     Name Date      INFLUENZA 09/15/20     INFLUENZA 09/10/19     INFLUENZA 10/01/18     INFLUENZA 10/13/17     INFLUENZA 01/20/17     Pneumococcal (Prevnar 13) 10/13/17     Pneumovax 23 01/0

## (undated) NOTE — IP AVS SNAPSHOT
[facility]            (For Outpatient Use Only) Initial Admit Date: 10/5/2020   Inpt/Obs Admit Date: Inpt: N/A / Obs: 10/05/20   Discharge Date:    Hospital Acct:  [de-identified]   MRN: [de-identified]   CSN: [id number]   CEID: [id number]        EN TERTIARY INSURANCE   Payor:  Plan:    Group Number:  Insurance Type:    Subscriber Name:  Subscriber :    Subscriber ID:  Pt Rel to Subscriber:    Hospital Account Financial Class: Medicare    2020